# Patient Record
Sex: MALE | Race: WHITE | NOT HISPANIC OR LATINO | Employment: FULL TIME | URBAN - METROPOLITAN AREA
[De-identification: names, ages, dates, MRNs, and addresses within clinical notes are randomized per-mention and may not be internally consistent; named-entity substitution may affect disease eponyms.]

---

## 2021-05-13 ENCOUNTER — HOSPITAL ENCOUNTER (OUTPATIENT)
Facility: MEDICAL CENTER | Age: 66
End: 2021-05-13
Attending: ANESTHESIOLOGY
Payer: COMMERCIAL

## 2021-05-13 LAB
SARS-COV+SARS-COV-2 AG RESP QL IA.RAPID: NOTDETECTED
SPECIMEN SOURCE: NORMAL

## 2021-05-13 PROCEDURE — 87426 SARSCOV CORONAVIRUS AG IA: CPT

## 2023-03-13 PROBLEM — E78.1 HYPERTRIGLYCERIDEMIA: Status: ACTIVE | Noted: 2020-12-03

## 2023-03-13 PROBLEM — R73.01 IMPAIRED FASTING GLUCOSE: Status: ACTIVE | Noted: 2020-12-03

## 2023-03-13 PROBLEM — I10 ESSENTIAL HYPERTENSION: Status: ACTIVE | Noted: 2020-12-03

## 2023-03-13 PROBLEM — F32.A DEPRESSIVE DISORDER: Status: ACTIVE | Noted: 2020-12-03

## 2023-03-13 PROBLEM — R79.83 HOMOCYSTINEMIA: Status: ACTIVE | Noted: 2020-12-03

## 2023-03-13 PROBLEM — I82.409 ACUTE EMBOLISM AND THROMBOSIS OF UNSPECIFIED DEEP VEINS OF UNSPECIFIED LOWER EXTREMITY (HCC): Status: ACTIVE | Noted: 2020-12-03

## 2023-03-13 PROBLEM — M10.9 GOUT: Status: ACTIVE | Noted: 2020-12-03

## 2023-10-17 PROBLEM — K86.89 PANCREATIC MASS: Status: ACTIVE | Noted: 2023-10-17

## 2023-10-17 PROBLEM — C78.02 MALIGNANT NEOPLASM METASTATIC TO BOTH LUNGS (HCC): Status: ACTIVE | Noted: 2023-10-17

## 2023-10-17 PROBLEM — C78.01 MALIGNANT NEOPLASM METASTATIC TO BOTH LUNGS (HCC): Status: ACTIVE | Noted: 2023-10-17

## 2023-10-17 PROBLEM — R97.8 ELEVATED CA 19-9 LEVEL: Status: ACTIVE | Noted: 2023-10-17

## 2023-11-11 ENCOUNTER — HOSPITAL ENCOUNTER (INPATIENT)
Facility: MEDICAL CENTER | Age: 68
LOS: 10 days | DRG: 173 | End: 2023-11-21
Attending: EMERGENCY MEDICINE | Admitting: HOSPITALIST
Payer: COMMERCIAL

## 2023-11-11 ENCOUNTER — APPOINTMENT (OUTPATIENT)
Dept: RADIOLOGY | Facility: MEDICAL CENTER | Age: 68
DRG: 173 | End: 2023-11-11
Attending: HOSPITALIST
Payer: COMMERCIAL

## 2023-11-11 ENCOUNTER — APPOINTMENT (OUTPATIENT)
Dept: RADIOLOGY | Facility: MEDICAL CENTER | Age: 68
DRG: 173 | End: 2023-11-11
Attending: EMERGENCY MEDICINE
Payer: COMMERCIAL

## 2023-11-11 DIAGNOSIS — F41.9 ANXIETY: ICD-10-CM

## 2023-11-11 DIAGNOSIS — C25.9 ADENOCARCINOMA OF PANCREAS, STAGE 4 (HCC): ICD-10-CM

## 2023-11-11 DIAGNOSIS — I82.401 ACUTE DEEP VEIN THROMBOSIS (DVT) OF RIGHT LOWER EXTREMITY, UNSPECIFIED VEIN (HCC): ICD-10-CM

## 2023-11-11 DIAGNOSIS — K83.1 BILIARY OBSTRUCTION: ICD-10-CM

## 2023-11-11 DIAGNOSIS — K83.1 OBSTRUCTIVE JAUNDICE: ICD-10-CM

## 2023-11-11 PROBLEM — R74.8 ELEVATED LIVER ENZYMES: Status: ACTIVE | Noted: 2023-11-11

## 2023-11-11 PROBLEM — N17.9 ACUTE RENAL FAILURE (HCC): Status: ACTIVE | Noted: 2023-11-11

## 2023-11-11 PROBLEM — Z71.89 GOALS OF CARE, COUNSELING/DISCUSSION: Status: ACTIVE | Noted: 2023-11-11

## 2023-11-11 PROBLEM — R57.9 SHOCK (HCC): Status: ACTIVE | Noted: 2023-11-11

## 2023-11-11 LAB
ALBUMIN SERPL BCP-MCNC: 2.8 G/DL (ref 3.2–4.9)
ALBUMIN SERPL BCP-MCNC: 2.9 G/DL (ref 3.2–4.9)
ALBUMIN/GLOB SERPL: 0.8 G/DL
ALBUMIN/GLOB SERPL: 0.9 G/DL
ALP SERPL-CCNC: 768 U/L (ref 30–99)
ALP SERPL-CCNC: 788 U/L (ref 30–99)
ALT SERPL-CCNC: 150 U/L (ref 2–50)
ALT SERPL-CCNC: 151 U/L (ref 2–50)
AMMONIA PLAS-SCNC: 23 UMOL/L (ref 11–45)
ANION GAP SERPL CALC-SCNC: 13 MMOL/L (ref 7–16)
ANION GAP SERPL CALC-SCNC: 13 MMOL/L (ref 7–16)
AST SERPL-CCNC: 110 U/L (ref 12–45)
AST SERPL-CCNC: 127 U/L (ref 12–45)
BASOPHILS # BLD AUTO: 0.3 % (ref 0–1.8)
BASOPHILS # BLD AUTO: 0.4 % (ref 0–1.8)
BASOPHILS # BLD: 0.03 K/UL (ref 0–0.12)
BASOPHILS # BLD: 0.04 K/UL (ref 0–0.12)
BILIRUB SERPL-MCNC: 12.1 MG/DL (ref 0.1–1.5)
BILIRUB SERPL-MCNC: 12.2 MG/DL (ref 0.1–1.5)
BUN SERPL-MCNC: 45 MG/DL (ref 8–22)
BUN SERPL-MCNC: 46 MG/DL (ref 8–22)
CALCIUM ALBUM COR SERPL-MCNC: 9.4 MG/DL (ref 8.5–10.5)
CALCIUM ALBUM COR SERPL-MCNC: 9.6 MG/DL (ref 8.5–10.5)
CALCIUM SERPL-MCNC: 8.5 MG/DL (ref 8.5–10.5)
CALCIUM SERPL-MCNC: 8.6 MG/DL (ref 8.5–10.5)
CHLORIDE SERPL-SCNC: 93 MMOL/L (ref 96–112)
CHLORIDE SERPL-SCNC: 94 MMOL/L (ref 96–112)
CO2 SERPL-SCNC: 18 MMOL/L (ref 20–33)
CO2 SERPL-SCNC: 19 MMOL/L (ref 20–33)
CREAT SERPL-MCNC: 1.11 MG/DL (ref 0.5–1.4)
CREAT SERPL-MCNC: 1.63 MG/DL (ref 0.5–1.4)
EKG IMPRESSION: NORMAL
EKG IMPRESSION: NORMAL
EOSINOPHIL # BLD AUTO: 0.06 K/UL (ref 0–0.51)
EOSINOPHIL # BLD AUTO: 0.07 K/UL (ref 0–0.51)
EOSINOPHIL NFR BLD: 0.6 % (ref 0–6.9)
EOSINOPHIL NFR BLD: 0.7 % (ref 0–6.9)
ERYTHROCYTE [DISTWIDTH] IN BLOOD BY AUTOMATED COUNT: 49.5 FL (ref 35.9–50)
ERYTHROCYTE [DISTWIDTH] IN BLOOD BY AUTOMATED COUNT: 51.1 FL (ref 35.9–50)
FLUAV RNA SPEC QL NAA+PROBE: NEGATIVE
FLUBV RNA SPEC QL NAA+PROBE: NEGATIVE
GFR SERPLBLD CREATININE-BSD FMLA CKD-EPI: 46 ML/MIN/1.73 M 2
GFR SERPLBLD CREATININE-BSD FMLA CKD-EPI: 72 ML/MIN/1.73 M 2
GLOBULIN SER CALC-MCNC: 3.3 G/DL (ref 1.9–3.5)
GLOBULIN SER CALC-MCNC: 3.3 G/DL (ref 1.9–3.5)
GLUCOSE SERPL-MCNC: 119 MG/DL (ref 65–99)
GLUCOSE SERPL-MCNC: 122 MG/DL (ref 65–99)
HCT VFR BLD AUTO: 38.4 % (ref 42–52)
HCT VFR BLD AUTO: 40.3 % (ref 42–52)
HGB BLD-MCNC: 13.3 G/DL (ref 14–18)
HGB BLD-MCNC: 13.8 G/DL (ref 14–18)
IMM GRANULOCYTES # BLD AUTO: 0.1 K/UL (ref 0–0.11)
IMM GRANULOCYTES # BLD AUTO: 0.15 K/UL (ref 0–0.11)
IMM GRANULOCYTES NFR BLD AUTO: 1 % (ref 0–0.9)
IMM GRANULOCYTES NFR BLD AUTO: 1.5 % (ref 0–0.9)
LACTATE SERPL-SCNC: 2.1 MMOL/L (ref 0.5–2)
LIPASE SERPL-CCNC: 24 U/L (ref 11–82)
LIPASE SERPL-CCNC: 26 U/L (ref 11–82)
LYMPHOCYTES # BLD AUTO: 1.14 K/UL (ref 1–4.8)
LYMPHOCYTES # BLD AUTO: 1.25 K/UL (ref 1–4.8)
LYMPHOCYTES NFR BLD: 11.3 % (ref 22–41)
LYMPHOCYTES NFR BLD: 12.6 % (ref 22–41)
MAGNESIUM SERPL-MCNC: 2.4 MG/DL (ref 1.5–2.5)
MCH RBC QN AUTO: 32.2 PG (ref 27–33)
MCH RBC QN AUTO: 32.4 PG (ref 27–33)
MCHC RBC AUTO-ENTMCNC: 34.2 G/DL (ref 32.3–36.5)
MCHC RBC AUTO-ENTMCNC: 34.6 G/DL (ref 32.3–36.5)
MCV RBC AUTO: 93.4 FL (ref 81.4–97.8)
MCV RBC AUTO: 94.2 FL (ref 81.4–97.8)
MONOCYTES # BLD AUTO: 0.84 K/UL (ref 0–0.85)
MONOCYTES # BLD AUTO: 0.92 K/UL (ref 0–0.85)
MONOCYTES NFR BLD AUTO: 8.5 % (ref 0–13.4)
MONOCYTES NFR BLD AUTO: 9.1 % (ref 0–13.4)
NEUTROPHILS # BLD AUTO: 7.56 K/UL (ref 1.82–7.42)
NEUTROPHILS # BLD AUTO: 7.83 K/UL (ref 1.82–7.42)
NEUTROPHILS NFR BLD: 76.4 % (ref 44–72)
NEUTROPHILS NFR BLD: 77.6 % (ref 44–72)
NRBC # BLD AUTO: 0 K/UL
NRBC # BLD AUTO: 0 K/UL
NRBC BLD-RTO: 0 /100 WBC (ref 0–0.2)
NRBC BLD-RTO: 0 /100 WBC (ref 0–0.2)
NT-PROBNP SERPL IA-MCNC: 1358 PG/ML (ref 0–125)
PHOSPHATE SERPL-MCNC: 4.2 MG/DL (ref 2.5–4.5)
PLATELET # BLD AUTO: 126 K/UL (ref 164–446)
PLATELET # BLD AUTO: 129 K/UL (ref 164–446)
PMV BLD AUTO: 9.6 FL (ref 9–12.9)
PMV BLD AUTO: 9.7 FL (ref 9–12.9)
POTASSIUM SERPL-SCNC: 4.9 MMOL/L (ref 3.6–5.5)
POTASSIUM SERPL-SCNC: 5.4 MMOL/L (ref 3.6–5.5)
PROT SERPL-MCNC: 6.1 G/DL (ref 6–8.2)
PROT SERPL-MCNC: 6.2 G/DL (ref 6–8.2)
RBC # BLD AUTO: 4.11 M/UL (ref 4.7–6.1)
RBC # BLD AUTO: 4.28 M/UL (ref 4.7–6.1)
RSV RNA SPEC QL NAA+PROBE: NEGATIVE
SARS-COV-2 RNA RESP QL NAA+PROBE: NOTDETECTED
SODIUM SERPL-SCNC: 124 MMOL/L (ref 135–145)
SODIUM SERPL-SCNC: 126 MMOL/L (ref 135–145)
TROPONIN T SERPL-MCNC: 87 NG/L (ref 6–19)
WBC # BLD AUTO: 10.1 K/UL (ref 4.8–10.8)
WBC # BLD AUTO: 9.9 K/UL (ref 4.8–10.8)

## 2023-11-11 PROCEDURE — 83735 ASSAY OF MAGNESIUM: CPT

## 2023-11-11 PROCEDURE — 82140 ASSAY OF AMMONIA: CPT

## 2023-11-11 PROCEDURE — 83605 ASSAY OF LACTIC ACID: CPT

## 2023-11-11 PROCEDURE — 99223 1ST HOSP IP/OBS HIGH 75: CPT | Mod: AI | Performed by: HOSPITALIST

## 2023-11-11 PROCEDURE — 770022 HCHG ROOM/CARE - ICU (200)

## 2023-11-11 PROCEDURE — 99285 EMERGENCY DEPT VISIT HI MDM: CPT

## 2023-11-11 PROCEDURE — 99291 CRITICAL CARE FIRST HOUR: CPT | Performed by: INTERNAL MEDICINE

## 2023-11-11 PROCEDURE — 85025 COMPLETE CBC W/AUTO DIFF WBC: CPT

## 2023-11-11 PROCEDURE — 0241U HCHG SARS-COV-2 COVID-19 NFCT DS RESP RNA 4 TRGT ED POC: CPT

## 2023-11-11 PROCEDURE — 83880 ASSAY OF NATRIURETIC PEPTIDE: CPT

## 2023-11-11 PROCEDURE — 80053 COMPREHEN METABOLIC PANEL: CPT

## 2023-11-11 PROCEDURE — 93005 ELECTROCARDIOGRAM TRACING: CPT | Performed by: EMERGENCY MEDICINE

## 2023-11-11 PROCEDURE — 700105 HCHG RX REV CODE 258: Performed by: INTERNAL MEDICINE

## 2023-11-11 PROCEDURE — 84484 ASSAY OF TROPONIN QUANT: CPT

## 2023-11-11 PROCEDURE — 700102 HCHG RX REV CODE 250 W/ 637 OVERRIDE(OP): Performed by: HOSPITALIST

## 2023-11-11 PROCEDURE — 83690 ASSAY OF LIPASE: CPT

## 2023-11-11 PROCEDURE — 71045 X-RAY EXAM CHEST 1 VIEW: CPT

## 2023-11-11 PROCEDURE — 84100 ASSAY OF PHOSPHORUS: CPT

## 2023-11-11 PROCEDURE — 700105 HCHG RX REV CODE 258: Performed by: HOSPITALIST

## 2023-11-11 PROCEDURE — 700111 HCHG RX REV CODE 636 W/ 250 OVERRIDE (IP): Mod: JZ | Performed by: INTERNAL MEDICINE

## 2023-11-11 PROCEDURE — 700102 HCHG RX REV CODE 250 W/ 637 OVERRIDE(OP): Performed by: EMERGENCY MEDICINE

## 2023-11-11 PROCEDURE — 700101 HCHG RX REV CODE 250: Performed by: INTERNAL MEDICINE

## 2023-11-11 PROCEDURE — A9270 NON-COVERED ITEM OR SERVICE: HCPCS | Performed by: HOSPITALIST

## 2023-11-11 PROCEDURE — 93005 ELECTROCARDIOGRAM TRACING: CPT | Performed by: HOSPITALIST

## 2023-11-11 PROCEDURE — 700111 HCHG RX REV CODE 636 W/ 250 OVERRIDE (IP): Mod: JZ | Performed by: HOSPITALIST

## 2023-11-11 PROCEDURE — C9803 HOPD COVID-19 SPEC COLLECT: HCPCS

## 2023-11-11 PROCEDURE — A9270 NON-COVERED ITEM OR SERVICE: HCPCS | Performed by: EMERGENCY MEDICINE

## 2023-11-11 PROCEDURE — 36415 COLL VENOUS BLD VENIPUNCTURE: CPT

## 2023-11-11 RX ORDER — ACETAMINOPHEN 325 MG/1
650 TABLET ORAL EVERY 6 HOURS PRN
Status: DISCONTINUED | OUTPATIENT
Start: 2023-11-11 | End: 2023-11-11

## 2023-11-11 RX ORDER — SODIUM CHLORIDE 9 MG/ML
INJECTION, SOLUTION INTRAVENOUS CONTINUOUS
Status: DISCONTINUED | OUTPATIENT
Start: 2023-11-11 | End: 2023-11-11

## 2023-11-11 RX ORDER — SODIUM CHLORIDE, SODIUM LACTATE, POTASSIUM CHLORIDE, AND CALCIUM CHLORIDE .6; .31; .03; .02 G/100ML; G/100ML; G/100ML; G/100ML
1000 INJECTION, SOLUTION INTRAVENOUS ONCE
Status: COMPLETED | OUTPATIENT
Start: 2023-11-12 | End: 2023-11-12

## 2023-11-11 RX ORDER — NOREPINEPHRINE BITARTRATE 0.03 MG/ML
0-1 INJECTION, SOLUTION INTRAVENOUS CONTINUOUS
Status: DISCONTINUED | OUTPATIENT
Start: 2023-11-11 | End: 2023-11-16

## 2023-11-11 RX ORDER — OXYCODONE HYDROCHLORIDE 5 MG/1
5 TABLET ORAL
Status: DISCONTINUED | OUTPATIENT
Start: 2023-11-11 | End: 2023-11-21 | Stop reason: HOSPADM

## 2023-11-11 RX ORDER — BUPROPION HYDROCHLORIDE 150 MG/1
150 TABLET, EXTENDED RELEASE ORAL EVERY MORNING
Status: DISCONTINUED | OUTPATIENT
Start: 2023-11-12 | End: 2023-11-21 | Stop reason: HOSPADM

## 2023-11-11 RX ORDER — SODIUM CHLORIDE, SODIUM LACTATE, POTASSIUM CHLORIDE, AND CALCIUM CHLORIDE .6; .31; .03; .02 G/100ML; G/100ML; G/100ML; G/100ML
30 INJECTION, SOLUTION INTRAVENOUS ONCE
Status: COMPLETED | OUTPATIENT
Start: 2023-11-11 | End: 2023-11-11

## 2023-11-11 RX ORDER — ONDANSETRON 4 MG/1
4 TABLET, ORALLY DISINTEGRATING ORAL EVERY 4 HOURS PRN
Status: DISCONTINUED | OUTPATIENT
Start: 2023-11-11 | End: 2023-11-11

## 2023-11-11 RX ORDER — OXYCODONE HYDROCHLORIDE 10 MG/1
10 TABLET ORAL
Status: DISCONTINUED | OUTPATIENT
Start: 2023-11-11 | End: 2023-11-21 | Stop reason: HOSPADM

## 2023-11-11 RX ORDER — OXYCODONE HCL 10 MG/1
10 TABLET, FILM COATED, EXTENDED RELEASE ORAL EVERY 12 HOURS
Status: ON HOLD | COMMUNITY
End: 2023-11-20

## 2023-11-11 RX ORDER — LABETALOL HYDROCHLORIDE 5 MG/ML
10 INJECTION, SOLUTION INTRAVENOUS EVERY 4 HOURS PRN
Status: DISCONTINUED | OUTPATIENT
Start: 2023-11-11 | End: 2023-11-21 | Stop reason: HOSPADM

## 2023-11-11 RX ORDER — LISINOPRIL 20 MG/1
20 TABLET ORAL DAILY
Status: DISCONTINUED | OUTPATIENT
Start: 2023-11-12 | End: 2023-11-11

## 2023-11-11 RX ORDER — OMEPRAZOLE 20 MG/1
20 CAPSULE, DELAYED RELEASE ORAL DAILY
Status: DISCONTINUED | OUTPATIENT
Start: 2023-11-12 | End: 2023-11-21 | Stop reason: HOSPADM

## 2023-11-11 RX ORDER — FLUOXETINE 10 MG/1
10 CAPSULE ORAL DAILY
Status: DISCONTINUED | OUTPATIENT
Start: 2023-11-12 | End: 2023-11-11

## 2023-11-11 RX ORDER — ONDANSETRON 4 MG/1
4 TABLET, ORALLY DISINTEGRATING ORAL EVERY 4 HOURS PRN
Status: DISCONTINUED | OUTPATIENT
Start: 2023-11-11 | End: 2023-11-21 | Stop reason: HOSPADM

## 2023-11-11 RX ORDER — HYDROXYZINE HYDROCHLORIDE 25 MG/1
25 TABLET, FILM COATED ORAL 3 TIMES DAILY PRN
Status: DISCONTINUED | OUTPATIENT
Start: 2023-11-11 | End: 2023-11-20

## 2023-11-11 RX ORDER — MORPHINE SULFATE 4 MG/ML
4 INJECTION INTRAVENOUS
Status: DISCONTINUED | OUTPATIENT
Start: 2023-11-11 | End: 2023-11-11

## 2023-11-11 RX ORDER — OXYCODONE HYDROCHLORIDE 10 MG/1
10 TABLET ORAL ONCE
Status: COMPLETED | OUTPATIENT
Start: 2023-11-11 | End: 2023-11-11

## 2023-11-11 RX ORDER — ONDANSETRON 2 MG/ML
4 INJECTION INTRAMUSCULAR; INTRAVENOUS EVERY 4 HOURS PRN
Status: DISCONTINUED | OUTPATIENT
Start: 2023-11-11 | End: 2023-11-21 | Stop reason: HOSPADM

## 2023-11-11 RX ORDER — HYDROXYZINE HYDROCHLORIDE 25 MG/1
25 TABLET, FILM COATED ORAL ONCE
Status: DISPENSED | OUTPATIENT
Start: 2023-11-11 | End: 2023-11-12

## 2023-11-11 RX ORDER — SODIUM CHLORIDE, SODIUM LACTATE, POTASSIUM CHLORIDE, CALCIUM CHLORIDE 600; 310; 30; 20 MG/100ML; MG/100ML; MG/100ML; MG/100ML
INJECTION, SOLUTION INTRAVENOUS CONTINUOUS
Status: DISCONTINUED | OUTPATIENT
Start: 2023-11-11 | End: 2023-11-13

## 2023-11-11 RX ADMIN — SODIUM CHLORIDE, POTASSIUM CHLORIDE, SODIUM LACTATE AND CALCIUM CHLORIDE 2967 ML: 600; 310; 30; 20 INJECTION, SOLUTION INTRAVENOUS at 20:45

## 2023-11-11 RX ADMIN — OXYCODONE HYDROCHLORIDE 10 MG: 10 TABLET ORAL at 17:39

## 2023-11-11 RX ADMIN — SODIUM CHLORIDE, POTASSIUM CHLORIDE, SODIUM LACTATE AND CALCIUM CHLORIDE: 600; 310; 30; 20 INJECTION, SOLUTION INTRAVENOUS at 20:10

## 2023-11-11 RX ADMIN — HYDROXYZINE HYDROCHLORIDE 25 MG: 25 TABLET, FILM COATED ORAL at 20:26

## 2023-11-11 RX ADMIN — OXYCODONE HYDROCHLORIDE 10 MG: 10 TABLET ORAL at 21:37

## 2023-11-11 RX ADMIN — SODIUM CHLORIDE, POTASSIUM CHLORIDE, SODIUM LACTATE AND CALCIUM CHLORIDE 1000 ML: 600; 310; 30; 20 INJECTION, SOLUTION INTRAVENOUS at 23:59

## 2023-11-11 RX ADMIN — PIPERACILLIN AND TAZOBACTAM 4.5 G: 4; .5 INJECTION, POWDER, FOR SOLUTION INTRAVENOUS at 21:41

## 2023-11-11 RX ADMIN — NOREPINEPHRINE BITARTRATE 0.1 MCG/KG/MIN: 1 INJECTION, SOLUTION, CONCENTRATE INTRAVENOUS at 23:30

## 2023-11-11 RX ADMIN — FENTANYL CITRATE 50 MCG: 50 INJECTION, SOLUTION INTRAMUSCULAR; INTRAVENOUS at 23:21

## 2023-11-11 ASSESSMENT — ENCOUNTER SYMPTOMS
DIAPHORESIS: 0
FLANK PAIN: 0
PHOTOPHOBIA: 0
FEVER: 0
CHILLS: 0
SINUS PAIN: 0
HEMOPTYSIS: 0
BLOOD IN STOOL: 0
SENSORY CHANGE: 0
VOMITING: 0
TINGLING: 0
NAUSEA: 0
DIZZINESS: 0
MYALGIAS: 0
COUGH: 0
CLAUDICATION: 0
DEPRESSION: 0
FALLS: 0
HEARTBURN: 0
SHORTNESS OF BREATH: 1
BACK PAIN: 0
PND: 0
HEADACHES: 0
SORE THROAT: 0
SPUTUM PRODUCTION: 0
NAUSEA: 1
NECK PAIN: 0
DOUBLE VISION: 0
CONSTIPATION: 0
WHEEZING: 0
WEAKNESS: 1
ORTHOPNEA: 0
BRUISES/BLEEDS EASILY: 0
DIZZINESS: 1
PALPITATIONS: 0
BLURRED VISION: 0
EYE PAIN: 0
DIARRHEA: 0
HALLUCINATIONS: 0
FOCAL WEAKNESS: 0
POLYDIPSIA: 0
STRIDOR: 0
ABDOMINAL PAIN: 1
TREMORS: 0

## 2023-11-11 ASSESSMENT — FIBROSIS 4 INDEX
FIB4 SCORE: 4.73
FIB4 SCORE: 2.5

## 2023-11-11 ASSESSMENT — LIFESTYLE VARIABLES: SUBSTANCE_ABUSE: 0

## 2023-11-11 ASSESSMENT — PAIN DESCRIPTION - PAIN TYPE
TYPE: ACUTE PAIN

## 2023-11-11 NOTE — ED NOTES
Patient to the restroom with a steady gait, oxygen saturation >93% on RA. Patient reported increased weakness and was wheeled back to red 11. ERP notified.

## 2023-11-11 NOTE — ED TRIAGE NOTES
Chief Complaint   Patient presents with    Weakness     BIB EMS from home for generalized weakness x3 months, increasing in severity today. States that ADLs like brushing his teeth make him weak. Pt reports recent diagnosis of Stage 4 pancreatic cancer.     Shortness of Breath     SOB x4 days. Pt denies home O2 use, 94% on RA.       Pt also reports decreased appetite since August. On EMS arrival patient was hypotensive at 88/45, administered 1L NS.

## 2023-11-11 NOTE — ED PROVIDER NOTES
ED Provider Note    CHIEF COMPLAINT  Chief Complaint   Patient presents with    Weakness     BIB EMS from home for generalized weakness x3 months, increasing in severity today. States that ADLs like brushing his teeth make him weak. Pt reports recent diagnosis of Stage 4 pancreatic cancer.     Shortness of Breath     SOB x4 days. Pt denies home O2 use, 94% on RA.        EXTERNAL RECORDS REVIEWED  External records show that the patient is a recent diagnosis of stage IV pancreatic cancer, and had malignant ascites recently documented, consistent with lung metastases.  Patient was in communication with oncology, Dr. Hatch, yesterday.      HPI/ROS  LIMITATION TO HISTORY       OUTSIDE HISTORIAN(S):  Family at bedside contributes to history.    Adam Yan is a 68 y.o. male who presents to the emergency department due to progressive generalized weakness.  He has been deteriorating for about 3 months, per the patient and family, but reportedly has gotten to the point where he needs physical assistance to do any ADLs.  Tasks as simple as brushing his teeth make him feel weak and debilitated.  As of the last few days, he has a new diagnosis of stage IV pancreatic cancer, as above.  He has no history of home oxygen use.  His wife says that at night especially, he has had apparent shortness of breath, with coughing, and she thinks he might need oxygen.  He has 94% oxygen saturation at the bedside.  He is obviously jaundiced.  He is in touch with oncology and with GI, and there is a consideration for biliary stenting within the next couple of weeks, though no stent was placed after a recent ERCP.  He has not started any chemotherapy or radiation.  He does not have a clear prognosis, but family clearly understands that his prognosis is poor based on recent test results.  Patient and family indicate that their preference is to be at home, and they seem very open to discussions about palliative care/hospice, but some  "laboratory testing and other evaluation here may help with decision-making, for the patient and family, and for his GI and oncology.    PAST MEDICAL HISTORY   has a past medical history of Hypertension.    SURGICAL HISTORY   has a past surgical history that includes knee arthroscopy (Right).    FAMILY HISTORY  History reviewed. No pertinent family history.    SOCIAL HISTORY  Social History     Tobacco Use    Smoking status: Never    Smokeless tobacco: Never   Vaping Use    Vaping Use: Never used   Substance and Sexual Activity    Alcohol use: Never    Drug use: No    Sexual activity: Not on file       CURRENT MEDICATIONS  Home Medications       Reviewed by Braulio Diaz (Pharmacy Tech) on 11/11/23 at 1914  Med List Status: Complete     Medication Last Dose Status   buPROPion (WELLBUTRIN XL) 150 MG XL tablet 11/11/2023 Active   hydrOXYzine HCl (ATARAX) 25 MG Tab 11/9/2023 Active   lisinopril (PRINIVIL) 20 MG Tab 11/11/2023 Active   omeprazole (PRILOSEC) 20 MG delayed-release capsule 11/11/2023 Active   ondansetron (ZOFRAN ODT) 4 MG TABLET DISPERSIBLE 11/7/2023 Active   oxyCODONE CR (OXYCONTIN) 10 MG Tablet Extended Release 12 hour Abuse-Deterrent 11/11/2023 Active   oxyCODONE immediate-release (ROXICODONE) 5 MG Tab 11/8/2023 Active                    ALLERGIES  No Known Allergies      PHYSICAL EXAM  VITAL SIGNS: BP 99/64   Pulse 80   Temp 36.7 °C (98.1 °F) (Temporal)   Resp (!) 29   Ht 1.854 m (6' 1\")   Wt 95.7 kg (210 lb 15.7 oz)   SpO2 94%   BMI 27.84 kg/m²   Pulse ox interpretation: I interpret this pulse ox as normal.  Constitutional: Alert in no apparent distress.  HENT: No signs of trauma, Bilateral external ears normal, Nose normal.   Eyes: Conjunctiva icteric.   Neck: Normal range of motion, Supple, No stridor.   Lymphatic: No lymphadenopathy noted.   Cardiovascular: Regular rate and rhythm, no murmurs.   Thorax & Lungs: Normal breath sounds, No respiratory distress, No wheezing  Abdomen: Bowel " sounds normal, Soft, No tenderness, No masses, No pulsatile masses. No peritoneal signs.  Skin: Jaundice extends from head and neck down to lower abdomen.  Back: No midline bony tenderness.   Extremities: Intact distal pulses, No edema, No cyanosis.  Musculoskeletal: Good range of motion in all major joints. No or major deformities noted.   Neurologic: Alert , Normal motor function, Normal sensory function, No focal deficits noted.   Psychiatric: Affect normal, Judgment normal, Mood normal.         DIAGNOSTIC STUDIES / PROCEDURES  EKG  I have independently interpreted this EKG  Results for orders placed or performed during the hospital encounter of 23   EKG   Result Value Ref Range    Report       [facility]    Test Date:  2023  Pt Name:    LAUREN LUGO               Department: ER  MRN:        [id number]                      Room:       R314  Gender:     Male                         Technician: [id number]  :        1955                   Requested By:ER TRIAGE PROTOCOL  Order #:    [id number]                    Reading MD:    Measurements  Intervals                                Axis  Rate:       92                           P:          23  ME:         179                          QRS:        -31  QRSD:       119                          T:          -18  QT:         354  QTc:        438    Interpretive Statements  Sinus rhythm  Incomplete right bundle branch block  Low voltage, precordial leads  Compared to ECG 2023 14:08:27  Incomplete right bundle-branch block now present  ST (T wave) deviation no longer present     EKG   Result Value Ref Range    Report       [facility] Emergency Dept.    Test Date:  2023  Pt Name:    LAUREN LUGO               Department: ER  MRN:        [id number]                      Room:       R314  Gender:     Male                         Technician: [id number]  :        1955                   Requested By:ALFONSO SOOD  Order #:     [id number]                    Reading MD:    Measurements  Intervals                                Axis  Rate:       78                           P:          41  IN:         170                          QRS:        33  QRSD:       113                          T:          -11  QT:         416  QTc:        474    Interpretive Statements  Sinus rhythm  Borderline intraventricular conduction delay  Low voltage, extremity and precordial leads  Minimal ST depression, lateral leads  No previous ECG available for comparison     EKG   Result Value Ref Range    Report       [facility]    Test Date:  2023  Pt Name:    LAUREN LUGO               Department: ER  MRN:        [id number]                      Room:       T621  Gender:     Male                         Technician: [id number]  :        1955                   Requested By:ALMITA FARIAS  Order #:    [id number]                    Reading MD: Tray Chapman MD    Measurements  Intervals                                Axis  Rate:       92                           P:          23  IN:         179                          QRS:        -31  QRSD:       119                          T:          -18  QT:         354  QTc:        438    Interpretive Statements  Sinus rhythm  Incomplete right bundle branch block  Low voltage, precordial leads  Compared to ECG 2023 14:08:27  NO SIGNIFICANT CHANGES  Electronically Signed On 2023 02:46:03 PST by Tray Chapman MD           LABS  Labs Reviewed   CBC WITH DIFFERENTIAL - Abnormal; Notable for the following components:       Result Value    RBC 4.11 (*)     Hemoglobin 13.3 (*)     Hematocrit 38.4 (*)     Platelet Count 126 (*)     Neutrophils-Polys 77.60 (*)     Lymphocytes 11.30 (*)     Immature Granulocytes 1.00 (*)     Neutrophils (Absolute) 7.83 (*)     Monos (Absolute) 0.92 (*)     All other components within normal limits   COMP METABOLIC PANEL - Abnormal; Notable for the following components:     Sodium 126 (*)     Chloride 94 (*)     Co2 19 (*)     Glucose 119 (*)     Bun 45 (*)     Creatinine 1.63 (*)     AST(SGOT) 110 (*)     ALT(SGPT) 150 (*)     Alkaline Phosphatase 788 (*)     Total Bilirubin 12.2 (*)     Albumin 2.8 (*)     All other components within normal limits   COMP METABOLIC PANEL - Abnormal; Notable for the following components:    Sodium 124 (*)     Chloride 93 (*)     Co2 18 (*)     Glucose 122 (*)     Bun 46 (*)     AST(SGOT) 127 (*)     ALT(SGPT) 151 (*)     Alkaline Phosphatase 768 (*)     Total Bilirubin 12.1 (*)     Albumin 2.9 (*)     All other components within normal limits   ESTIMATED GFR - Abnormal; Notable for the following components:    GFR (CKD-EPI) 46 (*)     All other components within normal limits   PROBRAIN NATRIURETIC PEPTIDE, NT - Abnormal; Notable for the following components:    NT-proBNP 1358 (*)     All other components within normal limits   URINALYSIS - Abnormal; Notable for the following components:    Character Cloudy (*)     Bilirubin Large (*)     Nitrite Positive (*)     Leukocyte Esterase Small (*)     All other components within normal limits    Narrative:     Collected By: [id number] MAX STEIN  If not done within the last 24 hours  Release to patient->Immediate   LACTIC ACID - Abnormal; Notable for the following components:    Lactic Acid 2.1 (*)     All other components within normal limits   TROPONIN - Abnormal; Notable for the following components:    Troponin T 87 (*)     All other components within normal limits   CBC WITH DIFFERENTIAL - Abnormal; Notable for the following components:    RBC 4.28 (*)     Hemoglobin 13.8 (*)     Hematocrit 40.3 (*)     RDW 51.1 (*)     Platelet Count 129 (*)     Neutrophils-Polys 76.40 (*)     Lymphocytes 12.60 (*)     Immature Granulocytes 1.50 (*)     Neutrophils (Absolute) 7.56 (*)     Immature Granulocytes (abs) 0.15 (*)     All other components within normal limits   LACTIC ACID - Abnormal; Notable for the  "following components:    Lactic Acid 2.1 (*)     All other components within normal limits   CBC WITH DIFFERENTIAL - Abnormal; Notable for the following components:    RBC 4.03 (*)     Hemoglobin 12.9 (*)     Hematocrit 37.9 (*)     RDW 50.8 (*)     Platelet Count 130 (*)     Neutrophils-Polys 75.30 (*)     Lymphocytes 12.40 (*)     Immature Granulocytes 1.50 (*)     Neutrophils (Absolute) 7.98 (*)     Monos (Absolute) 1.00 (*)     Immature Granulocytes (abs) 0.16 (*)     All other components within normal limits   COMP METABOLIC PANEL - Abnormal; Notable for the following components:    Sodium 127 (*)     Co2 17 (*)     Glucose 144 (*)     Bun 47 (*)     Creatinine 1.49 (*)     Calcium 7.9 (*)     AST(SGOT) 90 (*)     ALT(SGPT) 130 (*)     Alkaline Phosphatase 709 (*)     Total Bilirubin 10.8 (*)     Albumin 2.7 (*)     Total Protein 5.2 (*)     All other components within normal limits   URINE MICROSCOPIC (W/UA) - Abnormal; Notable for the following components:    WBC 10-20 (*)     RBC 10-20 (*)     Hyaline Cast 3-5 (*)     All other components within normal limits    Narrative:     Collected By: [id number] MAX STEIN  If not done within the last 24 hours  Release to patient->Immediate   ESTIMATED GFR - Abnormal; Notable for the following components:    GFR (CKD-EPI) 51 (*)     All other components within normal limits   AMMONIA   LIPASE   LIPASE   ESTIMATED GFR   MAGNESIUM   PHOSPHORUS   LACTIC ACID   BLOOD CULTURE   BLOOD CULTURE   RETICULOCYTES COUNT   LIPID PROFILE   CORTISOL   TSH WITH REFLEX TO FT4   URINALYSIS   BLOOD CULTURE    Narrative:     Per Hospital Policy: Only change Specimen Src: to \"Line\" if  specified by physician order.  Release to patient->Immediate   BLOOD CULTURE    Narrative:     Per Hospital Policy: Only change Specimen Src: to \"Line\" if  specified by physician order.  Release to patient->Immediate   LACTIC ACID   POC COV-2, FLU A/B, RSV BY PCR         RADIOLOGY  I have independently " interpreted the diagnostic imaging associated with this visit and am waiting the final reading from the radiologist.   My preliminary interpretation is as follows: many nodules.    Radiologist interpretation:   CT-ABDOMEN-PELVIS WITH   Final Result         1. 3 cm ill-defined pancreatic mass, which could represent pancreatic adenocarcinoma or cholangiocarcinoma. There is encasement of the adjacent portal, splenic, and mesenteric veins. There is common bile duct dilation above the level of the lesion.   2. There are several peritoneal nodules, largest measuring 4.1 cm. There is also ascites. Therefore, consistent with peritoneal carcinomatosis.   3. Innumerable lower lung zone nodules, which could represent metastatic disease or a benign process.   4. Liver appears cirrhotic.   5. Density within the gallbladder, as above.   6. Simple small right renal cyst, which does not require follow-up.      DX-CHEST-PORTABLE (1 VIEW)   Final Result      Hypoinflation with prominent interstitial and numerous small ill-defined pulmonary nodules bilaterally may indicate granulomatous disease, pneumonitis or neoplastic process.      EC-ECHOCARDIOGRAM COMPLETE W/O CONT    (Results Pending)         COURSE & MEDICAL DECISION MAKING    ED Observation Status? Yes; I am placing the patient in to an observation status due to a diagnostic uncertainty as well as therapeutic intensity. Patient placed in observation status at 2:20 PM, 11/11/2023.     Observation plan is as follows: Labs, chest x-ray, ambulatory pulse ox, consultation with social work and possibly palliative care, reevaluation.    Upon Reevaluation, the patient's condition has: not improved; and will be escalated to hospitalization.    Patient discharged from ED Observation status at 6:55 PM (Time) 11/11/2023 (Date).     INITIAL ASSESSMENT, COURSE AND PLAN  Care Narrative:     2:20 PM patient evaluated at bedside.  He is severely debilitated, with a new diagnosis of stage IV  pancreatic cancer, with an obviously poor prognosis, having significant difficulty with even simple activities of daily living at this point.  Family is open to hospice and palliative care discussions, but have not yet had through opportunity to discuss treatment options with oncology.  Have ordered screening laboratory tests.    2:38 PM I discussed this patient with Alexia Almanza, , to explain to the patient and family are open to hospice and palliative care discussions and that some lab results are pending.  She recommended that I reach out to palliative care team, who may be able to discuss options with the patient at the bedside.  I have sent a Voalte message to to members of the Perative care team who appear to be available on Voalte.    2:40 PM this patient was able to walk with minimal support to the bathroom, about 25 feet from his room, and although his oxygen did not drop, he was too weak to walk back, and required a wheelchair.    5:36 PM Dr. Cabral, gastroenterology, returned my page regarding the patient's jaundice, severe weakness, and 12 fold increase in his bilirubin in the last couple of weeks.  He recommends admission for strong consideration of biliary stenting and his team will consult.    5:36 PM Oxy 10 ordered for gradual onset pain.     5:56 PM Patient agrees to admit for care coordination and probable stent with GI. Bed request submitted.    6:55 PM Dr. MALOU Matias hospitalist, returned my page to discuss the patient, his recent diagnosis, his treatment so far in the emergency department, his ambulatory failure, and my conversation with gastroenterology.  Dr. Matias agrees to admit.    HYDRATION: Based on the patient's presentation of Other generalized weakness and poor p.o. intake the patient was given IV fluids. IV Hydration was used because oral hydration failed due to inadequate appetite. Upon recheck following hydration, the patient was somewhat improved, with subjective  increase in energy.      ADDITIONAL PROBLEM LIST  New diagnosis of stage IV pancreatic cancer, in addition to the patient's chief complaint of shortness of breath and generalized weakness.    DISPOSITION AND DISCUSSIONS  I have discussed management of the patient with the following physicians and ALISE's: Gastroenterology, and the admitting hospitalist, as above.    FINAL DIAGNOSIS  1. Obstructive jaundice    2. Adenocarcinoma of pancreas, stage 4 (HCC)           Electronically signed by: Rudolph Brown M.D., 11/11/2023 2:10 PM

## 2023-11-12 ENCOUNTER — APPOINTMENT (OUTPATIENT)
Dept: RADIOLOGY | Facility: MEDICAL CENTER | Age: 68
DRG: 173 | End: 2023-11-12
Attending: HOSPITALIST
Payer: COMMERCIAL

## 2023-11-12 ENCOUNTER — APPOINTMENT (OUTPATIENT)
Dept: CARDIOLOGY | Facility: MEDICAL CENTER | Age: 68
DRG: 173 | End: 2023-11-12
Attending: HOSPITALIST
Payer: COMMERCIAL

## 2023-11-12 LAB
ALBUMIN SERPL BCP-MCNC: 2.7 G/DL (ref 3.2–4.9)
ALBUMIN/GLOB SERPL: 1.1 G/DL
ALP SERPL-CCNC: 709 U/L (ref 30–99)
ALT SERPL-CCNC: 130 U/L (ref 2–50)
ANION GAP SERPL CALC-SCNC: 14 MMOL/L (ref 7–16)
APPEARANCE UR: ABNORMAL
AST SERPL-CCNC: 90 U/L (ref 12–45)
BACTERIA #/AREA URNS HPF: NEGATIVE /HPF
BASOPHILS # BLD AUTO: 0.7 % (ref 0–1.8)
BASOPHILS # BLD: 0.07 K/UL (ref 0–0.12)
BILIRUB SERPL-MCNC: 10.8 MG/DL (ref 0.1–1.5)
BILIRUB UR QL STRIP.AUTO: ABNORMAL
BUN SERPL-MCNC: 47 MG/DL (ref 8–22)
CALCIUM ALBUM COR SERPL-MCNC: 8.9 MG/DL (ref 8.5–10.5)
CALCIUM SERPL-MCNC: 7.9 MG/DL (ref 8.5–10.5)
CHLORIDE SERPL-SCNC: 96 MMOL/L (ref 96–112)
CO2 SERPL-SCNC: 17 MMOL/L (ref 20–33)
COLOR UR: ABNORMAL
CREAT SERPL-MCNC: 1.49 MG/DL (ref 0.5–1.4)
EKG IMPRESSION: NORMAL
EOSINOPHIL # BLD AUTO: 0.06 K/UL (ref 0–0.51)
EOSINOPHIL NFR BLD: 0.6 % (ref 0–6.9)
EPI CELLS #/AREA URNS HPF: NEGATIVE /HPF
ERYTHROCYTE [DISTWIDTH] IN BLOOD BY AUTOMATED COUNT: 50.8 FL (ref 35.9–50)
GFR SERPLBLD CREATININE-BSD FMLA CKD-EPI: 51 ML/MIN/1.73 M 2
GLOBULIN SER CALC-MCNC: 2.5 G/DL (ref 1.9–3.5)
GLUCOSE SERPL-MCNC: 144 MG/DL (ref 65–99)
GLUCOSE UR STRIP.AUTO-MCNC: NEGATIVE MG/DL
HCT VFR BLD AUTO: 37.9 % (ref 42–52)
HGB BLD-MCNC: 12.9 G/DL (ref 14–18)
HYALINE CASTS #/AREA URNS LPF: ABNORMAL /LPF
IMM GRANULOCYTES # BLD AUTO: 0.16 K/UL (ref 0–0.11)
IMM GRANULOCYTES NFR BLD AUTO: 1.5 % (ref 0–0.9)
KETONES UR STRIP.AUTO-MCNC: NEGATIVE MG/DL
LACTATE SERPL-SCNC: 2.1 MMOL/L (ref 0.5–2)
LEUKOCYTE ESTERASE UR QL STRIP.AUTO: ABNORMAL
LV EJECT FRACT  99904: 70
LV EJECT FRACT MOD 2C 99903: 66.21
LV EJECT FRACT MOD 4C 99902: 64.54
LYMPHOCYTES # BLD AUTO: 1.31 K/UL (ref 1–4.8)
LYMPHOCYTES NFR BLD: 12.4 % (ref 22–41)
MCH RBC QN AUTO: 32 PG (ref 27–33)
MCHC RBC AUTO-ENTMCNC: 34 G/DL (ref 32.3–36.5)
MCV RBC AUTO: 94 FL (ref 81.4–97.8)
MICRO URNS: ABNORMAL
MONOCYTES # BLD AUTO: 1 K/UL (ref 0–0.85)
MONOCYTES NFR BLD AUTO: 9.5 % (ref 0–13.4)
NEUTROPHILS # BLD AUTO: 7.98 K/UL (ref 1.82–7.42)
NEUTROPHILS NFR BLD: 75.3 % (ref 44–72)
NITRITE UR QL STRIP.AUTO: POSITIVE
NRBC # BLD AUTO: 0 K/UL
NRBC BLD-RTO: 0 /100 WBC (ref 0–0.2)
PH UR STRIP.AUTO: 5 [PH] (ref 5–8)
PLATELET # BLD AUTO: 130 K/UL (ref 164–446)
PMV BLD AUTO: 9.7 FL (ref 9–12.9)
POTASSIUM SERPL-SCNC: 4.8 MMOL/L (ref 3.6–5.5)
PROT SERPL-MCNC: 5.2 G/DL (ref 6–8.2)
PROT UR QL STRIP: NEGATIVE MG/DL
RBC # BLD AUTO: 4.03 M/UL (ref 4.7–6.1)
RBC # URNS HPF: ABNORMAL /HPF
RBC UR QL AUTO: NEGATIVE
SODIUM SERPL-SCNC: 127 MMOL/L (ref 135–145)
SP GR UR STRIP.AUTO: 1.02
UROBILINOGEN UR STRIP.AUTO-MCNC: 2 MG/DL
WBC # BLD AUTO: 10.6 K/UL (ref 4.8–10.8)
WBC #/AREA URNS HPF: ABNORMAL /HPF

## 2023-11-12 PROCEDURE — 700111 HCHG RX REV CODE 636 W/ 250 OVERRIDE (IP): Mod: JZ | Performed by: HOSPITALIST

## 2023-11-12 PROCEDURE — 85025 COMPLETE CBC W/AUTO DIFF WBC: CPT

## 2023-11-12 PROCEDURE — 700117 HCHG RX CONTRAST REV CODE 255: Performed by: HOSPITALIST

## 2023-11-12 PROCEDURE — 81001 URINALYSIS AUTO W/SCOPE: CPT

## 2023-11-12 PROCEDURE — 93306 TTE W/DOPPLER COMPLETE: CPT | Mod: 26 | Performed by: INTERNAL MEDICINE

## 2023-11-12 PROCEDURE — 700111 HCHG RX REV CODE 636 W/ 250 OVERRIDE (IP): Mod: JZ | Performed by: INTERNAL MEDICINE

## 2023-11-12 PROCEDURE — 700101 HCHG RX REV CODE 250: Performed by: INTERNAL MEDICINE

## 2023-11-12 PROCEDURE — 87040 BLOOD CULTURE FOR BACTERIA: CPT

## 2023-11-12 PROCEDURE — 770022 HCHG ROOM/CARE - ICU (200)

## 2023-11-12 PROCEDURE — A9270 NON-COVERED ITEM OR SERVICE: HCPCS | Performed by: HOSPITALIST

## 2023-11-12 PROCEDURE — 700111 HCHG RX REV CODE 636 W/ 250 OVERRIDE (IP): Performed by: EMERGENCY MEDICINE

## 2023-11-12 PROCEDURE — 93306 TTE W/DOPPLER COMPLETE: CPT

## 2023-11-12 PROCEDURE — 80053 COMPREHEN METABOLIC PANEL: CPT

## 2023-11-12 PROCEDURE — 700105 HCHG RX REV CODE 258: Performed by: INTERNAL MEDICINE

## 2023-11-12 PROCEDURE — 74177 CT ABD & PELVIS W/CONTRAST: CPT

## 2023-11-12 PROCEDURE — 83605 ASSAY OF LACTIC ACID: CPT

## 2023-11-12 PROCEDURE — 700102 HCHG RX REV CODE 250 W/ 637 OVERRIDE(OP): Performed by: EMERGENCY MEDICINE

## 2023-11-12 PROCEDURE — 93010 ELECTROCARDIOGRAM REPORT: CPT | Performed by: INTERNAL MEDICINE

## 2023-11-12 PROCEDURE — 700105 HCHG RX REV CODE 258: Performed by: HOSPITALIST

## 2023-11-12 PROCEDURE — 51798 US URINE CAPACITY MEASURE: CPT

## 2023-11-12 PROCEDURE — 99232 SBSQ HOSP IP/OBS MODERATE 35: CPT | Performed by: SPECIALIST

## 2023-11-12 PROCEDURE — 700102 HCHG RX REV CODE 250 W/ 637 OVERRIDE(OP): Performed by: HOSPITALIST

## 2023-11-12 PROCEDURE — A9270 NON-COVERED ITEM OR SERVICE: HCPCS | Performed by: EMERGENCY MEDICINE

## 2023-11-12 PROCEDURE — 99291 CRITICAL CARE FIRST HOUR: CPT | Performed by: EMERGENCY MEDICINE

## 2023-11-12 RX ORDER — SODIUM CHLORIDE, SODIUM LACTATE, POTASSIUM CHLORIDE, AND CALCIUM CHLORIDE .6; .31; .03; .02 G/100ML; G/100ML; G/100ML; G/100ML
1000 INJECTION, SOLUTION INTRAVENOUS ONCE
Status: COMPLETED | OUTPATIENT
Start: 2023-11-12 | End: 2023-11-12

## 2023-11-12 RX ORDER — OXYCODONE HCL 10 MG/1
10 TABLET, FILM COATED, EXTENDED RELEASE ORAL EVERY 12 HOURS
Status: DISCONTINUED | OUTPATIENT
Start: 2023-11-12 | End: 2023-11-20

## 2023-11-12 RX ORDER — HYDROMORPHONE HYDROCHLORIDE 1 MG/ML
0.25 INJECTION, SOLUTION INTRAMUSCULAR; INTRAVENOUS; SUBCUTANEOUS
Status: DISCONTINUED | OUTPATIENT
Start: 2023-11-12 | End: 2023-11-21 | Stop reason: HOSPADM

## 2023-11-12 RX ADMIN — BUPROPION HYDROCHLORIDE 150 MG: 150 TABLET, EXTENDED RELEASE ORAL at 10:07

## 2023-11-12 RX ADMIN — FENTANYL CITRATE 50 MCG: 50 INJECTION, SOLUTION INTRAMUSCULAR; INTRAVENOUS at 05:44

## 2023-11-12 RX ADMIN — OXYCODONE HYDROCHLORIDE 10 MG: 10 TABLET, FILM COATED, EXTENDED RELEASE ORAL at 17:52

## 2023-11-12 RX ADMIN — PIPERACILLIN AND TAZOBACTAM 4.5 G: 4; .5 INJECTION, POWDER, FOR SOLUTION INTRAVENOUS at 12:57

## 2023-11-12 RX ADMIN — PIPERACILLIN AND TAZOBACTAM 4.5 G: 4; .5 INJECTION, POWDER, FOR SOLUTION INTRAVENOUS at 20:52

## 2023-11-12 RX ADMIN — SODIUM CHLORIDE, POTASSIUM CHLORIDE, SODIUM LACTATE AND CALCIUM CHLORIDE: 600; 310; 30; 20 INJECTION, SOLUTION INTRAVENOUS at 10:01

## 2023-11-12 RX ADMIN — HYDROMORPHONE HYDROCHLORIDE 0.25 MG: 1 INJECTION, SOLUTION INTRAMUSCULAR; INTRAVENOUS; SUBCUTANEOUS at 13:03

## 2023-11-12 RX ADMIN — OMEPRAZOLE 20 MG: 20 CAPSULE, DELAYED RELEASE ORAL at 05:45

## 2023-11-12 RX ADMIN — PIPERACILLIN AND TAZOBACTAM 4.5 G: 4; .5 INJECTION, POWDER, FOR SOLUTION INTRAVENOUS at 06:10

## 2023-11-12 RX ADMIN — OXYCODONE 5 MG: 5 TABLET ORAL at 14:32

## 2023-11-12 RX ADMIN — OXYCODONE HYDROCHLORIDE 10 MG: 10 TABLET, FILM COATED, EXTENDED RELEASE ORAL at 10:07

## 2023-11-12 RX ADMIN — PIPERACILLIN AND TAZOBACTAM 4.5 G: 4; .5 INJECTION, POWDER, FOR SOLUTION INTRAVENOUS at 00:21

## 2023-11-12 RX ADMIN — NOREPINEPHRINE BITARTRATE 0.07 MCG/KG/MIN: 1 INJECTION, SOLUTION, CONCENTRATE INTRAVENOUS at 14:46

## 2023-11-12 RX ADMIN — SODIUM CHLORIDE, POTASSIUM CHLORIDE, SODIUM LACTATE AND CALCIUM CHLORIDE 1000 ML: 600; 310; 30; 20 INJECTION, SOLUTION INTRAVENOUS at 01:05

## 2023-11-12 RX ADMIN — OXYCODONE 5 MG: 5 TABLET ORAL at 20:50

## 2023-11-12 RX ADMIN — HUMAN ALBUMIN MICROSPHERES AND PERFLUTREN 3 ML: 10; .22 INJECTION, SOLUTION INTRAVENOUS at 14:45

## 2023-11-12 RX ADMIN — IOHEXOL 95 ML: 350 INJECTION, SOLUTION INTRAVENOUS at 03:45

## 2023-11-12 ASSESSMENT — PAIN DESCRIPTION - PAIN TYPE
TYPE: ACUTE PAIN

## 2023-11-12 ASSESSMENT — ENCOUNTER SYMPTOMS: ABDOMINAL PAIN: 1

## 2023-11-12 NOTE — ED NOTES
Pt being taken upstairs at this time by transport via gurney. Pt is awake and alert, talking to staff, on oxygen and breathing with even chest rise and fall. Pt's paperwork and belongings sent upstairs with pt and transport.

## 2023-11-12 NOTE — PROGRESS NOTES
Pt scheduled for CT at 0300. Asked MD Sibley if IV antibiotic can be paused during CT trip. Okay to pause antibiotic while pt is in CT per MD Sibley.

## 2023-11-12 NOTE — ASSESSMENT & PLAN NOTE
Stage IV adenocarcinoma of the pancreas with evidence of pulmonary metastases and peritoneal carcinomatosis  Appreciate Dr. Frey's consult

## 2023-11-12 NOTE — ASSESSMENT & PLAN NOTE
Obstructive shock with right ventricular failure due to acute pulmonary embolism  Shock has resolved  He is off vasopressor support  Continue midodrine, 5 mg every 8 hours

## 2023-11-12 NOTE — PROGRESS NOTES
Critical Care Progress Note    Date of admission  11/11/2023    Chief Complaint  68 y.o. male with a pmhx of HTN, GERD, gout who presented 11/11/2023 with jaundice, poor oral intake and malaise.  The patient and his wife report approximately 1 month diagnosis of a pancreatic mass and lung metastasis.  He underwent a EGD on 10/31/2023 by Dr. Heredia at [place] for biopsy of this mass and unfortunately the pathology has returned 3 days ago with adenocarcinoma.  Given the pulmonary metastasis this would stage him to stage IV pancreatic adenocarcinoma, they have a follow-up appointment with his oncologist Dr. Hatch at [facility] next week.  Over the past week the patient's wife states he has been progressively weaker mostly staying in bed with the exception of going to the bathroom.  He has had incredibly poor oral intake and they have been trying bone broths and boost to increase his caloric intake.  He presented to the ER where he was found to have a fairly unremarkable CBC, hyponatremia at 124, ADAM with a creatinine of 1.63, BUN 45, AST and ALT elevations at 110 and 150 respectively and an alkaline phosphatase of 788.  In addition to this his total bilirubin was noted at 12.2.  Other than his weakness, malaise and inability to eat he has noted some acid epigastric pain which does radiate to his back.  He denies any bowel or bladder changes, he has had some increasing dyspnea however no chest pain.  Given the abruptness of this symptoms onset and the acuity of his diagnosis he has not had the opportunity to discuss treatment plans or goals of care with his oncology team.  H+P    Hospital Course  11/12-Admit ICU shock (hypovelemic vs septic/distributive), hyperbili from mechanical obstruction d/t panc mass.  Titrating NE, Zosyn, GI to stent tomorrow AM, DNR/DNI    Interval Problem Update  Reviewed last 24 hour events:  Admitted ICU overnight from floor for hypotension and shock  5L IVFs total, NE intiated      CV:  HR  70s-80s  -130s  NE @ 0.09    Resp:   2L SP02 96%  CXR multiple nodular densities throughout B lungs, chronic    GI: last BM PTA    I/O: +4L  UOP: 250    Tmax: Hypothermic, 35-36    Heme: WBC 9.9  Abx:   (11/11- )  P/Tazo    Micro:   NGTD    Endo: BG WTR    LDA: PIVs  SUP: PPI (home)  VTE: held pending potential intervention today  Diet: NPO for the time being       Review of Systems  Review of Systems   Gastrointestinal:  Positive for abdominal pain.   All other systems reviewed and are negative.       Vital Signs for last 24 hours   Temp:  [35.7 °C (96.2 °F)-37 °C (98.6 °F)] 36.7 °C (98.1 °F)  Pulse:  [70-96] 78  Resp:  [17-39] 25  BP: ()/(48-73) 107/59  SpO2:  [91 %-100 %] 94 %    Hemodynamic parameters for last 24 hours       Respiratory Information for the last 24 hours       Physical Exam   Physical Exam  Constitutional:       General: He is not in acute distress.     Appearance: He is ill-appearing.   HENT:      Head: Normocephalic.      Nose: No congestion.      Mouth/Throat:      Mouth: Mucous membranes are dry.   Eyes:      General: Scleral icterus present.      Extraocular Movements: Extraocular movements intact.      Pupils: Pupils are equal, round, and reactive to light.   Cardiovascular:      Rate and Rhythm: Regular rhythm. Tachycardia present.      Heart sounds: No murmur heard.  Pulmonary:      Effort: Pulmonary effort is normal.      Breath sounds: No rales.   Abdominal:      Comments: Epig ttp, non rigid   Musculoskeletal:      Right lower leg: No edema.      Left lower leg: No edema.   Skin:     General: Skin is warm.      Capillary Refill: Capillary refill takes less than 2 seconds.      Comments: Jaundiced   Neurological:      General: No focal deficit present.      Mental Status: He is oriented to person, place, and time. Mental status is at baseline.         Medications  Current Facility-Administered Medications   Medication Dose Route Frequency Provider Last Rate Last Admin     oxyCODONE CR (OxyCONTIN) tablet 10 mg  10 mg Oral Q12HRS Real Green M.D.   10 mg at 11/12/23 1007    HYDROmorphone (Dilaudid) injection 0.25 mg  0.25 mg Intravenous Q HOUR PRN Real Green M.D.        hydrOXYzine HCl (Atarax) tablet 25 mg  25 mg Oral Once Rudolph Brown M.D.        omeprazole (PriLOSEC) capsule 20 mg  20 mg Oral DAILY Vinnie Matias M.D.   20 mg at 11/12/23 0545    buPROPion SR (Wellbutrin-SR) tablet 150 mg  150 mg Oral QAM Vinnie Matias M.D.   150 mg at 11/12/23 1007    oxyCODONE immediate-release (Roxicodone) tablet 5 mg  5 mg Oral Q3HRS PRN Vinnie Matias M.D.        Or    oxyCODONE immediate release (Roxicodone) tablet 10 mg  10 mg Oral Q3HRS PRN Vinnie Matias M.D.   10 mg at 11/11/23 2137    labetalol (Normodyne/Trandate) injection 10 mg  10 mg Intravenous Q4HRS PRN Vinnie Matias M.D.        ondansetron (Zofran) syringe/vial injection 4 mg  4 mg Intravenous Q4HRS PRN Vinnie Matias M.D.        ondansetron (Zofran ODT) dispertab 4 mg  4 mg Oral Q4HRS PRN Vinnie Matias M.D.        lactated ringers infusion   Intravenous Continuous Vinnie Matias M.D. 100 mL/hr at 11/12/23 1001 New Bag at 11/12/23 1001    hydrOXYzine HCl (Atarax) tablet 25 mg  25 mg Oral TID PRN Vinnie Matias M.D.   25 mg at 11/11/23 2026    piperacillin-tazobactam (Zosyn) 4.5 g in  mL IVPB  4.5 g Intravenous Q8HRS Vinnie Matias M.D.   Stopped at 11/12/23 1010    norepinephrine (Levophed) 8 mg in 250 mL NS infusion (premix)  0-1 mcg/kg/min (Ideal) Intravenous Continuous Cristino Sosa Jr., D.O. 13.5 mL/hr at 11/12/23 0843 0.09 mcg/kg/min at 11/12/23 0843       Fluids    Intake/Output Summary (Last 24 hours) at 11/12/2023 1038  Last data filed at 11/12/2023 0843  Gross per 24 hour   Intake 4833.76 ml   Output 800 ml   Net 4033.76 ml       Laboratory          Recent Labs     11/11/23  1409 11/11/23  1606 11/12/23  0600   SODIUM 124* 126* 127*   POTASSIUM 5.4 4.9 4.8   CHLORIDE 93* 94* 96   CO2 18* 19* 17*   BUN  46* 45* 47*   CREATININE 1.11 1.63* 1.49*   MAGNESIUM  --  2.4  --    PHOSPHORUS  --  4.2  --    CALCIUM 8.5 8.6 7.9*     Recent Labs     11/11/23  1409 11/11/23  1606 11/12/23  0600   ALTSGPT 151* 150* 130*   ASTSGOT 127* 110* 90*   ALKPHOSPHAT 768* 788* 709*   TBILIRUBIN 12.1* 12.2* 10.8*   LIPASE 26 24  --    GLUCOSE 122* 119* 144*     Recent Labs     11/11/23  1409 11/11/23  1606 11/11/23 2038 11/12/23  0600   WBC 10.1  --  9.9 10.6   NEUTSPOLYS 77.60*  --  76.40* 75.30*   LYMPHOCYTES 11.30*  --  12.60* 12.40*   MONOCYTES 9.10  --  8.50 9.50   EOSINOPHILS 0.70  --  0.60 0.60   BASOPHILS 0.30  --  0.40 0.70   ASTSGOT 127* 110*  --  90*   ALTSGPT 151* 150*  --  130*   ALKPHOSPHAT 768* 788*  --  709*   TBILIRUBIN 12.1* 12.2*  --  10.8*     Recent Labs     11/11/23  1409 11/11/23 2038 11/12/23  0600   RBC 4.11* 4.28* 4.03*   HEMOGLOBIN 13.3* 13.8* 12.9*   HEMATOCRIT 38.4* 40.3* 37.9*   PLATELETCT 126* 129* 130*       Imaging  CT:    Reviewed    Assessment/Plan  * Biliary obstruction- (present on admission)  Assessment & Plan  Elevated total bilirubin in the setting of a gentleman with pancreatic adenocarcinoma with a mass of the pancreatic head likely representing biliary obstruction due to mass effect from his cancer.    11/12: CT A/P with 3cm pancreatic mass encasing portal, splenic and mesenteric veins with CBD dilation above this lesion indicative of mechanical compression and obstruction  Gi consult: Sonia, plan for biliary stent tomorrow AM, NPO at MN    Elevated transaminases and Tbili c/w hepatocellular injury, obstructive pattern with markedly elvated AP and possible cholangitis    Empiric antibiotics in the meantime   GI to see this AM for recs regarding decompression    Goals of care, counseling/discussion  Assessment & Plan  DNR/DNI, ok with supportive care and general anesthesia if needed, see ACP note 11/12    Shock (HCC)- (present on admission)  Assessment & Plan  Most likely etiology is hypovolemia  given clinical scenario, additional consideration to cholangitis iso of hyperbili    TTE pending  Titrating vasopressors to achieve MAP targets >65  TSH and cortisol pending  Ongoing volume resuscitation      Elevated liver enzymes- (present on admission)  Assessment & Plan  Likely due to pancreatic adenocarcinoma and biliary obstruction  Trend AST and ALT  Avoid hepatotoxins  Monitor synthetic function    Acute renal failure (HCC)- (present on admission)  Assessment & Plan  Labs consistent with prerenal etiology however hypotension may be contributing to a component of ATN    IVF  Renal dose meds, avoid nephrotoxins  Strict I/Os  Follow renal function    Pancreatic adenocarcinoma (HCC)- (present on admission)  Assessment & Plan  Likely stage IV pancreatic adenocarcinoma with metastasis to the lungs and spine    Lytic lesions noted in spine and multiple nodular pulmonary infiltrates likely associated with diffuse widely metastatic disease    5-year prognosis discussed with the patient and his wife  Has not been scheduled for neoadjuvant chemotherapy or surgery yet, scheduled to see outpatient oncologist this week  Now with biliary obstruction and progressive worsening of his functional status  Will obtain oncology input once patient more clinically stable            Acute embolism and thrombosis of unspecified deep veins of unspecified lower extremity (HCC)- (present on admission)  Assessment & Plan  History of DVT in 2014 to RLE, used to drive a lot but otherwise unprovoked  Also had reported history of a PE  Was on rivaroxaban x 6 months    No recurrence    No CP or leg swelling  Holding VTE ppx for now but should start as soon as feasible depending on timing of intervention and GI preferences         VTE:  Contraindicated  Ulcer: PPI  Lines: None    I have performed a physical exam and reviewed and updated ROS and Plan today (11/12/2023). In review of yesterday's note (11/11/2023), there are no changes except as  documented above.     Discussed patient condition and risk of morbidity and/or mortality with Family, RN, RT, Therapies, Pharmacy, Patient, and GI  The patient remains critically ill.  Critical care time = 60 minutes in directly providing and coordinating critical care and extensive data review.  No time overlap and excludes procedures.

## 2023-11-12 NOTE — ASSESSMENT & PLAN NOTE
Likely due to pancreatic adenocarcinoma and biliary obstruction  Trend AST and ALT  Avoid hepatotoxins  Monitor synthetic function

## 2023-11-12 NOTE — PROGRESS NOTES
4 Eyes Skin Assessment Completed by Trina, RN and Maxwell RN.    Head WDL  Ears WDL  Nose WDL  Mouth WDL  Neck WDL  Breast/Chest WDL  Shoulder Blades WDL  Spine WDL  (R) Arm/Elbow/Hand Bruising  (L) Arm/Elbow/Hand Bruising  Abdomen WDL  Groin WDL  Scrotum/Coccyx/Buttocks WDL  (R) Leg WDL  (L) Leg WDL  (R) Heel/Foot/Toe WDL  (L) Heel/Foot/Toe WDL          Devices In Places ECG, Blood Pressure Cuff, Pulse Ox, SCD's, and Nasal Cannula      Interventions In Place NC W/Ear Foams, Pillows, and Low Air Loss Mattress    Possible Skin Injury No    Pictures Uploaded Into Epic N/A  Wound Consult Placed N/A  RN Wound Prevention Protocol Ordered No

## 2023-11-12 NOTE — H&P
Hospital Medicine History & Physical Note    Date of Service  11/16/2023    Primary Care Physician  Fuentes Curran M.D.    Consultants  Consult GI in a.m.    Specialist Names:     Code Status  DNAR/DNI    Chief Complaint  Chief Complaint   Patient presents with    Weakness     BIB EMS from home for generalized weakness x3 months, increasing in severity today. States that ADLs like brushing his teeth make him weak. Pt reports recent diagnosis of Stage 4 pancreatic cancer.     Shortness of Breath     SOB x4 days. Pt denies home O2 use, 94% on RA.        History of Presenting Illness  Adam Yan is a 68 y.o. male who presented 11/11/2023 with past medical history of pancreatic adenocarcinoma stage IV, hypertension who presents to the hospital for generalized weakness, fatigue and jaundice for 1 week prior to admit.  He also complains of shortness of breath on exertion.  Patient had was diagnosed with pancreatic carcinoma on 10/31 after an ERCP.  During admit he a CTA chest showing bilateral pulmonary emboli, CBD dilation, pancreatic head mass, and a doppler lower extremity with DVT.  An Echo showed acute right heart strain. He required admit to the ICU for for shock (hypovolemic and septic/distributive) and was on pressors.  He was jaundiced and labs suggest mechanical obstruction. On 11/13 he had EKOS with improvement.  GI was consulted for consideration of biliary stent.  Oncology consulted and due to hyperbilirubinemia he is not a current chemo candidate.      11/16:  He is off norepinephrine.  Transferred to oncology.  Arcenio reviewed his notes.  I reviewed imaging with he and his wife showing them his lungs with miliary tumors, and pancreatic head mass.  Discussion over ERCP with pros/cons discussed.  He and his wife were made aware that whatever decision is made that he should have hospice arranged.  He has been too weak to walk and may need SNF in [place] prior to return home.     Time spend at bedside  with patient and his wife 45 minutes face to face in discussion of care and options towards the end of his life.    I discussed the plan of care with patient.    Review of Systems  Review of Systems   Constitutional:  Negative for chills, diaphoresis, fever and malaise/fatigue.   HENT:  Negative for congestion, ear discharge, ear pain, hearing loss, nosebleeds, sinus pain, sore throat and tinnitus.    Eyes:  Negative for blurred vision, double vision, photophobia and pain.   Respiratory:  Positive for shortness of breath. Negative for cough, hemoptysis, sputum production, wheezing and stridor.    Cardiovascular:  Negative for chest pain, palpitations, orthopnea, claudication, leg swelling and PND.   Gastrointestinal:  Positive for abdominal pain and nausea. Negative for blood in stool, constipation, diarrhea, heartburn, melena and vomiting.   Genitourinary:  Negative for dysuria, flank pain, frequency, hematuria and urgency.   Musculoskeletal:  Negative for back pain, falls, joint pain, myalgias and neck pain.   Skin:  Negative for itching and rash.   Neurological:  Positive for dizziness and weakness. Negative for tingling, tremors and headaches.   Endo/Heme/Allergies:  Negative for environmental allergies and polydipsia. Does not bruise/bleed easily.   Psychiatric/Behavioral:  Negative for depression, hallucinations, substance abuse and suicidal ideas.        Past Medical History   has a past medical history of Hypertension.    Surgical History   has a past surgical history that includes knee arthroscopy (Right).     Family History  Family history reviewed with patient. There is no family history that is pertinent to the chief complaint.     Social History   reports that he has never smoked. He has never used smokeless tobacco. He reports that he does not drink alcohol and does not use drugs.    Allergies  No Known Allergies    Medications  Prior to Admission Medications   Prescriptions Last Dose Informant Patient  Reported? Taking?   FLUoxetine (PROZAC) 10 MG Cap   No No   Sig: Take 1 Capsule by mouth every day.   buPROPion (WELLBUTRIN XL) 150 MG XL tablet   No No   Sig: Take 1 Tablet by mouth every morning.   hydrOXYzine HCl (ATARAX) 25 MG Tab   No No   Sig: Take 1 Tablet by mouth 3 times a day as needed for Anxiety for up to 30 days.   lisinopril (PRINIVIL) 20 MG Tab   No No   Sig: take 1 tablet by mouth once daily   omeprazole (PRILOSEC) 20 MG delayed-release capsule   No No   Sig: Take 1 Capsule by mouth every day.   ondansetron (ZOFRAN ODT) 4 MG TABLET DISPERSIBLE   No No   Sig: Take 1 Tablet by mouth every four hours as needed for Nausea/Vomiting.   oxyCODONE immediate-release (ROXICODONE) 5 MG Tab   No No   Sig: Take 1 Tablet by mouth every 6 hours as needed for Severe Pain for up to 30 days.      Facility-Administered Medications: None       Physical Exam  Temp:  [35.9 °C (96.6 °F)-36.5 °C (97.7 °F)] 36.5 °C (97.7 °F)  Pulse:  [69-81] 75  Resp:  [18-22] 18  BP: ()/(56-74) 103/65  SpO2:  [89 %-95 %] 91 %  Blood Pressure : 95/50   Temperature: 37 °C (98.6 °F)   Pulse: 83   Respiration: 18   Pulse Oximetry: 93 %       Physical Exam  Vitals and nursing note reviewed.   Constitutional:       General: He is not in acute distress.     Appearance: Normal appearance. He is not ill-appearing, toxic-appearing or diaphoretic.   HENT:      Head: Normocephalic and atraumatic.      Nose: No congestion or rhinorrhea.      Mouth/Throat:      Pharynx: No posterior oropharyngeal erythema.   Eyes:      General: No scleral icterus.        Right eye: No discharge.   Cardiovascular:      Rate and Rhythm: Normal rate and regular rhythm.      Pulses: Normal pulses.      Heart sounds: Normal heart sounds. No murmur heard.     No friction rub. No gallop.   Pulmonary:      Effort: Pulmonary effort is normal. No respiratory distress.      Breath sounds: No stridor. Rales present. No wheezing or rhonchi.   Abdominal:      General: There is  "no distension.      Tenderness: There is no abdominal tenderness.   Musculoskeletal:         General: No swelling, tenderness, deformity or signs of injury.      Cervical back: Normal range of motion.      Right lower leg: No edema.      Left lower leg: No edema.   Skin:     Capillary Refill: Capillary refill takes more than 3 seconds.      Coloration: Skin is not jaundiced or pale.      Findings: No bruising, erythema, lesion or rash.   Neurological:      General: No focal deficit present.      Mental Status: He is alert and oriented to person, place, and time.         Laboratory:  Recent Labs     11/14/23  0330 11/15/23  0345 11/16/23  0447   WBC 11.9* 9.1 8.0   RBC 3.90* 3.56* 3.55*   HEMOGLOBIN 12.7* 11.4* 11.3*   HEMATOCRIT 36.7* 33.2* 34.0*   MCV 94.1 93.3 95.8   MCH 32.6 32.0 31.8   MCHC 34.6 34.3 33.2   RDW 53.8* 53.0* 56.7*   PLATELETCT 74* 134* 130*   MPV 11.3 9.8 10.1     Recent Labs     11/14/23  0610 11/15/23  0345 11/16/23  0447   SODIUM 130* 130* 131*   POTASSIUM 4.7 4.3 4.1   CHLORIDE 100 101 102   CO2 16* 19* 19*   GLUCOSE 135* 119* 102*   BUN 43* 38* 35*   CREATININE 1.30 1.16 1.01   CALCIUM 7.8* 7.8* 7.7*     Recent Labs     11/14/23  0610 11/15/23  0345 11/16/23  0447   ALTSGPT 131* 152* 183*   ASTSGOT 108* 148* 183*   ALKPHOSPHAT 668* 664* 710*   TBILIRUBIN 7.8* 7.6* 7.7*   GLUCOSE 135* 119* 102*         No results for input(s): \"NTPROBNP\" in the last 72 hours.      No results for input(s): \"TROPONINT\" in the last 72 hours.    Imaging:  IR-PULMONARY ARTERY-R-L SELECTIVE RIGHT   Final Result      1.  Pulmonary hypertension as recorded above.   2.  BILATERAL selective pulmonary arteriograms most notable for large distal right main pulmonary artery clot filling defect concordant with CTA findings.   3.  Placement of BILATERAL EKOS catheters   4.  Initiation of bilateral thrombolytic infusion with EKOS acoustic pulse thrombolysis therapy (US-assisted thrombolysis) for duration 4 hours    "   US-EXTREMITY VENOUS LOWER BILAT   Final Result      CT-CTA CHEST PULMONARY ARTERY W/ RECONS   Final Result      1.  Extensive bilateral pulmonary emboli involving the main pulmonary arteries, segmental, and subsegmental branches. RV strain noted.      2.  Diffuse metastatic disease throughout the lung fields.      3.  Confluent masslike infiltrates in the periphery of the lung fields bilaterally consistent with metastatic disease and/or pneumonitis.      4.  Mild to moderate bilateral pleural effusions.            EC-ECHOCARDIOGRAM COMPLETE W/ CONT   Final Result      CT-ABDOMEN-PELVIS WITH   Final Result         1. 3 cm ill-defined pancreatic mass, which could represent pancreatic adenocarcinoma or cholangiocarcinoma. There is encasement of the adjacent portal, splenic, and mesenteric veins. There is common bile duct dilation above the level of the lesion.   2. There are several peritoneal nodules, largest measuring 4.1 cm. There is also ascites. Therefore, consistent with peritoneal carcinomatosis.   3. Innumerable lower lung zone nodules, which could represent metastatic disease or a benign process.   4. Liver appears cirrhotic.   5. Density within the gallbladder, as above.   6. Simple small right renal cyst, which does not require follow-up.      DX-CHEST-PORTABLE (1 VIEW)   Final Result      Hypoinflation with prominent interstitial and numerous small ill-defined pulmonary nodules bilaterally may indicate granulomatous disease, pneumonitis or neoplastic process.          X-Ray:  I have personally reviewed the images and compared with prior images.  EKG:  I have personally reviewed the images and compared with prior images.    Assessment/Plan:  Justification for Admission Status  I anticipate this patient will require at least two midnights for appropriate medical management, necessitating inpatient admission because shortness of breath    Patient will need a Med/Surg bed on ONCOLOGY service .  The need is  secondary to shortness of breath.    * Shock (HCC)- (present on admission)  Assessment & Plan  Norepinephrine stopped and off 11/16.  Monitor vitals.  Oral intake as tolerates    Thrombocytopenia (HCC)  Assessment & Plan  Due to metastatic adenocarcinoma  11/11 Plt:130 <134<74 <126    Hyponatremia- (present on admission)  Assessment & Plan  Hypovolemia hyponatremia  IV fluids  Monitor labs as needed  11/16 Na:131    Acute pulmonary embolism with acute cor pulmonale (HCC)  Assessment & Plan  S/P EKOS 11/13  Heparin drip until finalized plan for ERCP or not.  Then possible DOAC or lovenox.    Right ventricular failure (HCC)  Assessment & Plan  Due to pulmonary embolism.  Echo reviewed.  S/P Ekos 11/13    Goals of care, counseling/discussion  Assessment & Plan  DNR/DNI  Hospice encouraged upon discharge    Acute kidney injury (HCC)- (present on admission)  Assessment & Plan  Likely prerenal  IV fluid hydration   Monitor BMP and assess response  Avoid IV contrast/nephrotoxins/NSAIDs  Dose adjust meds for decreased GFR  Last labs from 11/11 Cr:1.63      Pancreatic adenocarcinoma (HCC)- (present on admission)  Assessment & Plan  Diagnosed on 10/31  Metastatic to the lungs  Pain control  The patient has not started chemotherapy  Palliative consult  Follows Dr. Hatch as oncology in [place]  Dr Frey's note reviewed and no current options given his hyperbilirubinemia    Biliary obstruction- (present on admission)  Assessment & Plan  Secondary to an pancreatic mass  GI consulting for possible ERCP for stent (heparin will need to be held 4 hours prior to procedure)  Pain control  No evidence of fevers or signs of cholangitis    Primary hypertension- (present on admission)  Assessment & Plan  Monitor vitals.  Was on Levophed up until 11/16am.  Restart oral BP meds as vitals require.    DVT (deep venous thrombosis) (HCC)- (present on admission)  Assessment & Plan  Heparin drip        VTE prophylaxis: SCDs/TEDs

## 2023-11-12 NOTE — ED NOTES
Med rec updated and complete. Allergies reviewed.  Interviewed family ( wife ) at bedside.  No antibiotic use in last 30 days.  No anticoagulant or antiplatelet medication.      Home pharmacy  [facility] = [phone]

## 2023-11-12 NOTE — CONSULTS
GASTROENTEROLOGY CONSULTATION    PATIENT NAME: Adam Yan  : 1955  CSN: [id number]  MRN:  [id number]     CONSULTATION DATE:  2023    PRIMARY CARE PROVIDER:  Fuentes Curran M.D.      REASON FOR CONSULT:  Obstructive jaundice from pancreatic cancer    HISTORY OF PRESENT ILLNESS:  Adam Yan is a 68 y.o. male with a pmhx of HTN, GERD, gout who presented 2023 with jaundice, poor oral intake and malaise.  The patient and his wife report approximately 1 month diagnosis of a pancreatic mass and lung metastasis.  He underwent a EGD/EUS on 10/31/2023 by Dr. Heredia at [place] for FNA of this mass and unfortunately the pathology has returned 3 days ago with adenocarcinoma.                                                                                Impression:            - Normal esophagus.                        - Normal stomach.                        - Erythematous duodenopathy.                        - There was diffuse abnormal echotexture in the visualized portion of the liver. This was characterized by a heterogenous appearance and a lobulated appearance.    Ascites was found on endosonographic examination of the peritoneal cavity.                        - A mass was identified in the uncinate process of the pancreas. However, the endosonographic appearance is suggestive of benign inflammatory changes. Fine needle biopsy performed.   There was dilation in the common bile duct which measured up to 10 mm.      Given the pulmonary metastasis this would stage him to stage IV pancreatic adenocarcinoma, they have a follow-up [place] [facility] [place]  Over the past week the patient's wife states he has been progressively weaker mostly staying in bed with the exception of going to the bathroom.  He has had incredibly poor oral intake and they have been trying bone broths and boost to increase his caloric intake.  He presented to the ER where he was found to have  a fairly unremarkable CBC, hyponatremia at 124, ADAM with a creatinine of 1.63, BUN 45, AST and ALT elevations at 110 and 150 respectively and an alkaline phosphatase of 788.  In addition to this his total bilirubin was noted at 12.2.  Other than his weakness, malaise and inability to eat he has noted some acid epigastric pain which does radiate to his back.  He denies any bowel or bladder changes, he has had some increasing dyspnea however no chest pain.  Given the abruptness of this symptoms onset and the acuity of his diagnosis he has not had the opportunity to discuss treatment plans or goals of care with his oncology team.     PAST MEDICAL HISTORY:  Past Medical History:   Diagnosis Date    Hypertension        PAST SURGICAL HISTORY:  Past Surgical History:   Procedure Laterality Date    KNEE ARTHROSCOPY Right         CURRENT MEDS:  Current Facility-Administered Medications   Medication Dose Route Frequency Provider Last Rate Last Admin    oxyCODONE CR (OxyCONTIN) tablet 10 mg  10 mg Oral Q12HRS Real Green M.D.   10 mg at 11/12/23 1007    HYDROmorphone (Dilaudid) injection 0.25 mg  0.25 mg Intravenous Q HOUR PRN Real Green M.D.   0.25 mg at 11/12/23 1303    hydrOXYzine HCl (Atarax) tablet 25 mg  25 mg Oral Once Rudolph Brown M.D.        omeprazole (PriLOSEC) capsule 20 mg  20 mg Oral DAILY Vinnie Matias M.D.   20 mg at 11/12/23 0545    buPROPion SR (Wellbutrin-SR) tablet 150 mg  150 mg Oral QAM Vinnie Matias M.D.   150 mg at 11/12/23 1007    oxyCODONE immediate-release (Roxicodone) tablet 5 mg  5 mg Oral Q3HRS PRN Vinnie Matias M.D.   5 mg at 11/12/23 1432    Or    oxyCODONE immediate release (Roxicodone) tablet 10 mg  10 mg Oral Q3HRS PRN Vinnie Matias M.D.   10 mg at 11/11/23 2137    labetalol (Normodyne/Trandate) injection 10 mg  10 mg Intravenous Q4HRS PRN Vinnie Matias M.D.        ondansetron (Zofran) syringe/vial injection 4 mg  4 mg Intravenous Q4HRS PRN Vinnie Matias M.D.         ondansetron (Zofran ODT) dispertab 4 mg  4 mg Oral Q4HRS PRN Vinnie Matias M.D.        lactated ringers infusion   Intravenous Continuous Vinnie Matias M.D. 100 mL/hr at 11/12/23 1001 New Bag at 11/12/23 1001    hydrOXYzine HCl (Atarax) tablet 25 mg  25 mg Oral TID PRN Vinnie Matias M.D.   25 mg at 11/11/23 2026    piperacillin-tazobactam (Zosyn) 4.5 g in  mL IVPB  4.5 g Intravenous Q8HRS Vinnie Matias M.D. 25 mL/hr at 11/12/23 1257 4.5 g at 11/12/23 1257    norepinephrine (Levophed) 8 mg in 250 mL NS infusion (premix)  0-1 mcg/kg/min (Ideal) Intravenous Continuous Cristino Sosa Jr., D.O. 10.5 mL/hr at 11/12/23 1446 0.07 mcg/kg/min at 11/12/23 1446        ALLERGIES:  No Known Allergies    SOCIAL HISTORY:  Social History     Socioeconomic History    Marital status:      Spouse name: Not on file    Number of children: Not on file    Years of education: Not on file    Highest education level: Not on file   Occupational History    Not on file   Tobacco Use    Smoking status: Never    Smokeless tobacco: Never   Vaping Use    Vaping Use: Never used   Substance and Sexual Activity    Alcohol use: Never    Drug use: No    Sexual activity: Not on file   Other Topics Concern    Not on file   Social History Narrative    Not on file     Social Determinants of Health     Financial Resource Strain: Not on file   Food Insecurity: Not on file   Transportation Needs: Not on file   Physical Activity: Not on file   Stress: Not on file   Social Connections: Not on file   Intimate Partner Violence: Not on file   Housing Stability: Not on file       FAMILY HISTORY:  History reviewed. No pertinent family history.     REVIEW OF SYSTEMS:  General ROS: Negative for - chills, fever, night sweats or weight loss.  HEENT ROS: Negative  Respiratory ROS: Negative for - cough or shortness of breath.  Cardiovascular ROS:  Negative for - chest pain or palpitations.  Gastrointestinal ROS: As per the history of present  "illness.  Genito-Urinary ROS: Negative  Musculoskeletal ROS: Negative.  Neurological ROS: Negative  Skin ROS: negative  Hematology ROS: negative  Endocrinology ROS: Negative        PHYSICAL EXAM:  VITALS: /62   Pulse 80   Temp 35.9 °C (96.7 °F) (Temporal)   Resp (!) 25   Ht 1.854 m (6' 1\")   Wt 95.7 kg (210 lb 15.7 oz)   SpO2 92%   BMI 27.84 kg/m²   GEN:  Adam Yan is a 68 y.o. male who is feeling poorly  HEENT: Mucous membranes pink and moist.  Sclera icteric.    NECK:    Neck supple without lymphadenopathy or thyromegaly.  LUNGS: Clear to auscultation posteriorly.  HEART: Regular rate and rhythm. S1 and S2 normal. No murmurs, gallops  ABD:  + BS nt/nd  RECTAL: Not done at this time.  EXT:  Without cyanosis, deformity or pitting edema.  SKIN:  Pink, warm, dry.  NEURO: Grossly intact, A/OR.    LABS:  Recent Labs     11/11/23  1409 11/11/23  2038 11/12/23  0600   WBC 10.1 9.9 10.6   MCV 93.4 94.2 94.0     Recent Labs     11/11/23  1409 11/11/23  1606 11/12/23  0600   GLUCOSE 122* 119* 144*   BUN 46* 45* 47*   CO2 18* 19* 17*     Lab Results   Component Value Date    INR 1.00 10/10/2023     No components found for: \"ALT\", \"AST\", \"GGT\", \"ALKPHOS\"  No results found for: \"BILINEO\"      @LASTIMGCAT([id number])@     @LASTIMGCAT(FM3969)@       IMPRESSION/PLAN:  Pancreatic adenocarcinoma - metastatic to lung, ascites present   Obstructive jaundice due to pancreatitic mass  Cholangitis - on antibiotics    ERCP with stent placement.   NPO  Consider palliative care consult       Steph Scott M.D.  Gastroenterology      "

## 2023-11-12 NOTE — ASSESSMENT & PLAN NOTE
Elevated total bilirubin in the setting of a gentleman with pancreatic adenocarcinoma with a mass of the pancreatic head likely representing biliary obstruction due to mass effect from his cancer.    11/12: CT A/P with 3cm pancreatic mass encasing portal, splenic and mesenteric veins with CBD dilation above this lesion indicative of mechanical compression and obstruction  Gi consult: Sonia, plan for biliary stent tomorrow AM, NPO at [facility]    Elevated transaminases and Tbili c/w hepatocellular injury, obstructive pattern with markedly elvated AP and possible cholangitis    Empiric antibiotics in the meantime   GI to see this AM for recs regarding decompression

## 2023-11-12 NOTE — ASSESSMENT & PLAN NOTE
History of right lower extremity DVT in August 2015  Lower extremity venous doppler with acute bilateral DVT  I am titrating heparin based upon serial anti-Xa determinations

## 2023-11-12 NOTE — HOSPITAL COURSE
11/12-Admit ICU shock (hypovelemic vs septic/distributive), hyperbili from mechanical obstruction d/t panc mass.  Titrating NE, Zosyn, GI to stent tomorrow AM, DNR/DNI

## 2023-11-12 NOTE — CARE PLAN
The patient is Watcher - Medium risk of patient condition declining or worsening    Shift Goals  Clinical Goals: hemodynamic stability, pain management  Patient Goals: Rest  Family Goals: Rest      Problem: Hemodynamics  Goal: Patient's hemodynamics, fluid balance and neurologic status will be stable or improve  Outcome: Progressing     Problem: Mechanical Ventilation  Goal: Patient will be able to express needs and understand communication  Outcome: Progressing

## 2023-11-12 NOTE — CARE PLAN
The patient is Watcher - Medium risk of patient condition declining or worsening    Shift Goals  Clinical Goals: hemodynamic stability, pain management  Patient Goals: Rest  Family Goals: Rest    Progress made toward(s) clinical / shift goals:    Problem: Knowledge Deficit - Standard  Goal: Patient and family/care givers will demonstrate understanding of plan of care, disease process/condition, diagnostic tests and medications  Outcome: Progressing  Note: Pt's BP is managed with norepinephrine gtt     Problem: Pain - Standard  Goal: Alleviation of pain or a reduction in pain to the patient’s comfort goal  Outcome: Progressing  Note: Pain is managed with IV analgesics       Patient is not progressing towards the following goals:

## 2023-11-12 NOTE — ASSESSMENT & PLAN NOTE
Secondary to an pancreatic mass  GI was consulted and offered a palliative biliary stent.  He has elected to not undergo this procedure and go home on hospice  Pain control  No evidence of fevers or signs of cholangitis

## 2023-11-12 NOTE — ASSESSMENT & PLAN NOTE
Diagnosed on 10/31  Metastatic to the lungs  Pain control  The patient has not started chemotherapy  Palliative consult  Follows Dr. Hatch as oncology in [place]  Dr Frey's note reviewed and no current chemotherapy options given his hyperbilirubinemia which will not improve unless he were to have a stent which he has declined.  Home on hospice  Pain control with oxycontin 20 mg BID scheduled and he continues to require prn oxycodone and IV dilaudid   Referred to home hospice with further palliative care

## 2023-11-12 NOTE — CONSULTS
Critical Care Consultation    Date of consult: 11/11/2023    Referring Physician  Vinnie Matias M.D.    Reason for Consultation  shock    History of Presenting Illness  68 y.o. male with a pmhx of HTN, GERD, gout who presented 11/11/2023 with jaundice, poor oral intake and malaise.  The patient and his wife report approximately 1 month diagnosis of a pancreatic mass and lung metastasis.  He underwent a EGD on 10/31/2023 by Dr. Heredia at [place] for biopsy of this mass and unfortunately the pathology has returned 3 days ago with adenocarcinoma.  Given the pulmonary metastasis this would stage him to stage IV pancreatic adenocarcinoma, they have a follow-[place] [facility]  Over the past week the patient's wife states he has been progressively weaker mostly staying in bed with the exception of going to the bathroom.  He has had incredibly poor oral intake and they have been trying bone broths and boost to increase his caloric intake.  He presented to the ER where he was found to have a fairly unremarkable CBC, hyponatremia at 124, ADAM with a creatinine of 1.63, BUN 45, AST and ALT elevations at 110 and 150 respectively and an alkaline phosphatase of 788.  In addition to this his total bilirubin was noted at 12.2.  Other than his weakness, malaise and inability to eat he has noted some acid epigastric pain which does radiate to his back.  He denies any bowel or bladder changes, he has had some increasing dyspnea however no chest pain.  Given the abruptness of this symptoms onset and the acuity of his diagnosis he has not had the opportunity to discuss treatment plans or goals of care with his oncology team.    Code Status  Full Code    Review of Systems  Review of Systems   Constitutional:  Positive for malaise/fatigue. Negative for chills and fever.   Eyes:  Negative for blurred vision.   Respiratory:  Positive for shortness of breath. Negative for cough, sputum  production and stridor.    Cardiovascular:  Negative for chest pain.   Gastrointestinal:  Positive for abdominal pain. Negative for nausea and vomiting.   Genitourinary:  Negative for dysuria.   Musculoskeletal:  Negative for myalgias.   Skin:  Negative for rash.   Neurological:  Positive for weakness. Negative for dizziness, sensory change and focal weakness.       Past Medical History   has a past medical history of Hypertension.    Surgical History   has a past surgical history that includes knee arthroscopy (Right).    Family History  family history is not on file.    Social History   reports that he has never smoked. He has never used smokeless tobacco. He reports that he does not drink alcohol and does not use drugs.    Medications  Home Medications       Reviewed by Braulio Diaz (Pharmacy Tech) on 11/11/23 at 1914  Med List Status: Complete     Medication Last Dose Status   buPROPion (WELLBUTRIN XL) 150 MG XL tablet 11/11/2023 Active   hydrOXYzine HCl (ATARAX) 25 MG Tab 11/9/2023 Active   lisinopril (PRINIVIL) 20 MG Tab 11/11/2023 Active   omeprazole (PRILOSEC) 20 MG delayed-release capsule 11/11/2023 Active   ondansetron (ZOFRAN ODT) 4 MG TABLET DISPERSIBLE 11/7/2023 Active   oxyCODONE CR (OXYCONTIN) 10 MG Tablet Extended Release 12 hour Abuse-Deterrent 11/11/2023 Active   oxyCODONE immediate-release (ROXICODONE) 5 MG Tab 11/8/2023 Active                  Current Facility-Administered Medications   Medication Dose Route Frequency Provider Last Rate Last Admin    hydrOXYzine HCl (Atarax) tablet 25 mg  25 mg Oral Once Rudolph Brown M.D.        [START ON 11/12/2023] omeprazole (PriLOSEC) capsule 20 mg  20 mg Oral DAILY Vinnie Matias M.D.        [START ON 11/12/2023] buPROPion SR (Wellbutrin-SR) tablet 150 mg  150 mg Oral QAM Vinnie Matais M.D.        [Held by provider] lisinopril (Prinivil) tablet 20 mg  20 mg Oral DAILY Vinnie Matias M.D.        Pharmacy Consult Request ...Pain Management Review 1  Each  1 Each Other PHARMACY TO DOSE Vinnie Matias M.D.        oxyCODONE immediate-release (Roxicodone) tablet 5 mg  5 mg Oral Q3HRS PRN Vinnie Matias M.D.        Or    oxyCODONE immediate release (Roxicodone) tablet 10 mg  10 mg Oral Q3HRS PRN Vinnie Matias M.D.   10 mg at 11/11/23 2137    Or    morphine 4 MG/ML injection 4 mg  4 mg Intravenous Q3HRS PRN Vinnie Matias M.D.        labetalol (Normodyne/Trandate) injection 10 mg  10 mg Intravenous Q4HRS PRN Vinnie Matias M.D.        ondansetron (Zofran) syringe/vial injection 4 mg  4 mg Intravenous Q4HRS PRN Vinnie Matias M.D.        ondansetron (Zofran ODT) dispertab 4 mg  4 mg Oral Q4HRS PRN Vinnie Matias M.D.        lactated ringers infusion   Intravenous Continuous Vinnie Matias M.D. 100 mL/hr at 11/11/23 2010 New Bag at 11/11/23 2010    hydrOXYzine HCl (Atarax) tablet 25 mg  25 mg Oral TID PRN Vinnie Matias M.D.   25 mg at 11/11/23 2026    [START ON 11/12/2023] piperacillin-tazobactam (Zosyn) 4.5 g in  mL IVPB  4.5 g Intravenous Q8HRS Vinnie Matias M.D.           Allergies  No Known Allergies    Vital Signs last 24 hours  Temp:  [36.8 °C (98.2 °F)-37 °C (98.6 °F)] 36.8 °C (98.2 °F)  Pulse:  [73-96] 96  Resp:  [17-20] 18  BP: ()/(48-60) 69/48  SpO2:  [92 %-95 %] 93 %    Physical Exam  Physical Exam  Vitals and nursing note reviewed.   Constitutional:       General: He is not in acute distress.     Appearance: Normal appearance. He is well-developed. He is obese. He is ill-appearing.   HENT:      Head: Normocephalic and atraumatic.      Right Ear: External ear normal.      Left Ear: External ear normal.      Nose: Nose normal.      Mouth/Throat:      Mouth: Mucous membranes are dry.   Eyes:      General: Scleral icterus present.      Extraocular Movements: Extraocular movements intact.      Conjunctiva/sclera: Conjunctivae normal.   Neck:      Vascular: No JVD.      Trachea: No tracheal deviation.   Cardiovascular:      Rate and Rhythm: Normal rate and  regular rhythm.      Pulses: Normal pulses.   Pulmonary:      Effort: Pulmonary effort is normal.      Breath sounds: Normal breath sounds. No rales.   Abdominal:      General: Bowel sounds are normal. There is no distension.      Palpations: Abdomen is soft.      Tenderness: There is abdominal tenderness (epigastric).   Musculoskeletal:         General: No tenderness.      Cervical back: Neck supple.      Right lower leg: No edema.      Left lower leg: No edema.   Skin:     General: Skin is warm and dry.      Capillary Refill: Capillary refill takes less than 2 seconds.      Coloration: Skin is jaundiced.      Findings: No rash.   Neurological:      General: No focal deficit present.      Mental Status: He is alert and oriented to person, place, and time.      Cranial Nerves: No cranial nerve deficit.      Sensory: No sensory deficit.      Motor: No weakness.   Psychiatric:         Mood and Affect: Mood normal.         Behavior: Behavior normal.         Fluids    Intake/Output Summary (Last 24 hours) at 2023 2307  Last data filed at 2023 2240  Gross per 24 hour   Intake 100 ml   Output --   Net 100 ml       Laboratory  Recent Results (from the past 48 hour(s))   EKG    Collection Time: 23  2:08 PM   Result Value Ref Range    Report       [facility] Emergency Dept[facility]    Test Date:  2023  Pt Name:    LAUREN LUGO               Department: ER  MRN:        [id number]                      Room:       R314  Gender:     Male                         Technician: [id number]  :        1955                   Requested By:ALOFNSO SOOD  Order #:    [id number]                    Reading MD:    Measurements  Intervals                                Axis  Rate:       78                           P:          41  OK:         170                          QRS:        33  QRSD:       113                          T:          -11  QT:         416  QTc:        474    Interpretive  Statements  Sinus rhythm  Borderline intraventricular conduction delay  Low voltage, extremity and precordial leads  Minimal ST depression, lateral leads  No previous ECG available for comparison     CBC with Differential    Collection Time: 11/11/23  2:09 PM   Result Value Ref Range    WBC 10.1 4.8 - 10.8 K/uL    RBC 4.11 (L) 4.70 - 6.10 M/uL    Hemoglobin 13.3 (L) 14.0 - 18.0 g/dL    Hematocrit 38.4 (L) 42.0 - 52.0 %    MCV 93.4 81.4 - 97.8 fL    MCH 32.4 27.0 - 33.0 pg    MCHC 34.6 32.3 - 36.5 g/dL    RDW 49.5 35.9 - 50.0 fL    Platelet Count 126 (L) 164 - 446 K/uL    MPV 9.6 9.0 - 12.9 fL    Neutrophils-Polys 77.60 (H) 44.00 - 72.00 %    Lymphocytes 11.30 (L) 22.00 - 41.00 %    Monocytes 9.10 0.00 - 13.40 %    Eosinophils 0.70 0.00 - 6.90 %    Basophils 0.30 0.00 - 1.80 %    Immature Granulocytes 1.00 (H) 0.00 - 0.90 %    Nucleated RBC 0.00 0.00 - 0.20 /100 WBC    Neutrophils (Absolute) 7.83 (H) 1.82 - 7.42 K/uL    Lymphs (Absolute) 1.14 1.00 - 4.80 K/uL    Monos (Absolute) 0.92 (H) 0.00 - 0.85 K/uL    Eos (Absolute) 0.07 0.00 - 0.51 K/uL    Baso (Absolute) 0.03 0.00 - 0.12 K/uL    Immature Granulocytes (abs) 0.10 0.00 - 0.11 K/uL    NRBC (Absolute) 0.00 K/uL   Comp Metabolic Panel    Collection Time: 11/11/23  2:09 PM   Result Value Ref Range    Sodium 124 (L) 135 - 145 mmol/L    Potassium 5.4 3.6 - 5.5 mmol/L    Chloride 93 (L) 96 - 112 mmol/L    Co2 18 (L) 20 - 33 mmol/L    Anion Gap 13.0 7.0 - 16.0    Glucose 122 (H) 65 - 99 mg/dL    Bun 46 (H) 8 - 22 mg/dL    Creatinine 1.11 0.50 - 1.40 mg/dL    Calcium 8.5 8.5 - 10.5 mg/dL    Correct Calcium 9.4 8.5 - 10.5 mg/dL    AST(SGOT) 127 (H) 12 - 45 U/L    ALT(SGPT) 151 (H) 2 - 50 U/L    Alkaline Phosphatase 768 (H) 30 - 99 U/L    Total Bilirubin 12.1 (H) 0.1 - 1.5 mg/dL    Albumin 2.9 (L) 3.2 - 4.9 g/dL    Total Protein 6.2 6.0 - 8.2 g/dL    Globulin 3.3 1.9 - 3.5 g/dL    A-G Ratio 0.9 g/dL   LIPASE    Collection Time: 11/11/23  2:09 PM   Result Value Ref Range     Lipase 26 11 - 82 U/L   ESTIMATED GFR    Collection Time: 11/11/23  2:09 PM   Result Value Ref Range    GFR (CKD-EPI) 72 >60 mL/min/1.73 m 2   POC CoV-2, FLU A/B, RSV by PCR    Collection Time: 11/11/23  2:19 PM   Result Value Ref Range    POC Influenza A RNA, PCR Negative Negative    POC Influenza B RNA, PCR Negative Negative    POC RSV, by PCR Negative Negative    POC SARS-CoV-2, PCR NotDetected    AMMONIA    Collection Time: 11/11/23  2:31 PM   Result Value Ref Range    Ammonia 23 11 - 45 umol/L   Comp Metabolic Panel    Collection Time: 11/11/23  4:06 PM   Result Value Ref Range    Sodium 126 (L) 135 - 145 mmol/L    Potassium 4.9 3.6 - 5.5 mmol/L    Chloride 94 (L) 96 - 112 mmol/L    Co2 19 (L) 20 - 33 mmol/L    Anion Gap 13.0 7.0 - 16.0    Glucose 119 (H) 65 - 99 mg/dL    Bun 45 (H) 8 - 22 mg/dL    Creatinine 1.63 (H) 0.50 - 1.40 mg/dL    Calcium 8.6 8.5 - 10.5 mg/dL    Correct Calcium 9.6 8.5 - 10.5 mg/dL    AST(SGOT) 110 (H) 12 - 45 U/L    ALT(SGPT) 150 (H) 2 - 50 U/L    Alkaline Phosphatase 788 (H) 30 - 99 U/L    Total Bilirubin 12.2 (H) 0.1 - 1.5 mg/dL    Albumin 2.8 (L) 3.2 - 4.9 g/dL    Total Protein 6.1 6.0 - 8.2 g/dL    Globulin 3.3 1.9 - 3.5 g/dL    A-G Ratio 0.8 g/dL   LIPASE    Collection Time: 11/11/23  4:06 PM   Result Value Ref Range    Lipase 24 11 - 82 U/L   ESTIMATED GFR    Collection Time: 11/11/23  4:06 PM   Result Value Ref Range    GFR (CKD-EPI) 46 (A) >60 mL/min/1.73 m 2   MAGNESIUM    Collection Time: 11/11/23  4:06 PM   Result Value Ref Range    Magnesium 2.4 1.5 - 2.5 mg/dL   PHOSPHORUS    Collection Time: 11/11/23  4:06 PM   Result Value Ref Range    Phosphorus 4.2 2.5 - 4.5 mg/dL   proBrain Natriuretic Peptide, NT    Collection Time: 11/11/23  4:06 PM   Result Value Ref Range    NT-proBNP 1358 (H) 0 - 125 pg/mL   Lactic Acid -STAT Once    Collection Time: 11/11/23  8:38 PM   Result Value Ref Range    Lactic Acid 2.1 (H) 0.5 - 2.0 mmol/L   TROPONIN    Collection Time: 11/11/23  8:38 PM    Result Value Ref Range    Troponin T 87 (H) 6 - 19 ng/L   CBC WITH DIFFERENTIAL    Collection Time: 23  8:38 PM   Result Value Ref Range    WBC 9.9 4.8 - 10.8 K/uL    RBC 4.28 (L) 4.70 - 6.10 M/uL    Hemoglobin 13.8 (L) 14.0 - 18.0 g/dL    Hematocrit 40.3 (L) 42.0 - 52.0 %    MCV 94.2 81.4 - 97.8 fL    MCH 32.2 27.0 - 33.0 pg    MCHC 34.2 32.3 - 36.5 g/dL    RDW 51.1 (H) 35.9 - 50.0 fL    Platelet Count 129 (L) 164 - 446 K/uL    MPV 9.7 9.0 - 12.9 fL    Neutrophils-Polys 76.40 (H) 44.00 - 72.00 %    Lymphocytes 12.60 (L) 22.00 - 41.00 %    Monocytes 8.50 0.00 - 13.40 %    Eosinophils 0.60 0.00 - 6.90 %    Basophils 0.40 0.00 - 1.80 %    Immature Granulocytes 1.50 (H) 0.00 - 0.90 %    Nucleated RBC 0.00 0.00 - 0.20 /100 WBC    Neutrophils (Absolute) 7.56 (H) 1.82 - 7.42 K/uL    Lymphs (Absolute) 1.25 1.00 - 4.80 K/uL    Monos (Absolute) 0.84 0.00 - 0.85 K/uL    Eos (Absolute) 0.06 0.00 - 0.51 K/uL    Baso (Absolute) 0.04 0.00 - 0.12 K/uL    Immature Granulocytes (abs) 0.15 (H) 0.00 - 0.11 K/uL    NRBC (Absolute) 0.00 K/uL   EKG    Collection Time: 23  9:07 PM   Result Value Ref Range    Report       [facility]    Test Date:  2023  Pt Name:    LAUREN LUGO               Department: ER  MRN:        [id number]                      Room:       14  Gender:     Male                         Technician: LUI  :        1955                   Requested By:ER TRIAGE PROTOCOL  Order #:    [id number]                    Reading MD:    Measurements  Intervals                                Axis  Rate:       92                           P:          23  CO:         179                          QRS:        -31  QRSD:       119                          T:          -18  QT:         354  QTc:        438    Interpretive Statements  Sinus rhythm  Incomplete right bundle branch block  Low voltage, precordial leads  Compared to ECG 2023 14:08:27  Incomplete right bundle-branch block now present  ST (T  wave) deviation no longer present     EKG    Collection Time: 23  9:07 PM   Result Value Ref Range    Report       [facility]    Test Date:  2023  Pt Name:    LAUREN LUGO               Department: ER  MRN:        [id number]                      Room:       R314  Gender:     Male                         Technician: LUI  :        1955                   Requested By:ALMITA FARIAS  Order #:    [id number]                    Reading MD:    Measurements  Intervals                                Axis  Rate:       92                           P:          23  ME:         179                          QRS:        -31  QRSD:       119                          T:          -18  QT:         354  QTc:        438    Interpretive Statements  Sinus rhythm  Incomplete right bundle branch block  Low voltage, precordial leads  Compared to ECG 2023 14:08:27  Incomplete right bundle-branch block now present  ST (T wave) deviation no longer present         Imaging  DX-CHEST-PORTABLE (1 VIEW)   Final Result      Hypoinflation with prominent interstitial and numerous small ill-defined pulmonary nodules bilaterally may indicate granulomatous disease, pneumonitis or neoplastic process.      EC-ECHOCARDIOGRAM COMPLETE W/O CONT    (Results Pending)   CT-ABDOMEN-PELVIS WITH    (Results Pending)       Assessment/Plan  * Biliary obstruction- (present on admission)  Assessment & Plan  Elevated total bilirubin in the setting of a gentleman with pancreatic adenocarcinoma with a mass of the pancreatic head likely representing biliary obstruction due to mass effect from his cancer.  CT of the abdomen and pelvis is pending  GI consult in the morning for endoscopic intervention    Goals of care, counseling/discussion  Assessment & Plan  Patient is listed as full code. I had a long discussion with the patient and his wife regarding his values and hopes for his treatment in the setting of his newly diagnosed, advanced  pancreatic adenocarcinoma.  They would like to discuss its CODE STATUS amongst themselves and will let us know.    Shock (HCC)- (present on admission)  Assessment & Plan  Most likely etiology is hypovolemia given clinical scenario  Echocardiogram has been ordered by the hospitalist service  For completeness will add TSH and cortisol  Ongoing volume resuscitation  Transfer to ICU for possible vasopressor initiation    Elevated liver enzymes- (present on admission)  Assessment & Plan  Likely due to pancreatic adenocarcinoma and biliary obstruction  Trend AST and ALT  Avoid hepatotoxins  Monitor synthetic function    Acute renal failure (HCC)- (present on admission)  Assessment & Plan  Labs consistent with prerenal etiology however hypotension may be contributing to a component of ATN  IVF  Renal dose meds, avoid nephrotoxins  Strict I/Os  Follow renal function    Pancreatic adenocarcinoma (HCC)- (present on admission)  Assessment & Plan  Likely stage IV pancreatic adenocarcinoma with metastasis to the lungs  5-year prognosis discussed with the patient and his wife  Has not been scheduled for neoadjuvant chemotherapy or surgery yet  Now with biliary obstruction and progressive worsening of his functional status  Oncology consult in the morning            Discussed patient condition and risk of morbidity and/or mortality with Hospitalist, Family, RN, Pharmacy, Code status disscussed, Charge nurse / hot rounds, and Patient.      The patient remains critically ill.  Critical care time = 45 minutes in directly providing and coordinating critical care and extensive data review.  No time overlap and excludes procedures.    Please note that this dictation was created using voice recognition software. The accuracy of the dictation is limited to the abilities of the software. I have made every reasonable attempt to correct obvious errors, but I expect that there are errors of grammar and possibly content that I did not discover  before finalizing the note.

## 2023-11-13 ENCOUNTER — APPOINTMENT (OUTPATIENT)
Dept: RADIOLOGY | Facility: MEDICAL CENTER | Age: 68
DRG: 173 | End: 2023-11-13
Attending: INTERNAL MEDICINE
Payer: COMMERCIAL

## 2023-11-13 PROBLEM — I26.09 ACUTE PULMONARY EMBOLISM WITH ACUTE COR PULMONALE (HCC): Status: ACTIVE | Noted: 2023-11-13

## 2023-11-13 PROBLEM — I50.810 RIGHT VENTRICULAR FAILURE (HCC): Status: ACTIVE | Noted: 2023-11-13

## 2023-11-13 LAB
ALBUMIN SERPL BCP-MCNC: 2.5 G/DL (ref 3.2–4.9)
ALBUMIN/GLOB SERPL: 0.8 G/DL
ALP SERPL-CCNC: 676 U/L (ref 30–99)
ALT SERPL-CCNC: 133 U/L (ref 2–50)
ANION GAP SERPL CALC-SCNC: 15 MMOL/L (ref 7–16)
APTT PPP: 28.7 SEC (ref 24.7–36)
APTT PPP: 29.8 SEC (ref 24.7–36)
APTT PPP: 52.3 SEC (ref 24.7–36)
AST SERPL-CCNC: 101 U/L (ref 12–45)
BASOPHILS # BLD AUTO: 0.5 % (ref 0–1.8)
BASOPHILS # BLD: 0.05 K/UL (ref 0–0.12)
BILIRUB SERPL-MCNC: 9.8 MG/DL (ref 0.1–1.5)
BUN SERPL-MCNC: 44 MG/DL (ref 8–22)
CALCIUM ALBUM COR SERPL-MCNC: 9.3 MG/DL (ref 8.5–10.5)
CALCIUM SERPL-MCNC: 8.1 MG/DL (ref 8.5–10.5)
CHLORIDE SERPL-SCNC: 96 MMOL/L (ref 96–112)
CHOLEST SERPL-MCNC: 258 MG/DL (ref 100–199)
CO2 SERPL-SCNC: 16 MMOL/L (ref 20–33)
CORTIS SERPL-MCNC: 19.9 UG/DL (ref 0–23)
CREAT SERPL-MCNC: 1.35 MG/DL (ref 0.5–1.4)
EOSINOPHIL # BLD AUTO: 0.04 K/UL (ref 0–0.51)
EOSINOPHIL NFR BLD: 0.4 % (ref 0–6.9)
ERYTHROCYTE [DISTWIDTH] IN BLOOD BY AUTOMATED COUNT: 50 FL (ref 35.9–50)
ERYTHROCYTE [DISTWIDTH] IN BLOOD BY AUTOMATED COUNT: 53 FL (ref 35.9–50)
GFR SERPLBLD CREATININE-BSD FMLA CKD-EPI: 57 ML/MIN/1.73 M 2
GLOBULIN SER CALC-MCNC: 3 G/DL (ref 1.9–3.5)
GLUCOSE SERPL-MCNC: 140 MG/DL (ref 65–99)
HCT VFR BLD AUTO: 36.5 % (ref 42–52)
HCT VFR BLD AUTO: 37 % (ref 42–52)
HDLC SERPL-MCNC: 15 MG/DL
HGB BLD-MCNC: 12.6 G/DL (ref 14–18)
HGB BLD-MCNC: 13 G/DL (ref 14–18)
IMM GRANULOCYTES # BLD AUTO: 0.2 K/UL (ref 0–0.11)
IMM GRANULOCYTES NFR BLD AUTO: 1.9 % (ref 0–0.9)
INR PPP: 1.75 (ref 0.87–1.13)
INR PPP: 1.79 (ref 0.87–1.13)
INR PPP: 1.97 (ref 0.87–1.13)
LDLC SERPL CALC-MCNC: 195 MG/DL
LYMPHOCYTES # BLD AUTO: 0.9 K/UL (ref 1–4.8)
LYMPHOCYTES NFR BLD: 8.4 % (ref 22–41)
MAGNESIUM SERPL-MCNC: 2.1 MG/DL (ref 1.5–2.5)
MCH RBC QN AUTO: 32.3 PG (ref 27–33)
MCH RBC QN AUTO: 32.8 PG (ref 27–33)
MCHC RBC AUTO-ENTMCNC: 34.1 G/DL (ref 32.3–36.5)
MCHC RBC AUTO-ENTMCNC: 35.6 G/DL (ref 32.3–36.5)
MCV RBC AUTO: 92.2 FL (ref 81.4–97.8)
MCV RBC AUTO: 94.9 FL (ref 81.4–97.8)
MONOCYTES # BLD AUTO: 0.91 K/UL (ref 0–0.85)
MONOCYTES NFR BLD AUTO: 8.5 % (ref 0–13.4)
NEUTROPHILS # BLD AUTO: 8.65 K/UL (ref 1.82–7.42)
NEUTROPHILS NFR BLD: 80.3 % (ref 44–72)
NRBC # BLD AUTO: 0 K/UL
NRBC BLD-RTO: 0 /100 WBC (ref 0–0.2)
PHOSPHATE SERPL-MCNC: 4 MG/DL (ref 2.5–4.5)
PLATELET # BLD AUTO: 120 K/UL (ref 164–446)
PLATELET # BLD AUTO: 136 K/UL (ref 164–446)
PMV BLD AUTO: 9.6 FL (ref 9–12.9)
PMV BLD AUTO: 9.7 FL (ref 9–12.9)
POTASSIUM SERPL-SCNC: 4.8 MMOL/L (ref 3.6–5.5)
PROT SERPL-MCNC: 5.5 G/DL (ref 6–8.2)
PROTHROMBIN TIME: 20.7 SEC (ref 12–14.6)
PROTHROMBIN TIME: 21.1 SEC (ref 12–14.6)
PROTHROMBIN TIME: 22.7 SEC (ref 12–14.6)
RBC # BLD AUTO: 3.9 M/UL (ref 4.7–6.1)
RBC # BLD AUTO: 3.96 M/UL (ref 4.7–6.1)
SODIUM SERPL-SCNC: 127 MMOL/L (ref 135–145)
TRIGL SERPL-MCNC: 238 MG/DL (ref 0–149)
TSH SERPL DL<=0.005 MIU/L-ACNC: 3 UIU/ML (ref 0.38–5.33)
UFH PPP CHRO-ACNC: 0.1 IU/ML
UFH PPP CHRO-ACNC: <0.1 IU/ML
WBC # BLD AUTO: 10.8 K/UL (ref 4.8–10.8)
WBC # BLD AUTO: 10.8 K/UL (ref 4.8–10.8)

## 2023-11-13 PROCEDURE — 83735 ASSAY OF MAGNESIUM: CPT

## 2023-11-13 PROCEDURE — 700101 HCHG RX REV CODE 250: Performed by: INTERNAL MEDICINE

## 2023-11-13 PROCEDURE — 85610 PROTHROMBIN TIME: CPT

## 2023-11-13 PROCEDURE — 700117 HCHG RX CONTRAST REV CODE 255: Performed by: INTERNAL MEDICINE

## 2023-11-13 PROCEDURE — 700105 HCHG RX REV CODE 258: Performed by: HOSPITALIST

## 2023-11-13 PROCEDURE — 700111 HCHG RX REV CODE 636 W/ 250 OVERRIDE (IP): Performed by: INTERNAL MEDICINE

## 2023-11-13 PROCEDURE — 85520 HEPARIN ASSAY: CPT

## 2023-11-13 PROCEDURE — A9270 NON-COVERED ITEM OR SERVICE: HCPCS | Performed by: EMERGENCY MEDICINE

## 2023-11-13 PROCEDURE — 93970 EXTREMITY STUDY: CPT

## 2023-11-13 PROCEDURE — 99153 MOD SED SAME PHYS/QHP EA: CPT

## 2023-11-13 PROCEDURE — 84443 ASSAY THYROID STIM HORMONE: CPT

## 2023-11-13 PROCEDURE — 85027 COMPLETE CBC AUTOMATED: CPT

## 2023-11-13 PROCEDURE — 93970 EXTREMITY STUDY: CPT | Mod: 26 | Performed by: INTERNAL MEDICINE

## 2023-11-13 PROCEDURE — 82533 TOTAL CORTISOL: CPT

## 2023-11-13 PROCEDURE — 85025 COMPLETE CBC W/AUTO DIFF WBC: CPT

## 2023-11-13 PROCEDURE — 02FR3Z0 FRAGMENTATION OF LEFT PULMONARY ARTERY, PERCUTANEOUS APPROACH, ULTRASONIC: ICD-10-PCS | Performed by: RADIOLOGY

## 2023-11-13 PROCEDURE — 71275 CT ANGIOGRAPHY CHEST: CPT

## 2023-11-13 PROCEDURE — 700111 HCHG RX REV CODE 636 W/ 250 OVERRIDE (IP): Performed by: EMERGENCY MEDICINE

## 2023-11-13 PROCEDURE — 99232 SBSQ HOSP IP/OBS MODERATE 35: CPT | Performed by: NURSE PRACTITIONER

## 2023-11-13 PROCEDURE — 02FQ3Z0 FRAGMENTATION OF RIGHT PULMONARY ARTERY, PERCUTANEOUS APPROACH, ULTRASONIC: ICD-10-PCS | Performed by: RADIOLOGY

## 2023-11-13 PROCEDURE — 770022 HCHG ROOM/CARE - ICU (200)

## 2023-11-13 PROCEDURE — 80061 LIPID PANEL: CPT

## 2023-11-13 PROCEDURE — A9270 NON-COVERED ITEM OR SERVICE: HCPCS | Performed by: HOSPITALIST

## 2023-11-13 PROCEDURE — 99497 ADVNCD CARE PLAN 30 MIN: CPT | Mod: 25 | Performed by: NURSE PRACTITIONER

## 2023-11-13 PROCEDURE — 99222 1ST HOSP IP/OBS MODERATE 55: CPT | Mod: 25 | Performed by: NURSE PRACTITIONER

## 2023-11-13 PROCEDURE — 99291 CRITICAL CARE FIRST HOUR: CPT | Performed by: INTERNAL MEDICINE

## 2023-11-13 PROCEDURE — 700102 HCHG RX REV CODE 250 W/ 637 OVERRIDE(OP): Performed by: HOSPITALIST

## 2023-11-13 PROCEDURE — 700111 HCHG RX REV CODE 636 W/ 250 OVERRIDE (IP): Mod: JZ | Performed by: HOSPITALIST

## 2023-11-13 PROCEDURE — 3E06317 INTRODUCTION OF OTHER THROMBOLYTIC INTO CENTRAL ARTERY, PERCUTANEOUS APPROACH: ICD-10-PCS | Performed by: RADIOLOGY

## 2023-11-13 PROCEDURE — 85730 THROMBOPLASTIN TIME PARTIAL: CPT

## 2023-11-13 PROCEDURE — 700111 HCHG RX REV CODE 636 W/ 250 OVERRIDE (IP): Performed by: RADIOLOGY

## 2023-11-13 PROCEDURE — 80053 COMPREHEN METABOLIC PANEL: CPT

## 2023-11-13 PROCEDURE — 84100 ASSAY OF PHOSPHORUS: CPT

## 2023-11-13 PROCEDURE — 99292 CRITICAL CARE ADDL 30 MIN: CPT | Performed by: INTERNAL MEDICINE

## 2023-11-13 PROCEDURE — 700105 HCHG RX REV CODE 258: Performed by: INTERNAL MEDICINE

## 2023-11-13 PROCEDURE — 700102 HCHG RX REV CODE 250 W/ 637 OVERRIDE(OP): Performed by: EMERGENCY MEDICINE

## 2023-11-13 PROCEDURE — 700111 HCHG RX REV CODE 636 W/ 250 OVERRIDE (IP)

## 2023-11-13 RX ORDER — ONDANSETRON 2 MG/ML
4 INJECTION INTRAMUSCULAR; INTRAVENOUS PRN
Status: ACTIVE | OUTPATIENT
Start: 2023-11-13 | End: 2023-11-13

## 2023-11-13 RX ORDER — HEPARIN SODIUM 5000 [USP'U]/100ML
INJECTION, SOLUTION INTRAVENOUS
Status: DISPENSED
Start: 2023-11-13 | End: 2023-11-13

## 2023-11-13 RX ORDER — SODIUM CHLORIDE 9 MG/ML
INJECTION, SOLUTION INTRAVENOUS CONTINUOUS
Status: DISCONTINUED | OUTPATIENT
Start: 2023-11-13 | End: 2023-11-13

## 2023-11-13 RX ORDER — HEPARIN SODIUM 1000 [USP'U]/ML
40 INJECTION, SOLUTION INTRAVENOUS; SUBCUTANEOUS PRN
Status: DISCONTINUED | OUTPATIENT
Start: 2023-11-13 | End: 2023-11-13

## 2023-11-13 RX ORDER — MIDAZOLAM HYDROCHLORIDE 1 MG/ML
INJECTION INTRAMUSCULAR; INTRAVENOUS
Status: COMPLETED
Start: 2023-11-13 | End: 2023-11-13

## 2023-11-13 RX ORDER — HEPARIN SODIUM 5000 [USP'U]/100ML
500 INJECTION, SOLUTION INTRAVENOUS CONTINUOUS
Status: DISCONTINUED | OUTPATIENT
Start: 2023-11-13 | End: 2023-11-13

## 2023-11-13 RX ORDER — SODIUM CHLORIDE 9 MG/ML
500 INJECTION, SOLUTION INTRAVENOUS
Status: ACTIVE | OUTPATIENT
Start: 2023-11-13 | End: 2023-11-13

## 2023-11-13 RX ORDER — SODIUM CHLORIDE 9 MG/ML
INJECTION, SOLUTION INTRAVENOUS CONTINUOUS
Status: ACTIVE | OUTPATIENT
Start: 2023-11-13 | End: 2023-11-13

## 2023-11-13 RX ORDER — HEPARIN SODIUM 1000 [USP'U]/ML
INJECTION, SOLUTION INTRAVENOUS; SUBCUTANEOUS
Status: DISPENSED
Start: 2023-11-13 | End: 2023-11-13

## 2023-11-13 RX ORDER — HEPARIN SODIUM 5000 [USP'U]/100ML
0-30 INJECTION, SOLUTION INTRAVENOUS CONTINUOUS
Status: DISCONTINUED | OUTPATIENT
Start: 2023-11-13 | End: 2023-11-13

## 2023-11-13 RX ORDER — HEPARIN SODIUM 5000 [USP'U]/100ML
500 INJECTION, SOLUTION INTRAVENOUS CONTINUOUS
Status: ACTIVE | OUTPATIENT
Start: 2023-11-13 | End: 2023-11-13

## 2023-11-13 RX ORDER — HEPARIN SODIUM 5000 [USP'U]/100ML
0-30 INJECTION, SOLUTION INTRAVENOUS CONTINUOUS
Status: DISCONTINUED | OUTPATIENT
Start: 2023-11-13 | End: 2023-11-18

## 2023-11-13 RX ORDER — MIDAZOLAM HYDROCHLORIDE 1 MG/ML
.5-2 INJECTION INTRAMUSCULAR; INTRAVENOUS PRN
Status: ACTIVE | OUTPATIENT
Start: 2023-11-13 | End: 2023-11-13

## 2023-11-13 RX ORDER — HEPARIN SODIUM 1000 [USP'U]/ML
40 INJECTION, SOLUTION INTRAVENOUS; SUBCUTANEOUS PRN
Status: DISCONTINUED | OUTPATIENT
Start: 2023-11-13 | End: 2023-11-18

## 2023-11-13 RX ORDER — HEPARIN SODIUM 1000 [USP'U]/ML
80 INJECTION, SOLUTION INTRAVENOUS; SUBCUTANEOUS ONCE
Status: COMPLETED | OUTPATIENT
Start: 2023-11-13 | End: 2023-11-13

## 2023-11-13 RX ORDER — LORAZEPAM 2 MG/ML
0.5 INJECTION INTRAMUSCULAR EVERY 4 HOURS PRN
Status: DISCONTINUED | OUTPATIENT
Start: 2023-11-13 | End: 2023-11-20

## 2023-11-13 RX ADMIN — PIPERACILLIN AND TAZOBACTAM 4.5 G: 4; .5 INJECTION, POWDER, FOR SOLUTION INTRAVENOUS at 21:02

## 2023-11-13 RX ADMIN — OXYCODONE 5 MG: 5 TABLET ORAL at 01:10

## 2023-11-13 RX ADMIN — PIPERACILLIN AND TAZOBACTAM 4.5 G: 4; .5 INJECTION, POWDER, FOR SOLUTION INTRAVENOUS at 14:38

## 2023-11-13 RX ADMIN — NOREPINEPHRINE BITARTRATE 0.1 MCG/KG/MIN: 1 INJECTION, SOLUTION, CONCENTRATE INTRAVENOUS at 08:05

## 2023-11-13 RX ADMIN — HYDROMORPHONE HYDROCHLORIDE 0.25 MG: 1 INJECTION, SOLUTION INTRAMUSCULAR; INTRAVENOUS; SUBCUTANEOUS at 18:28

## 2023-11-13 RX ADMIN — HYDROMORPHONE HYDROCHLORIDE 0.25 MG: 1 INJECTION, SOLUTION INTRAMUSCULAR; INTRAVENOUS; SUBCUTANEOUS at 06:26

## 2023-11-13 RX ADMIN — OMEPRAZOLE 20 MG: 20 CAPSULE, DELAYED RELEASE ORAL at 05:14

## 2023-11-13 RX ADMIN — MIDAZOLAM HYDROCHLORIDE 0.5 MG: 1 INJECTION, SOLUTION INTRAMUSCULAR; INTRAVENOUS at 12:35

## 2023-11-13 RX ADMIN — OXYCODONE HYDROCHLORIDE 10 MG: 10 TABLET, FILM COATED, EXTENDED RELEASE ORAL at 05:14

## 2023-11-13 RX ADMIN — FENTANYL CITRATE 25 MCG: 50 INJECTION, SOLUTION INTRAMUSCULAR; INTRAVENOUS at 12:35

## 2023-11-13 RX ADMIN — PIPERACILLIN AND TAZOBACTAM 4.5 G: 4; .5 INJECTION, POWDER, FOR SOLUTION INTRAVENOUS at 05:16

## 2023-11-13 RX ADMIN — BUPROPION HYDROCHLORIDE 150 MG: 150 TABLET, EXTENDED RELEASE ORAL at 05:13

## 2023-11-13 RX ADMIN — OXYCODONE HYDROCHLORIDE 10 MG: 10 TABLET, FILM COATED, EXTENDED RELEASE ORAL at 19:24

## 2023-11-13 RX ADMIN — FENTANYL CITRATE 25 MCG: 50 INJECTION, SOLUTION INTRAMUSCULAR; INTRAVENOUS at 13:08

## 2023-11-13 RX ADMIN — HEPARIN SODIUM 18 UNITS/KG/HR: 5000 INJECTION, SOLUTION INTRAVENOUS at 21:06

## 2023-11-13 RX ADMIN — IOHEXOL 25 ML: 300 INJECTION, SOLUTION INTRAVENOUS at 14:45

## 2023-11-13 RX ADMIN — MIDAZOLAM 0.5 MG: 1 INJECTION, SOLUTION INTRAMUSCULAR; INTRAVENOUS at 12:35

## 2023-11-13 RX ADMIN — MIDAZOLAM 0.5 MG: 1 INJECTION, SOLUTION INTRAMUSCULAR; INTRAVENOUS at 12:56

## 2023-11-13 RX ADMIN — IOHEXOL 100 ML: 350 INJECTION, SOLUTION INTRAVENOUS at 09:15

## 2023-11-13 RX ADMIN — HEPARIN SODIUM 8000 UNITS: 1000 INJECTION, SOLUTION INTRAVENOUS; SUBCUTANEOUS at 11:08

## 2023-11-13 RX ADMIN — HYDROMORPHONE HYDROCHLORIDE 0.25 MG: 1 INJECTION, SOLUTION INTRAMUSCULAR; INTRAVENOUS; SUBCUTANEOUS at 02:16

## 2023-11-13 RX ADMIN — HEPARIN SODIUM 18 UNITS/KG/HR: 5000 INJECTION, SOLUTION INTRAVENOUS at 11:07

## 2023-11-13 RX ADMIN — LORAZEPAM 0.5 MG: 2 INJECTION INTRAMUSCULAR; INTRAVENOUS at 20:58

## 2023-11-13 RX ADMIN — HEPARIN SODIUM 4000 UNITS: 1000 INJECTION, SOLUTION INTRAVENOUS; SUBCUTANEOUS at 21:08

## 2023-11-13 RX ADMIN — LORAZEPAM 0.5 MG: 2 INJECTION INTRAMUSCULAR; INTRAVENOUS at 16:53

## 2023-11-13 ASSESSMENT — PAIN DESCRIPTION - PAIN TYPE
TYPE: ACUTE PAIN

## 2023-11-13 ASSESSMENT — ENCOUNTER SYMPTOMS
VOMITING: 0
BLOOD IN STOOL: 0
ORTHOPNEA: 0
SHORTNESS OF BREATH: 1
CONSTIPATION: 1
PALPITATIONS: 0
NERVOUS/ANXIOUS: 1
COUGH: 0
WEIGHT LOSS: 1
ABDOMINAL PAIN: 0
HEARTBURN: 0
WEAKNESS: 1
DIZZINESS: 0
CHILLS: 0
SPEECH CHANGE: 0
FOCAL WEAKNESS: 0
HEADACHES: 0
DIARRHEA: 0
BLURRED VISION: 0
NAUSEA: 0
FEVER: 0
BACK PAIN: 1
WHEEZING: 0
DEPRESSION: 0
INSOMNIA: 1
ABDOMINAL PAIN: 1
SHORTNESS OF BREATH: 0

## 2023-11-13 ASSESSMENT — FIBROSIS 4 INDEX: FIB4 SCORE: 4.13

## 2023-11-13 NOTE — ASSESSMENT & PLAN NOTE
Patient has history of Stage IV pancreatic adenocarcinoma  with pulmonary and peritoneal metastases, leading to current biliary obstruction as well as bilateral DVTs/PES    -Continue to monitor clinical status  -Palliative care following  -Oncology consulted, with patient and family still deciding to pursue treatment vs comfort measures/hospice

## 2023-11-13 NOTE — OR SURGEON
Immediate Post- Operative Note        PostOp Diagnosis: PE WITH RIGHT HEART STRAIN, SUBMASSIVE PE      Procedure(s): EKOS PROTOCOL FOR PE    RIGHT CFV PUNCTURE X 2.     RIGHT PULMONARY ARTERY MEAN PRESSURE 37 MM HG    RIGHT PULMONARY ANGIOGRAM.    LEFT PULMONARY ARTERY MEAN PRESSURE 37 MM HG    RIGHT PULMONARY EKOS CATHETER PLACED WITH TIP IN RLL SEGMENTAL BRANCH    LEFT PULMONARY EKOS CATHETER PLACED WITH TIP IN LLL SEGMENTAL BRANCH    INITIATE 4 HOUR INFUSION TPA (CONCENTRATION 10 MG  ML NS)  EACH CATHETER AT RATE 1 MG/HOUR (10 ML/HOUR)     FINDINGS: RPA ANGIO SHOWS LARGE CLOT FILLING DEFECT.     BOTH CATHETERS IN GOOD POSITION       Estimated Blood Loss: Less than 10 ml (FLUSHES)        Complications: None              11/13/2023  1:22 PM  Misha Kirby M.D.

## 2023-11-13 NOTE — PROGRESS NOTES
Critical Care Progress Note    Date of admission  11/11/2023    Chief Complaint  68 y.o. male admitted 11/11/2023 with worsening weakness.  He has a history of stage IV adenocarcinoma the pancreas, primary hypertension, GERD and gout.    Hospital Course      11/12-Admit ICU shock (hypovelemic vs septic/distributive), hyperbili from mechanical obstruction d/t panc mass.  Titrating NE, Zosyn, GI to stent tomorrow AM, DNR/DNI    11/13 -    CTA with bilateral pulmonary emboli and lower extremity venous Doppler with DVT.  Echocardiogram confirms acute right heart failure.  EKOS today.  Begin full anticoagulation with heparin.  Hold on biliary stent.      Interval Problem Update  Reviewed last 24 hour events:      SR  NE  98.1  +2361 mL in the last 24  +6289 mL since admit      Review of Systems  Review of Systems   Unable to perform ROS: Acuity of condition        Vital Signs for last 24 hours   Temp:  [36.1 °C (97 °F)-36.7 °C (98.1 °F)] 36.1 °C (97 °F)  Pulse:  [78-99] 87  Resp:  [17-47] 32  BP: ()/(53-76) 98/55  SpO2:  [88 %-96 %] 91 %    Hemodynamic parameters for last 24 hours  CVP:  [36 MM HG] 36 MM HG    Respiratory Information for the last 24 hours       Physical Exam   Physical Exam  Constitutional:       Appearance: He is not diaphoretic.   HENT:      Head: Normocephalic.      Mouth/Throat:      Pharynx: Oropharynx is clear.   Eyes:      General: Scleral icterus present.      Pupils: Pupils are equal, round, and reactive to light.   Cardiovascular:      Comments: Sinus rhythm  Pulmonary:      Breath sounds: Rales (Very few scattered crackles) present. No wheezing.   Abdominal:      General: There is no distension.      Tenderness: There is no abdominal tenderness.   Musculoskeletal:      Right lower leg: No edema.      Left lower leg: No edema.   Skin:     General: Skin is warm.   Neurological:      General: No focal deficit present.      Mental Status: He is oriented to person, place, and time.       Cranial Nerves: No cranial nerve deficit.         Medications  Current Facility-Administered Medications   Medication Dose Route Frequency Provider Last Rate Last Admin    LORazepam (Ativan) injection 0.5 mg  0.5 mg Intravenous Q4HRS PRN Cosme Traylor M.D.        [Held by provider] heparin infusion 25,000 units in 500 mL 0.45% NACL  0-30 Units/kg/hr Intravenous Continuous Cosme Traylor M.D.   Stopped at 11/13/23 1212    [Held by provider] heparin injection 4,000 Units  40 Units/kg Intravenous PRN Cosme Traylor M.D.        HEPARIN (PORCINE) IN NACL 74653-3.45 UT/500ML-% IV SOLN             HEPARIN SODIUM (PORCINE) 1000 UNIT/ML INJ SOLN             HEPARIN (PORCINE) IN NACL 87532-5.45 UT/500ML-% IV SOLN             alteplase (Activase) 10 mg in  mL Infusion  1 mg/hr Intravenous Continuous Misha Kirby M.D. 10 mL/hr at 11/13/23 1408 1 mg/hr at 11/13/23 1408    alteplase (Activase) 10 mg in  mL Infusion  1 mg/hr Intravenous Continuous Misha Kirby M.D. 10 mL/hr at 11/13/23 1408 1 mg/hr at 11/13/23 1408    heparin infusion 25,000 Units in 500 mL 0.45% NACL  500 Units/hr Intravenous Continuous Misha Kirby M.D. 10 mL/hr at 11/13/23 1409 500 Units/hr at 11/13/23 1409    NS infusion   Intravenous Continuous Misha Kirby M.D. 30 mL/hr at 11/13/23 1411 Rate Verify at 11/13/23 1411    NS infusion   Intravenous Continuous PAT FitzgeraldDManjinder 35 mL/hr at 11/13/23 1410 Rate Verify at 11/13/23 1410    NS infusion   Intravenous Continuous Misha Kirby M.D. 35 mL/hr at 11/13/23 1409 Rate Verify at 11/13/23 1409    heparin infusion 25,000 units in 500 mL 0.45% NACL  0-30 Units/kg/hr Intravenous Continuous Misha Kirby M.D.        heparin injection 4,000 Units  40 Units/kg Intravenous PRN Misha Kirby M.D.        oxyCODONE CR (OxyCONTIN) tablet 10 mg  10 mg Oral Q12HRS Real Green M.D.   10 mg at 11/13/23 0514    HYDROmorphone (Dilaudid) injection 0.25 mg  0.25 mg  Intravenous Q HOUR PRN Real Green M.D.   0.25 mg at 11/13/23 0626    MD Alert...ICU Electrolyte Replacement per Pharmacy   Other PHARMACY TO DOSE Cosme Traylor M.D.        omeprazole (PriLOSEC) capsule 20 mg  20 mg Oral DAILY Vinnie Matias M.D.   20 mg at 11/13/23 0514    buPROPion SR (Wellbutrin-SR) tablet 150 mg  150 mg Oral QAM Vinnie Matias M.D.   150 mg at 11/13/23 0513    oxyCODONE immediate-release (Roxicodone) tablet 5 mg  5 mg Oral Q3HRS PRN Vinnie Matias M.D.   5 mg at 11/13/23 0110    Or    oxyCODONE immediate release (Roxicodone) tablet 10 mg  10 mg Oral Q3HRS PRN Vinnie Matias M.D.   10 mg at 11/11/23 2137    labetalol (Normodyne/Trandate) injection 10 mg  10 mg Intravenous Q4HRS PRN Vinnie Matias M.D.        ondansetron (Zofran) syringe/vial injection 4 mg  4 mg Intravenous Q4HRS PRN Vinnie Matias M.D.        ondansetron (Zofran ODT) dispertab 4 mg  4 mg Oral Q4HRS PRN Vinnie Matias M.D.        lactated ringers infusion   Intravenous Continuous Vinnie Matias M.D.   Stopped at 11/13/23 1218    hydrOXYzine HCl (Atarax) tablet 25 mg  25 mg Oral TID PRN Vinnie Matias M.D.   25 mg at 11/11/23 2026    piperacillin-tazobactam (Zosyn) 4.5 g in  mL IVPB  4.5 g Intravenous Q8HRS Vinnie Matias M.D. 25 mL/hr at 11/13/23 1438 4.5 g at 11/13/23 1438    norepinephrine (Levophed) 8 mg in 250 mL NS infusion (premix)  0-1 mcg/kg/min (Ideal) Intravenous Continuous Cristino Sosa Jr., D.OManjinder 13.5 mL/hr at 11/13/23 1411 0.09 mcg/kg/min at 11/13/23 1411       Fluids    Intake/Output Summary (Last 24 hours) at 11/13/2023 1506  Last data filed at 11/13/2023 1411  Gross per 24 hour   Intake 3420.16 ml   Output 825 ml   Net 2595.16 ml       Laboratory          Recent Labs     11/11/23  1606 11/12/23  0600 11/13/23  0525   SODIUM 126* 127* 127*   POTASSIUM 4.9 4.8 4.8   CHLORIDE 94* 96 96   CO2 19* 17* 16*   BUN 45* 47* 44*   CREATININE 1.63* 1.49* 1.35   MAGNESIUM 2.4  --  2.1   PHOSPHORUS 4.2  --  4.0    CALCIUM 8.6 7.9* 8.1*     Recent Labs     11/11/23  1409 11/11/23  1606 11/12/23  0600 11/13/23  0525   ALTSGPT 151* 150* 130* 133*   ASTSGOT 127* 110* 90* 101*   ALKPHOSPHAT 768* 788* 709* 676*   TBILIRUBIN 12.1* 12.2* 10.8* 9.8*   LIPASE 26 24  --   --    GLUCOSE 122* 119* 144* 140*     Recent Labs     11/11/23  1606 11/11/23 2038 11/12/23  0600 11/13/23  0525   WBC  --  9.9 10.6 10.8   NEUTSPOLYS  --  76.40* 75.30* 80.30*   LYMPHOCYTES  --  12.60* 12.40* 8.40*   MONOCYTES  --  8.50 9.50 8.50   EOSINOPHILS  --  0.60 0.60 0.40   BASOPHILS  --  0.40 0.70 0.50   ASTSGOT 110*  --  90* 101*   ALTSGPT 150*  --  130* 133*   ALKPHOSPHAT 788*  --  709* 676*   TBILIRUBIN 12.2*  --  10.8* 9.8*     Recent Labs     11/11/23 2038 11/12/23  0600 11/13/23  0525 11/13/23  0736 11/13/23  1030   RBC 4.28* 4.03* 3.96*  --   --    HEMOGLOBIN 13.8* 12.9* 13.0*  --   --    HEMATOCRIT 40.3* 37.9* 36.5*  --   --    PLATELETCT 129* 130* 120*  --   --    PROTHROMBTM  --   --   --  20.7* 21.1*   APTT  --   --   --  29.8 28.7   INR  --   --   --  1.75* 1.79*       Imaging  X-Ray:  I have personally reviewed the images and compared with prior images. and My impression is: Scattered bilateral opacities    Assessment/Plan  * Shock (HCC)- (present on admission)  Assessment & Plan  Differential diagnosis includes obstructive shock with right ventricular failure due to acute pulmonary embolism as well as hypovolemia  I am titrating norepinephrine to keep mean arterial pressure greater than 65    Acute pulmonary embolism with acute cor pulmonale (HCC)  Assessment & Plan  CTA confirms bilateral pulmonary embolism with acute right heart failure  EKOS today in IR  I am titrating heparin based upon serial anti-Xa determinations    Right ventricular failure (HCC)  Assessment & Plan  Echocardiogram with severely dilated RV and RV volume and pressure overload and reduced RV systolic function  Due to pulmonary embolism  RVSP 55 mmHg    Pancreatic  adenocarcinoma (HCC)- (present on admission)  Assessment & Plan  Stage IV adenocarcinoma of the pancreas with evidence of pulmonary metastases and peritoneal carcinomatosis    Acute kidney injury (HCC)- (present on admission)  Assessment & Plan  Monitor renal function and urine output  Avoid nephrotoxins and renal dose medications  Continue IV fluid resuscitation    Biliary obstruction- (present on admission)  Assessment & Plan  Pancreatic carcinoma encasing portal, 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  ERCP with stent placement on hold with acute pulmonary embolism and associated right heart strain    Primary hypertension- (present on admission)  Assessment & Plan  History of  Currently on vasopressor support    DVT (deep venous thrombosis) (HCC)- (present on admission)  Assessment & Plan  History of right lower extremity DVT in August 2015  Lower extremity venous doppler with acute bilateral DVT  I am titrating heparin based upon serial anti-Xa determinations         VTE:  Contraindicated  Ulcer: PPI  Lines: None    I have performed a physical exam and reviewed and updated ROS and Plan today (11/13/2023). In review of yesterday's note (11/12/2023), there are no changes except as documented above.     I have assessed and reassessed his respiratory status, blood pressure, hemodynamics and cardiovascular status with titration of norepinephrine, serial determinations of anticoagulation status with titration of heparin.  He is at increased risk for worsening respiratory and cardiovascular system dysfunction.    Discussed patient condition and risk of morbidity and/or mortality with RN, RT, Pharmacy, Charge nurse / hot rounds, and QA team    The patient remains critically ill.  Critical care time = 105 minutes in directly providing and coordinating critical care and extensive data review.  No time overlap and excludes procedures.    Cosme Traylor MD  Pulmonary and Critical Care  Medicine

## 2023-11-13 NOTE — PROGRESS NOTES
Critical Care Progress Note    Date of admission  11/11/2023    Chief Complaint  68 y.o. male admitted 11/11/2023 with worsening weakness.  He has a history of stage IV adenocarcinoma the pancreas, primary hypertension, GERD and gout.    Hospital Course      11/12-Admit ICU shock (hypovelemic vs septic/distributive), hyperbili from mechanical obstruction d/t panc mass.  Titrating NE, Zosyn, GI to stent tomorrow AM, DNR/DNI    11/13 -    CTA with bilateral pulmonary emboli and lower extremity venous Doppler with DVT.  Echocardiogram confirms acute right heart failure.  EKOS today.  Begin full anticoagulation with heparin.  Hold on biliary stent.  11/14 -    tolerated EKOS well yesterday.  Titrating heparin and norepinephrine.      Interval Problem Update  Reviewed last 24 hour events:      SR  NE  Heparin  97.0  +710 mL in the last 24  +6824 mL since admit      Review of Systems  Review of Systems   Unable to perform ROS: Acuity of condition        Vital Signs for last 24 hours   Temp:  [35.7 °C (96.3 °F)-36.1 °C (97 °F)] 35.9 °C (96.6 °F)  Pulse:  [72-99] 72  Resp:  [17-47] 24  BP: ()/(50-76) 109/66  SpO2:  [88 %-97 %] 97 %    Hemodynamic parameters for last 24 hours  CVP:  [36 MM HG] 36 MM HG    Respiratory Information for the last 24 hours       Physical Exam   Physical Exam  Constitutional:       Appearance: He is not diaphoretic.   HENT:      Head: Normocephalic.      Mouth/Throat:      Pharynx: Oropharynx is clear.   Eyes:      General: Scleral icterus present.      Pupils: Pupils are equal, round, and reactive to light.   Cardiovascular:      Comments: Sinus rhythm  Pulmonary:      Breath sounds: Rales (Few crackles) present. No wheezing.   Abdominal:      General: There is no distension.      Tenderness: There is no abdominal tenderness.   Musculoskeletal:      Right lower leg: Edema present.      Left lower leg: Edema present.   Skin:     General: Skin is warm.   Neurological:      General: No focal  deficit present.      Mental Status: He is oriented to person, place, and time.      Cranial Nerves: No cranial nerve deficit.         Medications  Current Facility-Administered Medications   Medication Dose Route Frequency Provider Last Rate Last Admin    LORazepam (Ativan) injection 0.5 mg  0.5 mg Intravenous Q4HRS PRN Cosme Traylor M.D.   0.5 mg at 11/13/23 2058    heparin infusion 25,000 units in 500 mL 0.45% NACL  0-30 Units/kg/hr Intravenous Continuous Misha Kirby M.D. 32 mL/hr at 11/14/23 0657 16 Units/kg/hr at 11/14/23 0657    heparin injection 4,000 Units  40 Units/kg Intravenous PRN Misha Kirby M.D.   4,000 Units at 11/13/23 2108    oxyCODONE CR (OxyCONTIN) tablet 10 mg  10 mg Oral Q12HRS Real Green M.D.   10 mg at 11/14/23 0602    HYDROmorphone (Dilaudid) injection 0.25 mg  0.25 mg Intravenous Q HOUR PRN Real Green M.D.   0.25 mg at 11/13/23 1828    MD Alert...ICU Electrolyte Replacement per Pharmacy   Other PHARMACY TO DOSE Cosme Traylor M.D.        omeprazole (PriLOSEC) capsule 20 mg  20 mg Oral DAILY Vinnie Matias M.D.   20 mg at 11/14/23 0602    buPROPion SR (Wellbutrin-SR) tablet 150 mg  150 mg Oral QAM Vinnie Matias M.D.   150 mg at 11/14/23 0602    oxyCODONE immediate-release (Roxicodone) tablet 5 mg  5 mg Oral Q3HRS PRN Vinnie Matias M.D.   5 mg at 11/13/23 0110    Or    oxyCODONE immediate release (Roxicodone) tablet 10 mg  10 mg Oral Q3HRS PRN Vinnie Matias M.D.   10 mg at 11/11/23 2137    labetalol (Normodyne/Trandate) injection 10 mg  10 mg Intravenous Q4HRS PRN Vinnie Matias M.D.        ondansetron (Zofran) syringe/vial injection 4 mg  4 mg Intravenous Q4HRS PRN Vinnie Matias M.D.        ondansetron (Zofran ODT) dispertab 4 mg  4 mg Oral Q4HRS PRN Vinnie Matias M.D.        hydrOXYzine HCl (Atarax) tablet 25 mg  25 mg Oral TID PRN Vinnie Matias M.D.   25 mg at 11/11/23 2026    norepinephrine (Levophed) 8 mg in 250 mL NS infusion (premix)  0-1  mcg/kg/min (Ideal) Intravenous Continuous Cristino Sosa Jr., D.O. 15 mL/hr at 11/14/23 0253 0.1 mcg/kg/min at 11/14/23 0253       Fluids    Intake/Output Summary (Last 24 hours) at 11/14/2023 0707  Last data filed at 11/14/2023 0600  Gross per 24 hour   Intake 1835.21 ml   Output 1125 ml   Net 710.21 ml       Laboratory          Recent Labs     11/11/23  1606 11/12/23  0600 11/13/23  0525 11/14/23  0610   SODIUM 126* 127* 127* 130*   POTASSIUM 4.9 4.8 4.8 4.7   CHLORIDE 94* 96 96 100   CO2 19* 17* 16* 16*   BUN 45* 47* 44* 43*   CREATININE 1.63* 1.49* 1.35 1.30   MAGNESIUM 2.4  --  2.1 2.2   PHOSPHORUS 4.2  --  4.0 4.3   CALCIUM 8.6 7.9* 8.1* 7.8*     Recent Labs     11/11/23  1409 11/11/23  1606 11/12/23  0600 11/13/23  0525 11/14/23  0610   ALTSGPT 151* 150* 130* 133* 131*   ASTSGOT 127* 110* 90* 101* 108*   ALKPHOSPHAT 768* 788* 709* 676* 668*   TBILIRUBIN 12.1* 12.2* 10.8* 9.8* 7.8*   LIPASE 26 24  --   --   --    GLUCOSE 122* 119* 144* 140* 135*     Recent Labs     11/12/23  0600 11/13/23  0525 11/13/23  1935 11/14/23  0330 11/14/23  0610   WBC 10.6 10.8 10.8 11.9*  --    NEUTSPOLYS 75.30* 80.30*  --  79.30*  --    LYMPHOCYTES 12.40* 8.40*  --  9.50*  --    MONOCYTES 9.50 8.50  --  8.80  --    EOSINOPHILS 0.60 0.40  --  0.30  --    BASOPHILS 0.70 0.50  --  0.70  --    ASTSGOT 90* 101*  --   --  108*   ALTSGPT 130* 133*  --   --  131*   ALKPHOSPHAT 709* 676*  --   --  668*   TBILIRUBIN 10.8* 9.8*  --   --  7.8*     Recent Labs     11/13/23  0525 11/13/23  0736 11/13/23  1030 11/13/23  1935 11/14/23  0330   RBC 3.96*  --   --  3.90* 3.90*   HEMOGLOBIN 13.0*  --   --  12.6* 12.7*   HEMATOCRIT 36.5*  --   --  37.0* 36.7*   PLATELETCT 120*  --   --  136* 74*   PROTHROMBTM  --  20.7* 21.1* 22.7*  --    APTT  --  29.8 28.7 52.3*  --    INR  --  1.75* 1.79* 1.97*  --        Imaging  CT:    CTA images personally reviewed.  Bilateral pulmonary emboli.    Assessment/Plan  * Shock (HCC)- (present on  admission)  Assessment & Plan  Obstructive shock with right ventricular failure due to acute pulmonary embolism  I am titrating norepinephrine to keep mean arterial pressure greater than 65    Acute pulmonary embolism with acute cor pulmonale (HCC)  Assessment & Plan  CTA confirms bilateral pulmonary embolism with acute right heart failure  S/P EKOS catheter directed thrombolysis on 11/13  I am titrating heparin based upon serial anti-Xa determinations    Right ventricular failure (HCC)  Assessment & Plan  Echocardiogram with severely dilated RV and RV volume and pressure overload and reduced RV systolic function  Due to pulmonary embolism  RVSP 55 mmHg    Pancreatic adenocarcinoma (HCC)- (present on admission)  Assessment & Plan  Stage IV adenocarcinoma of the pancreas with evidence of pulmonary metastases and peritoneal carcinomatosis  Dr. 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  ERCP with stent placement on hold with acute pulmonary embolism and associated right heart strain    Primary hypertension- (present on admission)  Assessment & Plan  History of  Currently on vasopressor support    DVT (deep venous thrombosis) (HCC)- (present on admission)  Assessment & Plan  History of right lower extremity DVT in August 2015  Lower extremity venous doppler with acute bilateral DVT  I am titrating heparin based upon serial anti-Xa determinations         VTE:  Heparin  Ulcer: PPI  Lines: None    I have performed a physical exam and reviewed and updated ROS and Plan today (11/14/2023). In review of yesterday's note (11/13/2023), there are no changes except as documented above.     I have assessed and reassessed  his respiratory status, blood pressure, hemodynamics, cardiovascular status with titration of norepinephrine, serial determinations of anticoagulation status with titration of heparin.  He is at increased risk for worsening respiratory, cardiovascular and hematologic system dysfunction.    Discussed patient condition and risk of morbidity and/or mortality with RN, RT, Pharmacy, Charge nurse / hot rounds, and QA team    The patient remains critically ill.  Critical care time = 35 minutes in directly providing and coordinating critical care and extensive data review.  No time overlap and excludes procedures.    Cosme Traylor MD  Pulmonary and Critical Care Medicine

## 2023-11-13 NOTE — ASSESSMENT & PLAN NOTE
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

## 2023-11-13 NOTE — ASSESSMENT & PLAN NOTE
Hx of Stage IV pancreatic adenocarcinoma. Noted to be encompassing portal, splenic and mesenteric veins, as well as causing CBD dilatation. GI consulted, appreciate support    -Tentative plan for ERCP w/ stent placement, appreciate GI support  -Continue to monitor/trend

## 2023-11-13 NOTE — ASSESSMENT & PLAN NOTE
Initial concerns for hypovolemic vs. Septic/distributive shock. Found to have submassive bilateral PE on 11/13, therefore likely obstructive shock. S/p EKOS catheter directed thrombolysis (11/13)    -Continue pressor support with levophed to maintain MAP >65 mmHg, titrate as appropriate  -Will consider midodrine if persistent pressor requirements  -Continue to monitor hemodynamics

## 2023-11-13 NOTE — ASSESSMENT & PLAN NOTE
ECHO demonstrating RV volume overload and reduced RV functionm RVSP of 55. Likely in setting of Bilateral submassive PE, as seen on CTA Chest. S/p EKOS catheter directed thrombolysis (11/13)    -Continue therapeutic anticoagulation with IV heparin  -Continue to monitor

## 2023-11-13 NOTE — PROGRESS NOTES
Patient returned from IR on EKOS machine, still on levophed drip, and on 5L NC. Patient A/Ox4, responds in all extremities, pulse palpable in the RLE. All EKOS drips verified with another RN. Patient in reverse trendelenburg.

## 2023-11-13 NOTE — PROGRESS NOTES
"Critical Care Progress Note    Date of admission  11/11/2023    Chief Complaint  68 y.o. male admitted 11/11/2023 with worsening weakness. Transferred to ICU on 11/11 for shock    Hospital Course  \"68 y.o. male with a pmhx of HTN, GERD, gout who presented 11/11/2023 with jaundice, poor oral intake and malaise.  The patient and his wife report approximately 1 month diagnosis of a pancreatic mass and lung metastasis.  He underwent a EGD on 10/31/2023 by Dr. Heredia at [facility] for biopsy of this mass and unfortunately the pathology has returned 3 days ago with adenocarcinoma.  Given the pulmonary metastasis this would stage him to stage IV pancreatic adenocarcinoma, they have a follow-up [place] [facility] [place]  Over the past week the patient's wife states he has been progressively weaker mostly staying in bed with the exception of going to the bathroom.  He has had incredibly poor oral intake and they have been trying bone broths and boost to increase his caloric intake.  He presented to the ER where he was found to have a fairly unremarkable CBC, hyponatremia at 124, ADAM with a creatinine of 1.63, BUN 45, AST and ALT elevations at 110 and 150 respectively and an alkaline phosphatase of 788.  In addition to this his total bilirubin was noted at 12.2.  Other than his weakness, malaise and inability to eat he has noted some acid epigastric pain which does radiate to his back.  He denies any bowel or bladder changes, he has had some increasing dyspnea however no chest pain.  Given the abruptness of this symptoms onset and the acuity of his diagnosis he has not had the opportunity to discuss treatment plans or goals of care with his oncology team.     11/12-Admit ICU shock (hypovelemic vs septic/distributive), hyperbili from mechanical obstruction d/t panc mass.  Titrating NE, Zosyn, GI to stent tomorrow AM, DNR/DNI\" - Per Dr. Green's note    Interval Problem Update  Reviewed last " 24 hour events:  Requiring 0.06 levo, LR @ 100 cc/hr. ECHO demonstrating normal EF of 70%, however with RV overload and reduced RV fxn, RVSP 55. U/S BLEs demonstrating bilateral DVT, CTA chest demonstrating extensive bilateral PE's w/ RV strain. IR consulted for emergent thrombectomy, initiated on Heparin bolus/drip. Plan for stent with GI tomorrow.     Review of Systems  Review of Systems   Constitutional:  Negative for chills and fever.   Respiratory:  Negative for cough, shortness of breath and wheezing.    Cardiovascular:  Negative for chest pain and palpitations.   Gastrointestinal:  Negative for abdominal pain, blood in stool, melena, nausea and vomiting.   Genitourinary:  Negative for dysuria and hematuria.   Neurological:  Negative for speech change, focal weakness and headaches.   Psychiatric/Behavioral:  The patient is nervous/anxious.         Vital Signs for last 24 hours   Temp:  [35.9 °C (96.7 °F)-36.7 °C (98.1 °F)] 36.7 °C (98 °F)  Pulse:  [77-90] (P) 90  Resp:  [17-31] (P) 30  BP: ()/(54-63) (P) 98/55  SpO2:  [92 %-96 %] (P) 93 %    Hemodynamic parameters for last 24 hours       Respiratory Information for the last 24 hours       Physical Exam   Physical Exam  Constitutional:       Appearance: He is ill-appearing.   HENT:      Head: Normocephalic and atraumatic.      Mouth/Throat:      Mouth: Mucous membranes are moist.   Eyes:      General: Scleral icterus present.      Extraocular Movements: Extraocular movements intact.      Pupils: Pupils are equal, round, and reactive to light.   Cardiovascular:      Rate and Rhythm: Normal rate and regular rhythm.      Heart sounds: No murmur heard.     No friction rub. No gallop.   Pulmonary:      Effort: Pulmonary effort is normal.      Breath sounds: Normal breath sounds. No wheezing, rhonchi or rales.   Abdominal:      General: Abdomen is flat.      Tenderness: There is no abdominal tenderness. There is no guarding or rebound.   Musculoskeletal:       Right lower leg: No edema.      Left lower leg: No edema.   Skin:     Coloration: Skin is jaundiced.   Neurological:      General: No focal deficit present.      Mental Status: He is alert and oriented to person, place, and time.         Medications  Current Facility-Administered Medications   Medication Dose Route Frequency Provider Last Rate Last Admin    LORazepam (Ativan) injection 0.5 mg  0.5 mg Intravenous Q4HRS PRN Cosme Traylor M.D.        heparin infusion 25,000 units in 500 mL 0.45% NACL  0-30 Units/kg/hr Intravenous Continuous Cosme Traylor M.D.        heparin injection 8,000 Units  80 Units/kg Intravenous Once Cosme Traylor M.D.        heparin injection 4,000 Units  40 Units/kg Intravenous PRN Cosme Traylor M.D.        oxyCODONE CR (OxyCONTIN) tablet 10 mg  10 mg Oral Q12HRS Real Green M.D.   10 mg at 11/13/23 0514    HYDROmorphone (Dilaudid) injection 0.25 mg  0.25 mg Intravenous Q HOUR PRN Real Green M.D.   0.25 mg at 11/13/23 0626    MD Alert...ICU Electrolyte Replacement per Pharmacy   Other PHARMACY TO DOSE Cosme Traylor M.D.        omeprazole (PriLOSEC) capsule 20 mg  20 mg Oral DAILY Vinnie Matias M.D.   20 mg at 11/13/23 0514    buPROPion SR (Wellbutrin-SR) tablet 150 mg  150 mg Oral QAM Vinnie Matias M.D.   150 mg at 11/13/23 0513    oxyCODONE immediate-release (Roxicodone) tablet 5 mg  5 mg Oral Q3HRS PRN Vinnie Matias M.D.   5 mg at 11/13/23 0110    Or    oxyCODONE immediate release (Roxicodone) tablet 10 mg  10 mg Oral Q3HRS PRN Vinnie Matias M.D.   10 mg at 11/11/23 2137    labetalol (Normodyne/Trandate) injection 10 mg  10 mg Intravenous Q4HRS PRN Vinnie Matias M.D.        ondansetron (Zofran) syringe/vial injection 4 mg  4 mg Intravenous Q4HRS PRN Vinnie Matias M.D.        ondansetron (Zofran ODT) dispertab 4 mg  4 mg Oral Q4HRS PRN Vinnie Matias M.D.        lactated ringers infusion   Intravenous Continuous Vinnie Matias M.D.    Stopped at 11/13/23 0739    hydrOXYzine HCl (Atarax) tablet 25 mg  25 mg Oral TID PRN Vninie Matias M.D.   25 mg at 11/11/23 2026    piperacillin-tazobactam (Zosyn) 4.5 g in  mL IVPB  4.5 g Intravenous Q8HRS Vinnie Matias M.D.   Stopped at 11/13/23 0916    norepinephrine (Levophed) 8 mg in 250 mL NS infusion (premix)  0-1 mcg/kg/min (Ideal) Intravenous Continuous Cristino Sosa Jr., D.O. 15 mL/hr at 11/13/23 0805 0.1 mcg/kg/min at 11/13/23 0805       Fluids    Intake/Output Summary (Last 24 hours) at 11/13/2023 1100  Last data filed at 11/13/2023 0745  Gross per 24 hour   Intake 3045.21 ml   Output 1000 ml   Net 2045.21 ml       Laboratory          Recent Labs     11/11/23  1606 11/12/23  0600 11/13/23  0525   SODIUM 126* 127* 127*   POTASSIUM 4.9 4.8 4.8   CHLORIDE 94* 96 96   CO2 19* 17* 16*   BUN 45* 47* 44*   CREATININE 1.63* 1.49* 1.35   MAGNESIUM 2.4  --  2.1   PHOSPHORUS 4.2  --  4.0   CALCIUM 8.6 7.9* 8.1*     Recent Labs     11/11/23  1409 11/11/23  1606 11/12/23  0600 11/13/23  0525   ALTSGPT 151* 150* 130* 133*   ASTSGOT 127* 110* 90* 101*   ALKPHOSPHAT 768* 788* 709* 676*   TBILIRUBIN 12.1* 12.2* 10.8* 9.8*   LIPASE 26 24  --   --    GLUCOSE 122* 119* 144* 140*     Recent Labs     11/11/23  1606 11/11/23  2038 11/12/23  0600 11/13/23  0525   WBC  --  9.9 10.6 10.8   NEUTSPOLYS  --  76.40* 75.30* 80.30*   LYMPHOCYTES  --  12.60* 12.40* 8.40*   MONOCYTES  --  8.50 9.50 8.50   EOSINOPHILS  --  0.60 0.60 0.40   BASOPHILS  --  0.40 0.70 0.50   ASTSGOT 110*  --  90* 101*   ALTSGPT 150*  --  130* 133*   ALKPHOSPHAT 788*  --  709* 676*   TBILIRUBIN 12.2*  --  10.8* 9.8*     Recent Labs     11/11/23 2038 11/12/23  0600 11/13/23  0525 11/13/23  0736   RBC 4.28* 4.03* 3.96*  --    HEMOGLOBIN 13.8* 12.9* 13.0*  --    HEMATOCRIT 40.3* 37.9* 36.5*  --    PLATELETCT 129* 130* 120*  --    PROTHROMBTM  --   --   --  20.7*   APTT  --   --   --  29.8   INR  --   --   --  1.75*       Imaging  CT:    Reviewed  Echo:    Reviewed  Ultrasound:  Reviewed    Assessment/Plan  * Pancreatic adenocarcinoma (HCC)- (present on admission)  Assessment & Plan  Patient has history of Stage IV pancreatic adenocarcinoma  with pulmonary and peritoneal metastases, leading to current biliary obstruction as well as bilater DVTs/PES    -Continue to monitor clinical status  -Palliative care consult pending    Acute pulmonary embolism with acute cor pulmonale (HCC)  Assessment & Plan  ECHO demonstrating normal EF of 70%, however with RV overload and reduced RV fxn, RVSP 55. U/S BLEs demonstrating bilateral DVTs, CTA chest demonstrating extensive bilateral PE's w/ RV strain. Like sequale of Stage IV pancreatic adenocarcinoma. SPESI score of 2, therefore high-risk for mortality    -Telemetry monitoring with continuous pulse oximetry  -IR consulted for emergent thrombectomy  -Initiated on IV heparin bolus/drip     Biliary obstruction- (present on admission)  Assessment & Plan  Hx of Stage IV pancreatic adenocarcinoma. Noted to be encompassing portal, splenic and mesenteric veins, as well as causing CBD dilatation. GI consulted, appreciate support    -Plan for ERCP w/ stent placement on 11/14, appreciate GI support  -Continue to monitor/trend    Acute embolism and thrombosis of unspecified deep veins of unspecified lower extremity (HCC)- (present on admission)  Assessment & Plan  Patient has hx of RLE DVT in 8/2015. U/S BLE demonstrating acute to subacute occlusive RLE thrombus, and chronic partially occlusive LLE thrombus. Likely in setting of metastatic pancreatic adenocarcinoma.    -Initiated on heparin bolus/drip, transition to DOAC following procedures/resolution of ADAM    Right ventricular failure (HCC)  Assessment & Plan  ECHO demonstrating RV volume overload and reduced RV functionm RVSP of 55. Likely in setting of Bilateral submassive PE, as seen on CTA Chest    -Consulted IR for emergent thrombectomy, IV heparin bolus/drip initiated  -Continue to  monitor    Shock (HCC)- (present on admission)  Assessment & Plan  Initial concerns for hypovolemic vs. Septic/distributive shock. Found to have submassive bilateral PE on 11/13, therefore likely obstructive shock.    -Continue pressor support with levophed to maintain MAP >65 mmHg  -Continue LR at 100 cc/hr  -IR consulted for emergent thrombectomy, initiated on Heparin bolus/drip    Acute kidney injury (HCC)- (present on admission)  Assessment & Plan  ADMA on presentation w/ BUN 45 and Cr of 1.63. Likely prerenal in setting of poor p.o. intake and subsequent shock.    -Continue IVF, pressor support  -Strict I/Os   -Avoid nephrotoxic agents  -Renally adjust medications    Primary hypertension- (present on admission)  Assessment & Plan  History of HTN, on lisinopril 20 mg outpatient    -Hold antihypertensives in the setting of shock  -Reinitiate lisinopril when clinically appropriate.          VTE:  Heparin  Ulcer: PPI  Lines: None    I have performed a physical exam and reviewed and updated ROS and Plan today (11/13/2023). In review of yesterday's note (11/12/2023), there are no changes except as documented above.     Discussed patient condition and risk of morbidity and/or mortality with Family, RN, RT, Pharmacy, and Patient

## 2023-11-13 NOTE — ASSESSMENT & PLAN NOTE
ADAM on presentation w/ BUN 45 and Cr of 1.63 (baseline ~1.1-1.3). Likely prerenal in setting of poor p.o. intake and subsequent shock.    -Now resolved  -Strict I/Os   -Avoid nephrotoxic agents  -Renally adjust medications

## 2023-11-13 NOTE — PROGRESS NOTES
Patient coughed up ash red blood just over the size of a quarter into a napkin 10 min after eating crackers. Remains on 1L NC, vitals unchanged, no change in work of breathing. Patient has not received blood thinners.    Dr. Green and Dr. Hamlin notified. No new orders received.

## 2023-11-13 NOTE — ASSESSMENT & PLAN NOTE
Patient has hx of RLE DVT in 8/2015. U/S BLE demonstrating acute to subacute occlusive RLE thrombus, and chronic partially occlusive LLE thrombus. Likely in setting of metastatic pancreatic adenocarcinoma.    -Continue therapeutic anticoagulation with IV heparin  -Transition to Lovenox vs DOAC following tentative ERCP with stent placement

## 2023-11-13 NOTE — PROGRESS NOTES
..Gastroenterology Progress Note               Author:  DOMINIQUE Arnett Date & Time Created: 11/13/2023 7:17 AM       Patient ID:  Name:             Adam Yan  YOB: 1955  Age:                 68 y.o.  male  MRN:               [id number]        Medical Decision Making, by Problem:  Active Hospital Problems    Diagnosis     Biliary obstruction [K83.1]     Pancreatic adenocarcinoma (HCC) [C25.9]     Acute kidney injury (HCC) [N17.9]     Elevated liver enzymes [R74.8]     Shock (HCC) [R57.9]     Goals of care, counseling/discussion [Z71.89]     Acute embolism and thrombosis of unspecified deep veins of unspecified lower extremity (HCC) [I82.409]     Primary hypertension [I10]            Presenting Chief Complaint:  Obstructive jaundice from pancreatic cancer    Interval History:  11/13/2023: Patient seen this morning.  Having increased tachycardia and shortness of breath.  Underwent CTA and found to have numerous pulmonary emboli.  Went to IR and underwent EKOS.      Hospital Medications:  Current Facility-Administered Medications   Medication Dose Frequency Provider Last Rate Last Admin    LORazepam (Ativan) injection 0.5 mg  0.5 mg Q4HRS PRN Cosme Traylor M.D.        oxyCODONE CR (OxyCONTIN) tablet 10 mg  10 mg Q12HRS Real Green M.D.   10 mg at 11/13/23 0514    HYDROmorphone (Dilaudid) injection 0.25 mg  0.25 mg Q HOUR PRN Real Green M.D.   0.25 mg at 11/13/23 0626    MD Alert...ICU Electrolyte Replacement per Pharmacy   PHARMACY TO DOSE Cosme Traylor M.D.        omeprazole (PriLOSEC) capsule 20 mg  20 mg DAILY Vinnie Matias M.D.   20 mg at 11/13/23 0514    buPROPion SR (Wellbutrin-SR) tablet 150 mg  150 mg QAM Vinnie Matias M.D.   150 mg at 11/13/23 0513    oxyCODONE immediate-release (Roxicodone) tablet 5 mg  5 mg Q3HRS PRN Vinnie Matias M.D.   5 mg at 11/13/23 0110    Or    oxyCODONE immediate release (Roxicodone) tablet 10 mg  10 mg Q3HRS PRN  "Vinnie Matias M.D.   10 mg at 11/11/23 2137    labetalol (Normodyne/Trandate) injection 10 mg  10 mg Q4HRS PRN Vinnie Matias M.D.        ondansetron (Zofran) syringe/vial injection 4 mg  4 mg Q4HRS PRN Vinnie Matias M.D.        ondansetron (Zofran ODT) dispertab 4 mg  4 mg Q4HRS PRN Vinnie Matias M.D.        lactated ringers infusion   Continuous Vinnie Matias M.D. 100 mL/hr at 11/13/23 0529 Rate Verify at 11/13/23 0529    hydrOXYzine HCl (Atarax) tablet 25 mg  25 mg TID PRN Vinnie Matias M.D.   25 mg at 11/11/23 2026    piperacillin-tazobactam (Zosyn) 4.5 g in  mL IVPB  4.5 g Q8HRS Vinnie Matias M.D. 25 mL/hr at 11/13/23 0529 Rate Verify at 11/13/23 0529    norepinephrine (Levophed) 8 mg in 250 mL NS infusion (premix)  0-1 mcg/kg/min (Ideal) Continuous Cristino Sosa Jr., D.O. 15 mL/hr at 11/13/23 0529 0.1 mcg/kg/min at 11/13/23 0529   Last reviewed on 11/11/2023  7:14 PM by Alayna Hernandez, T       Review of Systems:  Review of Systems   Constitutional:  Positive for malaise/fatigue. Negative for chills and fever.   HENT:  Negative for hearing loss.    Eyes:  Negative for blurred vision.   Respiratory:  Positive for shortness of breath. Negative for cough.    Cardiovascular:  Negative for chest pain and leg swelling.   Gastrointestinal:  Positive for abdominal pain and constipation. Negative for blood in stool, diarrhea, heartburn, melena, nausea and vomiting.   Genitourinary:  Negative for dysuria.   Musculoskeletal:  Positive for back pain.   Skin:  Negative for rash.   Neurological:  Positive for weakness. Negative for dizziness.   Psychiatric/Behavioral:  Negative for depression.    All other systems reviewed and are negative.        Vital signs:  Weight/BMI: Body mass index is 29.09 kg/m².  BP 95/58   Pulse 83   Temp 36.7 °C (98 °F) (Temporal)   Resp (!) 24   Ht 1.854 m (6' 1\")   Wt 100 kg (220 lb 7.4 oz)   SpO2 94%   Vitals:    11/13/23 0500 11/13/23 0514 11/13/23 0614 11/13/23 0626   BP:     "   Pulse:       Resp:  (!) 24 (!) 23 (!) 24   Temp:       TempSrc:       SpO2:       Weight: 100 kg (220 lb 7.4 oz)      Height:         Oxygen Therapy:  Pulse Oximetry: 94 %, O2 (LPM): 1, O2 Delivery Device: Silicone Nasal Cannula    Intake/Output Summary (Last 24 hours) at 11/13/2023 0717  Last data filed at 11/13/2023 0529  Gross per 24 hour   Intake 3486.09 ml   Output 1125 ml   Net 2361.09 ml         Physical Exam:  Physical Exam  Vitals and nursing note reviewed.   Constitutional:       General: He is not in acute distress.     Appearance: He is ill-appearing.   HENT:      Head: Normocephalic and atraumatic.      Right Ear: External ear normal.      Left Ear: External ear normal.      Nose: Nose normal.      Mouth/Throat:      Mouth: Mucous membranes are moist.      Pharynx: Oropharynx is clear.   Eyes:      General: Scleral icterus present.   Cardiovascular:      Rate and Rhythm: Regular rhythm. Tachycardia present.      Pulses: Normal pulses.      Heart sounds: Normal heart sounds.   Pulmonary:      Effort: Pulmonary effort is normal. No respiratory distress.      Breath sounds: Normal breath sounds.   Abdominal:      General: Abdomen is flat. Bowel sounds are normal. There is distension.      Palpations: Abdomen is soft.   Musculoskeletal:         General: Normal range of motion.      Cervical back: Normal range of motion and neck supple.   Skin:     General: Skin is warm.      Capillary Refill: Capillary refill takes less than 2 seconds.      Coloration: Skin is jaundiced.   Neurological:      Mental Status: He is alert and oriented to person, place, and time.      Motor: Weakness present.   Psychiatric:         Mood and Affect: Mood normal.         Behavior: Behavior normal.             Labs:  Recent Labs     11/11/23  1606 11/12/23  0600 11/13/23  0525   SODIUM 126* 127* 127*   POTASSIUM 4.9 4.8 4.8   CHLORIDE 94* 96 96   CO2 19* 17* 16*   BUN 45* 47* 44*   CREATININE 1.63* 1.49* 1.35   MAGNESIUM 2.4  --   2.1   PHOSPHORUS 4.2  --  4.0   CALCIUM 8.6 7.9* 8.1*     Recent Labs     11/11/23  1409 11/11/23  1606 11/12/23  0600 11/13/23  0525   ALTSGPT 151* 150* 130* 133*   ASTSGOT 127* 110* 90* 101*   ALKPHOSPHAT 768* 788* 709* 676*   TBILIRUBIN 12.1* 12.2* 10.8* 9.8*   LIPASE 26 24  --   --    GLUCOSE 122* 119* 144* 140*     Recent Labs     11/11/23  1606 11/11/23 2038 11/12/23  0600 11/13/23  0525   WBC  --  9.9 10.6 10.8   NEUTSPOLYS  --  76.40* 75.30* 80.30*   LYMPHOCYTES  --  12.60* 12.40* 8.40*   MONOCYTES  --  8.50 9.50 8.50   EOSINOPHILS  --  0.60 0.60 0.40   BASOPHILS  --  0.40 0.70 0.50   ASTSGOT 110*  --  90* 101*   ALTSGPT 150*  --  130* 133*   ALKPHOSPHAT 788*  --  709* 676*   TBILIRUBIN 12.2*  --  10.8* 9.8*     Recent Labs     11/11/23 2038 11/12/23  0600 11/13/23  0525   RBC 4.28* 4.03* 3.96*   HEMOGLOBIN 13.8* 12.9* 13.0*   HEMATOCRIT 40.3* 37.9* 36.5*   PLATELETCT 129* 130* 120*     Recent Results (from the past 24 hour(s))   EC-ECHOCARDIOGRAM COMPLETE W/ CONT    Collection Time: 11/12/23  2:19 PM   Result Value Ref Range    Eject.Frac. MOD 4C 64.54     Eject.Frac. MOD 2C 66.21     Left Ventrical Ejection Fraction 70    CBC WITH DIFFERENTIAL    Collection Time: 11/13/23  5:25 AM   Result Value Ref Range    WBC 10.8 4.8 - 10.8 K/uL    RBC 3.96 (L) 4.70 - 6.10 M/uL    Hemoglobin 13.0 (L) 14.0 - 18.0 g/dL    Hematocrit 36.5 (L) 42.0 - 52.0 %    MCV 92.2 81.4 - 97.8 fL    MCH 32.8 27.0 - 33.0 pg    MCHC 35.6 32.3 - 36.5 g/dL    RDW 50.0 35.9 - 50.0 fL    Platelet Count 120 (L) 164 - 446 K/uL    MPV 9.6 9.0 - 12.9 fL    Neutrophils-Polys 80.30 (H) 44.00 - 72.00 %    Lymphocytes 8.40 (L) 22.00 - 41.00 %    Monocytes 8.50 0.00 - 13.40 %    Eosinophils 0.40 0.00 - 6.90 %    Basophils 0.50 0.00 - 1.80 %    Immature Granulocytes 1.90 (H) 0.00 - 0.90 %    Nucleated RBC 0.00 0.00 - 0.20 /100 WBC    Neutrophils (Absolute) 8.65 (H) 1.82 - 7.42 K/uL    Lymphs (Absolute) 0.90 (L) 1.00 - 4.80 K/uL    Monos (Absolute)  0.91 (H) 0.00 - 0.85 K/uL    Eos (Absolute) 0.04 0.00 - 0.51 K/uL    Baso (Absolute) 0.05 0.00 - 0.12 K/uL    Immature Granulocytes (abs) 0.20 (H) 0.00 - 0.11 K/uL    NRBC (Absolute) 0.00 K/uL   Comp Metabolic Panel    Collection Time: 11/13/23  5:25 AM   Result Value Ref Range    Sodium 127 (L) 135 - 145 mmol/L    Potassium 4.8 3.6 - 5.5 mmol/L    Chloride 96 96 - 112 mmol/L    Co2 16 (L) 20 - 33 mmol/L    Anion Gap 15.0 7.0 - 16.0    Glucose 140 (H) 65 - 99 mg/dL    Bun 44 (H) 8 - 22 mg/dL    Creatinine 1.35 0.50 - 1.40 mg/dL    Calcium 8.1 (L) 8.5 - 10.5 mg/dL    Correct Calcium 9.3 8.5 - 10.5 mg/dL    AST(SGOT) 101 (H) 12 - 45 U/L    ALT(SGPT) 133 (H) 2 - 50 U/L    Alkaline Phosphatase 676 (H) 30 - 99 U/L    Total Bilirubin 9.8 (H) 0.1 - 1.5 mg/dL    Albumin 2.5 (L) 3.2 - 4.9 g/dL    Total Protein 5.5 (L) 6.0 - 8.2 g/dL    Globulin 3.0 1.9 - 3.5 g/dL    A-G Ratio 0.8 g/dL   MAGNESIUM    Collection Time: 11/13/23  5:25 AM   Result Value Ref Range    Magnesium 2.1 1.5 - 2.5 mg/dL   PHOSPHORUS    Collection Time: 11/13/23  5:25 AM   Result Value Ref Range    Phosphorus 4.0 2.5 - 4.5 mg/dL   Lipid Profile (Lipid Panel) Fasting    Collection Time: 11/13/23  5:25 AM   Result Value Ref Range    Cholesterol,Tot 258 (H) 100 - 199 mg/dL    Triglycerides 238 (H) 0 - 149 mg/dL    HDL 15 (A) >=40 mg/dL     (H) <100 mg/dL   TSH WITH REFLEX TO FT4    Collection Time: 11/13/23  5:25 AM   Result Value Ref Range    TSH 3.000 0.380 - 5.330 uIU/mL   CORTISOL    Collection Time: 11/13/23  5:25 AM   Result Value Ref Range    Cortisol 19.9 0.0 - 23.0 ug/dL   ESTIMATED GFR    Collection Time: 11/13/23  5:25 AM   Result Value Ref Range    GFR (CKD-EPI) 57 (A) >60 mL/min/1.73 m 2       Radiology Review:  EC-ECHOCARDIOGRAM COMPLETE W/ CONT   Final Result      CT-ABDOMEN-PELVIS WITH   Final Result         1. 3 cm ill-defined pancreatic mass, which could represent pancreatic adenocarcinoma or cholangiocarcinoma. There is encasement  of the adjacent portal, splenic, and mesenteric veins. There is common bile duct dilation above the level of the lesion.   2. There are several peritoneal nodules, largest measuring 4.1 cm. There is also ascites. Therefore, consistent with peritoneal carcinomatosis.   3. Innumerable lower lung zone nodules, which could represent metastatic disease or a benign process.   4. Liver appears cirrhotic.   5. Density within the gallbladder, as above.   6. Simple small right renal cyst, which does not require follow-up.      DX-CHEST-PORTABLE (1 VIEW)   Final Result      Hypoinflation with prominent interstitial and numerous small ill-defined pulmonary nodules bilaterally may indicate granulomatous disease, pneumonitis or neoplastic process.      US-EXTREMITY VENOUS LOWER BILAT    (Results Pending)   CT-CTA CHEST PULMONARY ARTERY W/ RECONS    (Results Pending)         MDM (Data Review):   -Records reviewed and summarized in current documentation  -I personally reviewed and interpreted the laboratory results  -I personally reviewed the radiology images    Assessment/Recommendations:  IMPRESSION/PLAN:  Pancreatic adenocarcinoma - metastatic to lung, ascites present   Obstructive jaundice due to pancreatitic mass  Cholangitis - on antibiotics   Pulmonary emboli - status post EKOS     Plan:  Needs ERCP with biliary stent placement  Diet per primary team  Will reassess daily to determine timing of endoscopic intervention    Discussed with patient and his family, Dr. Scott, Dr. Pena, Dr. Traylor    Core Quality Measures   Reviewed items::  Labs, Medications and Radiology reports reviewed

## 2023-11-13 NOTE — ASSESSMENT & PLAN NOTE
ECHO demonstrating normal EF of 70%, however with RV overload and reduced RV fxn, RVSP 55. U/S BLEs demonstrating bilateral DVTs, CTA chest demonstrating extensive bilateral PE's w/ RV strain. Like sequale of Stage IV pancreatic adenocarcinoma. SPESI score of 2, therefore high-risk for mortality.  S/p EKOS catheter directed thrombolysis (11/13), appreciate IR support.    -Telemetry monitoring with continuous pulse oximetry  -Continue therapeutic anticoagulation with IV heparin, transition to Lovenox vs DOAC when clinically appropriate

## 2023-11-13 NOTE — PROGRESS NOTES
Patient transported to CT main for CTA with this RN and NAP, patient on 3L NC, on levophed and on monitor. Patient returned to unit and is resting in bed with no complaints or signs of distress.

## 2023-11-13 NOTE — PROGRESS NOTES
Pt presents to OR 1. Pt was consented by MD at bedside, confirmed by this RN and consent signed at bedside. Patient underwent a pulmonary angiogram with EKOS placement by Dr. Kirby. Site marked and initialed by MD prior to procedure starting and verified by patient and procedure team. Patient was placed in a supine position. R femoral vein was accessed. Vitals were taken every 5 minutes and remained stable during procedure (see doc flow sheet for results). CO2 waveform capnography was monitored and remained WNL throughout procedure. Report called to MELANIE Omalley. Pt transported via gurney with RN to T621 in a stable condition.

## 2023-11-13 NOTE — HOSPITAL COURSE
"\"68 y.o. male with a pmhx of HTN, GERD, gout who presented 11/11/2023 with jaundice, poor oral intake and malaise.  The patient and his wife report approximately 1 month diagnosis of a pancreatic mass and lung metastasis.  He underwent a EGD on 10/31/2023 by Dr. Heredia at [facility] for biopsy of this mass and unfortunately the pathology has returned 3 days ago with adenocarcinoma.  Given the pulmonary metastasis this would stage him to stage IV pancreatic adenocarcinoma, they have a follow-up appointment with his oncologist Dr. Hatch at [facility] next week.  Over the past week the patient's wife states he has been progressively weaker mostly staying in bed with the exception of going to the bathroom.  He has had incredibly poor oral intake and they have been trying bone broths and boost to increase his caloric intake.  He presented to the ER where he was found to have a fairly unremarkable CBC, hyponatremia at 124, ADAM with a creatinine of 1.63, BUN 45, AST and ALT elevations at 110 and 150 respectively and an alkaline phosphatase of 788.  In addition to this his total bilirubin was noted at 12.2.  Other than his weakness, malaise and inability to eat he has noted some acid epigastric pain which does radiate to his back.  He denies any bowel or bladder changes, he has had some increasing dyspnea however no chest pain.  Given the abruptness of this symptoms onset and the acuity of his diagnosis he has not had the opportunity to discuss treatment plans or goals of care with his oncology team.     11/12 - Admit ICU shock (hypovelemic vs septic/distributive), hyperbili from mechanical obstruction d/t panc mass.  Titrating NE, Zosyn, GI to stent tomorrow AM, DNR/DNI\" - Per Dr. Green's note    11/13 - Requiring 0.06 levo, LR @ 100 cc/hr. ECHO demonstrating normal EF of 70%, however with RV overload and reduced RV fxn, RVSP 55. U/S BLEs demonstrating bilateral DVT, CTA chest demonstrating extensive bilateral PE's w/ RV " strain. IR consulted, initiated on Heparin bolus/drip. S/p EKOS catheter placement with catheter-directed thrombolysis.    11/14 - EKOS catheters removed 11/13 evening, on IV heparin. Significant subjective improvement, requiring 0.06 levo. On 3 L NC supplementation. Currently on therapeutic anticoagulation with IV heparin. Oncology consulted, appreciate support. IV Zosyn discontinued.

## 2023-11-13 NOTE — CONSULTS
MRN: [id number]  Date of palliative consult: November 11  Reason for consult: Advance care planning  Referring provider: Florencio  Location of consult: [place] [address]  Additional consulting services: Gastroenterology, critical care.    HPI:   Adam Yan is a 68 y.o. male with past medical history of pancreatic adenocarcinoma stage IV, hypertension who presented to the hospital for generalized weakness, fatigue, and jaundice for the past week.  Also endorses some shortness of breath on exertion.  Recently diagnosed with pancreatic carcinoma on October 31 after ERCP.  At that time he had a dilated common bile duct up to 10 mm.  He denies nausea, vomiting, diarrhea, fever, cough.  He reports no food x1 week with significant weight loss.  Chest x-ray reveals bilateral pulmonary edema and bilateral metastatic lesions.    Reportedly, patient has follow-up appointment with Dr. Hatch in [facility].    Patient was admitted to the ICU for hypovolemic versus septic/distributive shock, hyperbilirubinemia from mechanical obstruction due to pancreatic mass.    Significant/pertinent past medical history: Never smoker, does not drink or use drugs.    Pain History:  Onset: With recent diagnosis  Location: Bandlike across abdomen into his back  Duration: Until he has pain medications  Characteristics: Bandlike  sharp  Aggravating factors: Not addressed  Alleviating factors: Not addressed  Radiation: To the back  Treatments: Position, medication  Severity: 10/10 at its worst.    Additional symptoms: Anorexia, weakness, constipation, insomnia, dyspnea on any exertion, anxiety. e    Interval History: To have biliary stent placement on November 13, remains on Levophed.  Has CTA to rule out pulmonary embolism and lower extremity venous Doppler to rule out DVT.    Medication Allergy/Sensitivities:  No Known Allergies      ROS:    Review of Systems   Constitutional:  Positive for malaise/fatigue and weight loss.   Respiratory:  Positive  for shortness of breath.    Cardiovascular:  Negative for chest pain, palpitations and orthopnea.   Gastrointestinal:  Positive for abdominal pain and constipation.   Musculoskeletal:  Positive for back pain.   Neurological:  Positive for weakness.   Psychiatric/Behavioral:  The patient is nervous/anxious and has insomnia.        PE:   Recent vital signs  BMI: Body mass index is 29.09 kg/m².    Temp (24hrs), Av.4 °C (97.6 °F), Min:35.9 °C (96.7 °F), Max:36.7 °C (98.1 °F)  Temperature: 36.7 °C (98 °F)  Pulse  Av.1  Min: 70  Max: 96   Blood Pressure : 95/58       Physical Exam  Vitals and nursing note reviewed.   Constitutional:       General: He is not in acute distress.     Appearance: He is normal weight. He is ill-appearing.      Interventions: Nasal cannula in place.   HENT:      Head: Normocephalic.      Mouth/Throat:      Mouth: Mucous membranes are dry.   Eyes:      General: Scleral icterus present.   Cardiovascular:      Rate and Rhythm: Normal rate.   Pulmonary:      Effort: Tachypnea present.      Breath sounds: Decreased air movement present. Examination of the right-lower field reveals decreased breath sounds. Examination of the left-lower field reveals decreased breath sounds. Decreased breath sounds present.   Abdominal:      General: Bowel sounds are normal. There is distension.      Palpations: Abdomen is soft.      Tenderness: There is no abdominal tenderness.   Musculoskeletal:      Right lower le+ Edema present.      Left lower le+ Edema present.   Skin:     General: Skin is warm and dry.      Capillary Refill: Capillary refill takes less than 2 seconds.      Coloration: Skin is jaundiced.   Neurological:      Mental Status: He is alert and oriented to person, place, and time.   Psychiatric:         Attention and Perception: Attention normal.         Mood and Affect: Mood is anxious and depressed.         Speech: Speech normal.         Behavior: Behavior normal. Behavior is  cooperative.         Thought Content: Thought content normal.         Cognition and Memory: Cognition normal.         Judgment: Judgment normal.       Recent Labs     11/11/23  1606 11/12/23  0600 11/13/23  0525   SODIUM 126* 127* 127*   POTASSIUM 4.9 4.8 4.8   CHLORIDE 94* 96 96   CO2 19* 17* 16*   GLUCOSE 119* 144* 140*   BUN 45* 47* 44*   CREATININE 1.63* 1.49* 1.35   CALCIUM 8.6 7.9* 8.1*     Recent Labs     11/11/23  2038 11/12/23  0600 11/13/23  0525   WBC 9.9 10.6 10.8   RBC 4.28* 4.03* 3.96*   HEMOGLOBIN 13.8* 12.9* 13.0*   HEMATOCRIT 40.3* 37.9* 36.5*   MCV 94.2 94.0 92.2   MCH 32.2 32.0 32.8   MCHC 34.2 34.0 35.6   RDW 51.1* 50.8* 50.0   PLATELETCT 129* 130* 120*   MPV 9.7 9.7 9.6       ASSESSMENT/PLAN WITH SHARED DECISION MAKING:   Review  Pertinent imaging reviewed.  Echocardiogram performed on November 12 indicates hyperdynamic left ventricular systolic function with ejection fraction estimated at 70%; RV volume pressure overload; severely dilated right ventricle with reduced right ventricular systolic function with akinetic right ventricular free wall; right heart pressures are consistent with moderate pulmonary hypertension of 55 mm Hg.  CTA performed 11/13 a.m. indicates extensive bilateral pulmonary emboli involving the main pulmonary arteries, segmental, and subsegmental branches with RV strain; diffuse metastatic disease throughout lung fields; confluent masslike infiltrates in the periphery of the lung fields bilaterally consistent with metastatic disease; mild to moderate bilateral pleural effusions.    PHYSICAL ASPECTS OF CARE  Palliative Performance Scale: 40%    #Pancreatic adenocarcinoma, stage IV  #Right ventricular failure  #Shock  #Biliary obstruction  #Acute kidney injury   #Pulmonary emboli  #Pulmonary arterial hypertension  #Pain related to cancer  -Reports Dilaudid is working well for pain relief.  - Would recommend celiac plexus block when patient more stable prior to discharge  "home.  #Insomnia  - Melatonin nightly  #Anxiety/depression  - Patient reports he has been on Wellbutrin for \"years\", unclear if this is helpful for anxiety and depression management given diagnosis.  - Would continue as needed Ativan, consider addition of SSRI if depression and anxiety continue or worsen.    SOCIAL ASPECTS OF CARE  Adam resides with his wife, Marilyn of 34 years in [facility].  He is in the [employer] business and up until this diagnosis was working full-time.  He has 3 children together,  Stella, a daughter who resides in [place], Taj a son who resides in [place], and Hernán a son who resides in [place].  They have 1 granddaughter.  Previous to recent illnesses patient was independent in all ADLs.    SPIRITUAL ASPECTS OF CARE   Not addressed during this encounter    GOALS OF CARE/SERIOUS ILLNESS CONVERSATION  Met with patient and family at bedside.  Introduced myself and role of palliative care they are agreeable to consultation.  Adam is recently diagnosed with stage IV pancreatic cancer, path report became aware to them on November 8.  They were to see the oncologist this week.  Adam reports that over the last several weeks he has been weak with anorexia and was even bedbound.  Marilyn reports patient has just been eating crackers and utilizing milk of magnesia and Colace to relieve constipation symptoms secondary to poor appetite.  In exploring what is important to Adam and his family he states \"I would like to feel little better and go home\".  He worries about dying as he will \"miss his family\".  He is tearful as is the rest of his family.  He expresses some distress over increasing symptoms of anxiety and possible depression.  We discussed processing of such difficult information in such a short period of time.  He has recently been prescribed Ativan and I have encouraged him to  utilize this  medication as well as pain medication to relieve his symptoms.  Adam states that he would want to " die at home amongst his family.  We have agreed to discuss future care options as his clinical picture continues to unfold.  Overview of current CODE STATUS DNR/DNI.  Patient and family verbalized understanding of current CODE STATUS and are in agreement after discussion with Dr. rGeen.  Will complete POLST in future next visits.  Discussed with bedside nurse and primary intensivist.  Recommendations per plan of care, palliative care to continue to follow along for ongoing goals of care discussion/future care options related to stage IV pancreatic cancer.  Will complete POLST.    Code Status: DNR/DNI    ACP Documents: Obtained from wife to be scanned into epic wife is healthcare agent.    18 minutes spent discussing advance care planning, this time excludes any other billed services.    I spent a total of 63 minutes reviewing medical records, direct face-to-face time with the patient and/or family, documentation and coordination of care. This is separate from the time spent on advance care planning, which is documented above.    Elvia Schmitz, MSN, APRN, ACNPC-AG.  Palliative Care Nurse Practitioner  [phone]

## 2023-11-13 NOTE — PROGRESS NOTES
Patient transported to IR 1 with RN and NAP. Patient transported on Levo & Heparin. Patient on 3 liters nasal canula. Report given to IR RN. Family with Patient in IR.

## 2023-11-13 NOTE — ASSESSMENT & PLAN NOTE
History of HTN, on lisinopril 20 mg outpatient    -Hold antihypertensives in the setting of shock  -Reinitiate lisinopril when clinically appropriate.

## 2023-11-13 NOTE — ASSESSMENT & PLAN NOTE
Echocardiogram with severely dilated RV and RV volume and pressure overload and reduced RV systolic function  Due to pulmonary embolism  RVSP 55 mmHg

## 2023-11-13 NOTE — ASSESSMENT & PLAN NOTE
CTA confirms bilateral pulmonary embolism with acute right heart failure  S/P EKOS catheter directed thrombolysis on 11/13  I am titrating heparin based upon serial anti-Xa determinations

## 2023-11-13 NOTE — PROGRESS NOTES
Maryanne LAKHANI at bedside to update patient and family. Plan is for biliary stent at 3pm tomorrow.

## 2023-11-14 LAB
ALBUMIN SERPL BCP-MCNC: 2.4 G/DL (ref 3.2–4.9)
ALBUMIN/GLOB SERPL: 0.9 G/DL
ALP SERPL-CCNC: 668 U/L (ref 30–99)
ALT SERPL-CCNC: 131 U/L (ref 2–50)
ANION GAP SERPL CALC-SCNC: 14 MMOL/L (ref 7–16)
AST SERPL-CCNC: 108 U/L (ref 12–45)
BASOPHILS # BLD AUTO: 0.7 % (ref 0–1.8)
BASOPHILS # BLD: 0.08 K/UL (ref 0–0.12)
BILIRUB SERPL-MCNC: 7.8 MG/DL (ref 0.1–1.5)
BUN SERPL-MCNC: 43 MG/DL (ref 8–22)
CALCIUM ALBUM COR SERPL-MCNC: 9.1 MG/DL (ref 8.5–10.5)
CALCIUM SERPL-MCNC: 7.8 MG/DL (ref 8.5–10.5)
CHLORIDE SERPL-SCNC: 100 MMOL/L (ref 96–112)
CO2 SERPL-SCNC: 16 MMOL/L (ref 20–33)
CREAT SERPL-MCNC: 1.3 MG/DL (ref 0.5–1.4)
EOSINOPHIL # BLD AUTO: 0.04 K/UL (ref 0–0.51)
EOSINOPHIL NFR BLD: 0.3 % (ref 0–6.9)
ERYTHROCYTE [DISTWIDTH] IN BLOOD BY AUTOMATED COUNT: 53.8 FL (ref 35.9–50)
GFR SERPLBLD CREATININE-BSD FMLA CKD-EPI: 60 ML/MIN/1.73 M 2
GLOBULIN SER CALC-MCNC: 2.8 G/DL (ref 1.9–3.5)
GLUCOSE SERPL-MCNC: 135 MG/DL (ref 65–99)
HCT VFR BLD AUTO: 36.7 % (ref 42–52)
HGB BLD-MCNC: 12.7 G/DL (ref 14–18)
IMM GRANULOCYTES # BLD AUTO: 0.16 K/UL (ref 0–0.11)
IMM GRANULOCYTES NFR BLD AUTO: 1.4 % (ref 0–0.9)
LYMPHOCYTES # BLD AUTO: 1.13 K/UL (ref 1–4.8)
LYMPHOCYTES NFR BLD: 9.5 % (ref 22–41)
MAGNESIUM SERPL-MCNC: 2.2 MG/DL (ref 1.5–2.5)
MCH RBC QN AUTO: 32.6 PG (ref 27–33)
MCHC RBC AUTO-ENTMCNC: 34.6 G/DL (ref 32.3–36.5)
MCV RBC AUTO: 94.1 FL (ref 81.4–97.8)
MONOCYTES # BLD AUTO: 1.04 K/UL (ref 0–0.85)
MONOCYTES NFR BLD AUTO: 8.8 % (ref 0–13.4)
NEUTROPHILS # BLD AUTO: 9.4 K/UL (ref 1.82–7.42)
NEUTROPHILS NFR BLD: 79.3 % (ref 44–72)
NRBC # BLD AUTO: 0 K/UL
NRBC BLD-RTO: 0 /100 WBC (ref 0–0.2)
PHOSPHATE SERPL-MCNC: 4.3 MG/DL (ref 2.5–4.5)
PLATELET # BLD AUTO: 74 K/UL (ref 164–446)
PLATELETS.RETICULATED NFR BLD AUTO: 2.6 % (ref 0.6–13.1)
PMV BLD AUTO: 11.3 FL (ref 9–12.9)
POTASSIUM SERPL-SCNC: 4.7 MMOL/L (ref 3.6–5.5)
PROT SERPL-MCNC: 5.2 G/DL (ref 6–8.2)
RBC # BLD AUTO: 3.9 M/UL (ref 4.7–6.1)
SODIUM SERPL-SCNC: 130 MMOL/L (ref 135–145)
UFH PPP CHRO-ACNC: 0.72 IU/ML
UFH PPP CHRO-ACNC: 0.73 IU/ML
UFH PPP CHRO-ACNC: 0.93 IU/ML
WBC # BLD AUTO: 11.9 K/UL (ref 4.8–10.8)

## 2023-11-14 PROCEDURE — 700111 HCHG RX REV CODE 636 W/ 250 OVERRIDE (IP): Performed by: RADIOLOGY

## 2023-11-14 PROCEDURE — 700105 HCHG RX REV CODE 258: Performed by: INTERNAL MEDICINE

## 2023-11-14 PROCEDURE — 700111 HCHG RX REV CODE 636 W/ 250 OVERRIDE (IP): Performed by: INTERNAL MEDICINE

## 2023-11-14 PROCEDURE — 99232 SBSQ HOSP IP/OBS MODERATE 35: CPT | Performed by: NURSE PRACTITIONER

## 2023-11-14 PROCEDURE — 99291 CRITICAL CARE FIRST HOUR: CPT | Performed by: INTERNAL MEDICINE

## 2023-11-14 PROCEDURE — 83735 ASSAY OF MAGNESIUM: CPT

## 2023-11-14 PROCEDURE — 700105 HCHG RX REV CODE 258: Performed by: HOSPITALIST

## 2023-11-14 PROCEDURE — 85055 RETICULATED PLATELET ASSAY: CPT

## 2023-11-14 PROCEDURE — 85520 HEPARIN ASSAY: CPT

## 2023-11-14 PROCEDURE — 85025 COMPLETE CBC W/AUTO DIFF WBC: CPT

## 2023-11-14 PROCEDURE — A9270 NON-COVERED ITEM OR SERVICE: HCPCS | Performed by: HOSPITALIST

## 2023-11-14 PROCEDURE — A9270 NON-COVERED ITEM OR SERVICE: HCPCS | Performed by: EMERGENCY MEDICINE

## 2023-11-14 PROCEDURE — 80053 COMPREHEN METABOLIC PANEL: CPT

## 2023-11-14 PROCEDURE — 97163 PT EVAL HIGH COMPLEX 45 MIN: CPT

## 2023-11-14 PROCEDURE — 700111 HCHG RX REV CODE 636 W/ 250 OVERRIDE (IP): Mod: JZ | Performed by: HOSPITALIST

## 2023-11-14 PROCEDURE — 700102 HCHG RX REV CODE 250 W/ 637 OVERRIDE(OP): Performed by: EMERGENCY MEDICINE

## 2023-11-14 PROCEDURE — 770022 HCHG ROOM/CARE - ICU (200)

## 2023-11-14 PROCEDURE — 84100 ASSAY OF PHOSPHORUS: CPT

## 2023-11-14 PROCEDURE — 700101 HCHG RX REV CODE 250: Performed by: INTERNAL MEDICINE

## 2023-11-14 PROCEDURE — 700102 HCHG RX REV CODE 250 W/ 637 OVERRIDE(OP): Performed by: HOSPITALIST

## 2023-11-14 RX ADMIN — BUPROPION HYDROCHLORIDE 150 MG: 150 TABLET, EXTENDED RELEASE ORAL at 06:02

## 2023-11-14 RX ADMIN — OXYCODONE 5 MG: 5 TABLET ORAL at 22:24

## 2023-11-14 RX ADMIN — OXYCODONE HYDROCHLORIDE 10 MG: 10 TABLET, FILM COATED, EXTENDED RELEASE ORAL at 06:02

## 2023-11-14 RX ADMIN — LORAZEPAM 0.5 MG: 2 INJECTION INTRAMUSCULAR; INTRAVENOUS at 17:52

## 2023-11-14 RX ADMIN — NOREPINEPHRINE BITARTRATE 0.1 MCG/KG/MIN: 1 INJECTION, SOLUTION, CONCENTRATE INTRAVENOUS at 02:53

## 2023-11-14 RX ADMIN — PIPERACILLIN AND TAZOBACTAM 4.5 G: 4; .5 INJECTION, POWDER, FOR SOLUTION INTRAVENOUS at 06:08

## 2023-11-14 RX ADMIN — OMEPRAZOLE 20 MG: 20 CAPSULE, DELAYED RELEASE ORAL at 06:02

## 2023-11-14 RX ADMIN — HEPARIN SODIUM 16 UNITS/KG/HR: 5000 INJECTION, SOLUTION INTRAVENOUS at 11:19

## 2023-11-14 RX ADMIN — OXYCODONE HYDROCHLORIDE 10 MG: 10 TABLET, FILM COATED, EXTENDED RELEASE ORAL at 18:42

## 2023-11-14 RX ADMIN — OXYCODONE 5 MG: 5 TABLET ORAL at 12:06

## 2023-11-14 ASSESSMENT — PAIN DESCRIPTION - PAIN TYPE
TYPE: ACUTE PAIN

## 2023-11-14 ASSESSMENT — ENCOUNTER SYMPTOMS
SHORTNESS OF BREATH: 1
HEARTBURN: 0
DEPRESSION: 0
ABDOMINAL PAIN: 0
COUGH: 0
NAUSEA: 0
WHEEZING: 0
WEAKNESS: 0
MYALGIAS: 0
VOMITING: 0
NERVOUS/ANXIOUS: 0
CHILLS: 0
SHORTNESS OF BREATH: 0
PALPITATIONS: 0
SPEECH CHANGE: 0
DIARRHEA: 0
NERVOUS/ANXIOUS: 1
BLOOD IN STOOL: 0
WEAKNESS: 1
CONSTIPATION: 1
DIZZINESS: 0
FOCAL WEAKNESS: 0
FEVER: 0
BLURRED VISION: 0
SINUS PAIN: 0
BACK PAIN: 1
HEADACHES: 0

## 2023-11-14 ASSESSMENT — FIBROSIS 4 INDEX: FIB4 SCORE: 8.05

## 2023-11-14 ASSESSMENT — GAIT ASSESSMENTS
GAIT LEVEL OF ASSIST: MINIMAL ASSIST
DISTANCE (FEET): 5
ASSISTIVE DEVICE: FRONT WHEEL WALKER
DEVIATION: DECREASED BASE OF SUPPORT

## 2023-11-14 ASSESSMENT — COGNITIVE AND FUNCTIONAL STATUS - GENERAL
MOBILITY SCORE: 17
WALKING IN HOSPITAL ROOM: A LITTLE
MOVING FROM LYING ON BACK TO SITTING ON SIDE OF FLAT BED: A LITTLE
TURNING FROM BACK TO SIDE WHILE IN FLAT BAD: A LITTLE
CLIMB 3 TO 5 STEPS WITH RAILING: A LOT
MOVING TO AND FROM BED TO CHAIR: A LITTLE
SUGGESTED CMS G CODE MODIFIER MOBILITY: CK
STANDING UP FROM CHAIR USING ARMS: A LITTLE

## 2023-11-14 NOTE — THERAPY
Physical Therapy   Initial Evaluation     Patient Name: Adam Yan  Age:  68 y.o., Sex:  male  Medical Record #: [id number]  Today's Date: 11/14/2023          Assessment  Patient is 68 y.o. male with a diagnosis of past medical history of pancreatic adenocarcinoma stage IV, hypertension who presents to the hospital for generalized weakness, fatigue and jaundice for the past 1 week.  He also complains of shortness of breath on exertion. Pt is now s/p EKOS for PE treatment and biliary duct stent..  Additional factors influencing patient status / progress. Today, pt is very motivated, follows cues well, able to stand and march in place as well as ambulate 5 feet x 2 reps. Pt has good support at home, PT to follow to progress activity as tolerated. Pt is most limited today by endurance issues. .      Plan    Physical Therapy Initial Treatment Plan   Treatment Plan : Bed Mobility, Gait Training, Neuro Re-Education / Balance, Therapeutic Activities, Therapeutic Exercise  Treatment Frequency: 3 Times per Week  Duration: Until Therapy Goals Met    DC Equipment Recommendations: Unable to determine at this time (FWW depending on progress)  Discharge Recommendations: Recommend home health for continued physical therapy services (vs post acute placement depending on progress)            Objective       11/14/23 1002   Charge Group   PT Evaluation PT Evaluation High   Total Time Spent   PT Total Time Yes   PT Evaluation Time Spent (Mins) 30   PT Total Time Spent (Calculated) 30   Initial Contact Note    Initial Contact Note Order Received and Verified, Physical Therapy Evaluation in Progress with Full Report to Follow.   Vitals   O2 (LPM) 3   O2 Delivery Device Silicone Nasal Cannula   Pain 0 - 10 Group   Therapist Pain Assessment During Activity;Nurse Notified;0   Prior Living Situation   Prior Services None   Housing / Facility 1 Story House   Steps Into Home 1   Steps In Home 0   Elevator No   Equipment Owned None   Lives  with - Patient's Self Care Capacity Spouse  (Marilyn, works part time, can assist)   Prior Level of Functional Mobility   Bed Mobility Independent   Transfer Status Independent   Ambulation Independent   Ambulation Distance community   Assistive Devices Used None   Stairs Independent   Cognition    Cognition / Consciousness WDL   Level of Consciousness Alert   Comments following cues well   Active ROM Lower Body    Active ROM Lower Body  WDL   Strength Lower Body   Lower Body Strength  X   Comments 5/5 B LE, but lacks endurance, fatigue with ambulation.   Sensation Lower Body   Comments denies n/t B LE   Balance Assessment   Sitting Balance (Static) Fair   Sitting Balance (Dynamic) Fair   Standing Balance (Static) Fair   Standing Balance (Dynamic) Fair -   Weight Shift Sitting Fair   Weight Shift Standing Fair   Comments with FWW   Bed Mobility    Supine to Sit Minimal Assist   Sit to Supine Minimal Assist   Scooting Standby Assist   Rolling Minimum Assist to Lt.   Gait Analysis   Gait Level Of Assist Minimal Assist   Assistive Device Front Wheel Walker   Distance (Feet) 5   # of Times Distance was Traveled 2  (and march in place 10 seconds x 2 reps)   Deviation Decreased Base Of Support   Weight Bearing Status no restrictions   Functional Mobility   Sit to Stand Minimal Assist  (x 4 reps)   How much difficulty does the patient currently have...   Turning over in bed (including adjusting bedclothes, sheets and blankets)? 3   Sitting down on and standing up from a chair with arms (e.g., wheelchair, bedside commode, etc.) 3   Moving from lying on back to sitting on the side of the bed? 3   How much help from another person does the patient currently need...   Moving to and from a bed to a chair (including a wheelchair)? 3   Need to walk in a hospital room? 3   Climbing 3-5 steps with a railing? 2   6 clicks Mobility Score 17   Activity Tolerance   Standing 4 min x 3   Short Term Goals    Short Term Goal # 1 Pt will  perform bed mobility with supervision in 6 vistis with flat bed, no rail.   Short Term Goal # 2 Pt will transfer with supervision in 6 visits to improve functional indep.   Short Term Goal # 3 Pt will ambulate x 125 feet using FWW vs no AD with supervision in 6 visits.   Education Group   Education Provided Role of Physical Therapist   Role of Physical Therapist Patient Response Patient;Family;Acceptance;Explanation;Verbal Demonstration   Physical Therapy Initial Treatment Plan    Treatment Plan  Bed Mobility;Gait Training;Neuro Re-Education / Balance;Therapeutic Activities;Therapeutic Exercise   Treatment Frequency 3 Times per Week   Duration Until Therapy Goals Met   Problem List    Problems Impaired Bed Mobility;Impaired Transfers;Impaired Ambulation;Decreased Activity Tolerance   Anticipated Discharge Equipment and Recommendations   DC Equipment Recommendations Unable to determine at this time  (FWW depending on progress)   Discharge Recommendations Recommend home health for continued physical therapy services  (vs post acute placement depending on progress)   Interdisciplinary Plan of Care Collaboration   IDT Collaboration with  Nursing   Patient Position at End of Therapy In Bed;Call Light within Reach;Tray Table within Reach;Phone within Reach;Family / Friend in Room   Collaboration Comments nsg updated   Session Information   Date / Session Number  11/14-1 (1/3, 11/20)

## 2023-11-14 NOTE — CARE PLAN
The patient is Watcher - Medium risk of patient condition declining or worsening    Shift Goals  Clinical Goals: Hemodynamic stability  Patient Goals: Sleep  Family Goals: Sleep    Progress made toward(s) clinical / shift goals:    Problem: Knowledge Deficit - Standard  Goal: Patient and family/care givers will demonstrate understanding of plan of care, disease process/condition, diagnostic tests and medications  Outcome: Progressing     Problem: Hemodynamics  Goal: Patient's hemodynamics, fluid balance and neurologic status will be stable or improve  Outcome: Progressing     Problem: Urinary - Renal Perfusion  Goal: Ability to achieve and maintain adequate renal perfusion and functioning will improve  Outcome: Progressing     Problem: Pain - Standard  Goal: Alleviation of pain or a reduction in pain to the patient’s comfort goal  Outcome: Progressing     Problem: Skin Integrity  Goal: Skin integrity is maintained or improved  Outcome: Progressing

## 2023-11-14 NOTE — PROGRESS NOTES
EKOS 4 hour infusion completed. Dr Carey notified, cailin to remove catheter and sheath. Cailin for diet order as tolerated. Total Ekos therapy time 4 hours and 16 minutes. Removed at 1815, manual pressure held for more than 30 minutes until hemostasis was achieved.

## 2023-11-14 NOTE — CONSULTS
DATE OF SERVICE:  11/14/2023     ONCOLOGY CONSULTATION     REASON FOR CONSULTATION:  Pancreatic cancer.     HISTORY OF PRESENT ILLNESS:  The patient is a very nice 68-year-old man with   history of hypertension, GERD, as well as a past history of PE and right lower   extremity DVT in 2014 (treated with 6 months of anticoagulation on Xarelto),   who now has been diagnosed with pancreatic cancer, worrisome for metastatic   stage IV disease.  I have been asked to consult in the case.     His history goes back to his presentation at [facility] ER   on 10/06/2023 with complaints of abdominal pain over 1 month as well as   decreased appetite and weight loss.  He had abnormal LFTs.  He underwent a CT   scan of the abdomen and pelvis that showed pancreatic ductal dilation with a   questionable unclear pancreatic mass.  They did note diffuse miliary bilateral   lung nodules, worrisome for metastatic disease.  CA 19-9 was elevated at 552.    It sounds like he was not jaundiced at that time.  He was discharged with   referral to Oncology.     He saw Dr. Hatch who is an oncologist in [place] on 10/17/2023.  He   reviewed the records and was worried for metastatic disease as well.  He   recommended some form of biopsy.  The patient was set up to see [facility] for possible ERCP with biopsy.  If that was nonproductive, he   recommended a CT scan with pancreatic protocol for further evaluation with   potential IR-guided biopsy.  It sounds like he talked to him briefly about the   possibility of palliative chemotherapy for stage IV disease.  He was supposed   to have followup with Dr. Hatch within the next week.     In the meantime, he did see Dr. Heredia [facility], and he was   brought to endoscopy suite at [place] on 10/31/2023 and underwent an ERCP   with EUS procedure.  I do not have these reports, but it sounds like he was   diagnosed with pancreatic adenocarcinoma based on  samples collected during the   procedure.     Unfortunately, he presented to the ER on 11/11/2023.  About 3 days after his   ERCP, he began having trouble with jaundice.  He had ongoing weakness as well   as shortness of breath.  He presented to the emergency room here at [facility] and   his bilirubin was 12.2.  He was admitted for further care.     Since admitting here, he had a GI consult with Dr. Steph Scott, done on   11/12.  She recommended ERCP with stenting procedure.  Unfortunately, he   developed shock and was admitted to the ICU on 11/12/2023 and put on the   pressors.  He had further workup including an echocardiogram that showed an EF   of 70%, but there was evidence of right heart overload with RV strain.  He   underwent bilateral lower extremity ultrasound that showed bilateral DVTs.  He   had a CT angiogram that showed extensive bilateral PEs. Because of his   instability, on 11/13/2023, he underwent EKOS thrombectomy procedure under the   care of IR.  This was apparently successful, and he has been on a heparin drip   since that time.     His cancer diagnosis has been moving pretty quickly.  A lot has happened at   once.  They wanted to meet with another oncologist to discuss treatment   options, prognosis, and outcomes.     PAST MEDICAL HISTORY:  1.  Hypertension.  2.  PE/DVT of the right leg -- 2014, treated with 6 months of Xarelto.  3.  GERD.  4.  Extensive PE/bilateral lower extremity DVT -- just diagnosed.  5.  Pancreatic cancer.     PAST SURGICAL HISTORY:  1.  Right knee surgery.  2.  Left knee surgery.  3.  Appendectomy.     ALLERGIES:  No known drug allergies.     MEDICATIONS:  Here in the hospital:  1.  Wellbutrin- mg every day.  2.  Omeprazole 20 mg every day.  3.  OxyContin 10 mg b.i.d.  4.  Heparin drip.  5.  Levophed pressor drip.  6.  IV Dilaudid p.r.n.  7.  Hydroxyzine 25 mg 3 times a day p.r.n. itching.  8.  Zofran p.r.n.     FAMILY HISTORY:  He has no family history of  cancers.     SOCIAL HISTORY:  He was born in [place].  He lives in [place] with his wife,   Laura.  He has 3 children.  He works part-time as a .  He is a   lifelong nonsmoker.  He only drinks alcohol occasionally, but quit in 09/2023.     REVIEW OF SYSTEMS:  Review of system is positive for some ongoing abdominal   pain as well as occasional back pain.  He has had constipation.  He has had   shortness of breath as well as a dry chronic cough.  He has had some swelling   in the legs recently with the right being greater than the left.  He has had   poor appetite, fatigue, and weight loss.  He has had difficulty performing   some of the ADLs on his own.  Otherwise, a complete review of systems per the   AMA and CMS criteria is negative.     PHYSICAL EXAMINATION:    VITAL SIGNS:  His height is 6 feet 1 inches, weight 220 pounds, his T-max is   96.6, pulse 78, blood pressure 97/56, respirations 22, satting 96% on 3 liters   nasal cannula.  GENERAL:  No acute distress, pleasant gentleman, appears stated age.  HEENT:  NCAT.  He does have icteric sclerae.  His conjunctivae clear.    Oropharynx clear without any erythema, exudate, or discharge.  NECK:  Supple, nontender, no JVP, no carotid bruits, no thyromegaly.  CHEST:  Clear to auscultation and percussion bilaterally.  No wheeze, rales,   or rhonchi.  CARDIOVASCULAR:  Regular rate and rhythm.  No murmurs, gallops, or rubs.    Normal S1, S2.  ABDOMEN:  Soft, nontender, nondistended, no hepatosplenomegaly.  No guarding,   rebound, or masses.  LYMPH NODES:  No cervical, supraclavicular, infraclavicular, axillary, or   inguinal lymphadenopathy.  EXTREMITIES:  No cyanosis, clubbing, or edema.  Full range of motion, no joint   swelling.  SKIN:  He does have diffuse jaundice.  Some scattered bruises.  He has no   petechiae or nonhealing wounds or ulcerations.  NEUROLOGIC:  Cranial nerves II-XII are intact.  He has intact sensation to   light touch throughout.   He moves all 4 extremities appropriately.  PSYCHIATRIC:  He is alert and oriented x3.  He has normal mood and affect.     LABORATORY DATA:  White blood count 11.9, hemoglobin 12.7, hematocrit 36.7%,   platelets 74, MCV 94 with 79% neutrophils, 10% lymphocytes, 9% monocytes, 0%   eosinophils, 1% basophil.     Sodium 130, potassium 4.7, chloride 100, CO2 of 16, BUN 43, creatinine 1.3,   glucose 135, calcium 7.8, , , alkaline phosphatase 668,   bilirubin 7.8, albumin 2.4, protein 5.2, magnesium 2.2, phosphorus 4.1.  INR   1.97, PTT 22.7, PTT 52.3.     ASSESSMENT AND PLAN:  The patient is a very nice 68-year-old man with a   medical history listed above, who has a diagnosis of pancreatic   adenocarcinoma, likely metastatic with a CA 19-9 of 552, and incomplete   staging, but scan showing diffuse miliary lung nodules.  He has biliary   obstruction with jaundice.  He has a hypercoagulable state brought on by his   tumor with extensive PE and bilateral lower extremity DVTs treated with   thrombectomy.  He is now on a heparin drip.  He has had a somewhat poor   performance status with an ECOG of 2-3.  We have been asked to consult in the   case.     I was able to meet with the patient as well as his wife and 2 of his children   to discuss the case.  We talked about the fact that he likely has stage IV   metastatic disease.  We talked about the poor prognosis with metastatic   pancreatic cancer.  Typically, patients who elect for no treatment, have an   average survival of about 6 months.  For those who elect for treatment, which   is typically chemotherapy, we can usually extend life to about 1 year.     We talked about the treatment of pancreatic cancer.  The most aggressive chemo   regimen with the best results will be FOLFIRINOX chemotherapy.    Unfortunately, he would need to have a better bilirubin to receive this   treatment.  He would have to have bilirubin less than 1.5.  We talked about   the side  effects of chemo, which can include but not limited to hair loss,   nausea, vomiting, weakness, fatigue, loss of appetite, neuropathy (which in   some cases can be permanent) as well as lowering the blood counts with the   risk of infection and the rare risk of death from infection.  Someone would have   to be strong enough to receive it.  This is an aggressive chemo.  In cases   like this, there is usually a 50% chance he will have response, but also a 50%   chance he will not tolerate the treatment, or have such poor tolerance of   treatment that his performance status would sigificantly decline.  If someone is not strong   enough to handle the treatment, sometimes we can actually shorten their life due   to side effects of the chemotherapy.      There are other chemo regimens.  Other   regimens would include Gemzar and Abraxane.  His bilirubin would have to be   less than 1.5 for this as well.  If his bilirubin were not decreased, and he   had a poor performance status, we could consider more mild chemo regimens   including FOLFOX.  Another regimen could be single agent Gemzar.  The more   aggressive regimens would have a response rate of about 50%.  The more mild   chemo regimens would have a response rate of 20-30%.     We talked about hospice as an alternative option.  We talked about the pros   and cons of this approach.  They asked a lot of questions about the timing of   biliary stenting procedure.  Certainly, this would need to be determined by   his GI doctors.  It had to be done when they feel he is at a stable condition.    He would need to continue on the heparin drip prior to that procedure.  They   could then stopped it and allow him to receive the procedure and then turned   it back on.  Ultimately, when he was stable enough to go home, he could be   switched over to possibly a DOAC.  Other alternatives would include   subcutaneous Lovenox twice a day.     He does have thrombocytopenia.  He had this  when he came into the hospital.    He was just started on the heparin drip on 11/13 and his platelets since   admission were ranging from 120-136.  Today, his platelets are 74.  The timing   does not fit HIT, so I am not worried about that.  His platelet count may be   low related to nutrition, acute illness, as well as consumption with his heavy   clot burden.  Ongoing monitoring would be warranted.     At this point, there is not a whole lot else I can do as oncologist for this   inpatient stay.  I have answered all their questions.  They will think about   their options.  If they decide to pursue treatment, they will decide whether   to see me in my [place] office versus continuing with Dr. Holden joseph in   the [place].  I will make sure to give them my contact information.  They   can then reach out and let me know what they decide.  Otherwise, I will sign   off on the case.  I will be available for questions if further questions   arise.     Thank you very much for the referral of this nice gentleman        ______________________________  MD ISABEL ChavezB/LENIN    DD:  11/14/2023 12:18  DT:  11/14/2023 13:46    Job#:  [id number]

## 2023-11-14 NOTE — CARE PLAN
The patient is Watcher - Medium risk of patient condition declining or worsening    Shift Goals  Clinical Goals: Hemodynamic stability  Patient Goals: Sleep  Family Goals: Sleep    Progress made toward(s) clinical / shift goals:    Problem: Knowledge Deficit - Standard  Goal: Patient and family/care givers will demonstrate understanding of plan of care, disease process/condition, diagnostic tests and medications  11/14/2023 0632 by Dayanara Ferrer R.N.  Outcome: Progressing  11/14/2023 0456 by Dayanara Ferrer R.N.  Outcome: Progressing     Problem: Hemodynamics  Goal: Patient's hemodynamics, fluid balance and neurologic status will be stable or improve  11/14/2023 0632 by Dayanara Ferrer R.N.  Outcome: Progressing  11/14/2023 0456 by TRACEE LingN.  Outcome: Progressing     Problem: Urinary - Renal Perfusion  Goal: Ability to achieve and maintain adequate renal perfusion and functioning will improve  11/14/2023 0632 by Dayanara Ferrer R.N.  Outcome: Progressing  11/14/2023 0456 by Dayanara Ferrer R.N.  Outcome: Progressing     Problem: Pain - Standard  Goal: Alleviation of pain or a reduction in pain to the patient’s comfort goal  11/14/2023 0632 by Dayanara Ferrer R.N.  Outcome: Progressing  11/14/2023 0456 by Dayanara Ferrer R.N.  Outcome: Progressing     Problem: Skin Integrity  Goal: Skin integrity is maintained or improved  11/14/2023 0632 by Dayanara Ferrer R.N.  Outcome: Progressing  11/14/2023 0456 by Dayanara Ferrer R.N.  Outcome: Progressing

## 2023-11-14 NOTE — PROGRESS NOTES
"Critical Care Progress Note    Date of admission  11/11/2023    Chief Complaint  68 y.o. male admitted 11/11/2023 with worsening weakness. Transferred to ICU on 11/11 for shock    Hospital Course  \"68 y.o. male with a pmhx of HTN, GERD, gout who presented 11/11/2023 with jaundice, poor oral intake and malaise.  The patient and his wife report approximately 1 month diagnosis of a pancreatic mass and lung metastasis.  He underwent a EGD on 10/31/2023 by Dr. Heredia at [facility] for biopsy of this mass and unfortunately the pathology has returned 3 days ago with adenocarcinoma.  Given the pulmonary metastasis this would stage him to stage IV pancreatic adenocarcinoma, they have a follow-up appointment with his oncologist Dr. Hatch at [facility] next week.  Over the past week the patient's wife states he has been progressively weaker mostly staying in bed with the exception of going to the bathroom.  He has had incredibly poor oral intake and they have been trying bone broths and boost to increase his caloric intake.  He presented to the ER where he was found to have a fairly unremarkable CBC, hyponatremia at 124, ADAM with a creatinine of 1.63, BUN 45, AST and ALT elevations at 110 and 150 respectively and an alkaline phosphatase of 788.  In addition to this his total bilirubin was noted at 12.2.  Other than his weakness, malaise and inability to eat he has noted some acid epigastric pain which does radiate to his back.  He denies any bowel or bladder changes, he has had some increasing dyspnea however no chest pain.  Given the abruptness of this symptoms onset and the acuity of his diagnosis he has not had the opportunity to discuss treatment plans or goals of care with his oncology team.     11/12 - Admit ICU shock (hypovelemic vs septic/distributive), hyperbili from mechanical obstruction d/t panc mass.  Titrating NE, Zosyn, GI to stent tomorrow AM, DNR/DNI\" - Per Dr. Green's note    11/13 - Requiring 0.06 levo, LR @ 100 " cc/hr. ECHO demonstrating normal EF of 70%, however with RV overload and reduced RV fxn, RVSP 55. U/S BLEs demonstrating bilateral DVT, CTA chest demonstrating extensive bilateral PE's w/ RV strain. IR consulted, initiated on Heparin bolus/drip. S/p EKOS catheter placement with catheter-directed thrombolysis    Interval Problem Update  Reviewed last 24 hour events:  S/P EKOS catheter directed thrombolysis (catheters removed 11/13 evening). Significant subjective improvement, requiring 0.06 levo. On 3 L NC supplementation. Currently on therapeutic anticoagulation with IV heparin. Oncology to see patient today.    Review of Systems  Review of Systems   Constitutional:  Negative for chills and fever.   Respiratory:  Negative for cough, shortness of breath and wheezing.    Cardiovascular:  Negative for chest pain and palpitations.   Gastrointestinal:  Negative for abdominal pain, blood in stool, melena, nausea and vomiting.   Genitourinary:  Negative for dysuria and hematuria.   Neurological:  Negative for speech change, focal weakness and headaches.   Psychiatric/Behavioral:  The patient is nervous/anxious.         Vital Signs for last 24 hours   Temp:  [35.7 °C (96.3 °F)-35.9 °C (96.6 °F)] 35.9 °C (96.6 °F)  Pulse:  [72-99] 78  Resp:  [18-48] 22  BP: ()/(50-76) 97/56  SpO2:  [88 %-97 %] 96 %    Hemodynamic parameters for last 24 hours  CVP:  [36 MM HG] 36 MM HG    Respiratory Information for the last 24 hours       Physical Exam   Physical Exam  Constitutional:       Appearance: He is ill-appearing.   HENT:      Head: Normocephalic and atraumatic.      Mouth/Throat:      Mouth: Mucous membranes are moist.   Eyes:      General: Scleral icterus present.      Extraocular Movements: Extraocular movements intact.      Pupils: Pupils are equal, round, and reactive to light.   Cardiovascular:      Rate and Rhythm: Normal rate and regular rhythm.      Heart sounds: No murmur heard.     No friction rub. No gallop.    Pulmonary:      Effort: Pulmonary effort is normal.      Breath sounds: Normal breath sounds. No wheezing, rhonchi or rales.   Abdominal:      General: Abdomen is flat.      Tenderness: There is no abdominal tenderness. There is no guarding or rebound.   Musculoskeletal:      Right lower leg: No edema.      Left lower leg: No edema.      Comments: Right femoral dressing C/D/I   Skin:     Coloration: Skin is jaundiced.   Neurological:      General: No focal deficit present.      Mental Status: He is alert and oriented to person, place, and time.         Medications  Current Facility-Administered Medications   Medication Dose Route Frequency Provider Last Rate Last Admin    LORazepam (Ativan) injection 0.5 mg  0.5 mg Intravenous Q4HRS PRN Cosme Tralyor M.D.   0.5 mg at 11/13/23 2058    heparin infusion 25,000 units in 500 mL 0.45% NACL  0-30 Units/kg/hr Intravenous Continuous Misha Kirby M.D. 32 mL/hr at 11/14/23 0657 16 Units/kg/hr at 11/14/23 0657    heparin injection 4,000 Units  40 Units/kg Intravenous PRN Misha Kirby M.D.   4,000 Units at 11/13/23 2108    oxyCODONE CR (OxyCONTIN) tablet 10 mg  10 mg Oral Q12HRS Real Green M.D.   10 mg at 11/14/23 0602    HYDROmorphone (Dilaudid) injection 0.25 mg  0.25 mg Intravenous Q HOUR PRN Real Green M.D.   0.25 mg at 11/13/23 1828    MD Alert...ICU Electrolyte Replacement per Pharmacy   Other PHARMACY TO DOSE Cosme Traylor M.D.        omeprazole (PriLOSEC) capsule 20 mg  20 mg Oral DAILY Vinnie Matias M.D.   20 mg at 11/14/23 0602    buPROPion SR (Wellbutrin-SR) tablet 150 mg  150 mg Oral QAM Vinnie Matias M.D.   150 mg at 11/14/23 0602    oxyCODONE immediate-release (Roxicodone) tablet 5 mg  5 mg Oral Q3HRS PRN Vinnie Matias M.D.   5 mg at 11/13/23 0110    Or    oxyCODONE immediate release (Roxicodone) tablet 10 mg  10 mg Oral Q3HRS PRN Vinnie Matias M.D.   10 mg at 11/11/23 2137    labetalol (Normodyne/Trandate) injection 10 mg   10 mg Intravenous Q4HRS PRN Vinnie Matias M.D.        ondansetron (Zofran) syringe/vial injection 4 mg  4 mg Intravenous Q4HRS PRN Vinnie Matias M.D.        ondansetron (Zofran ODT) dispertab 4 mg  4 mg Oral Q4HRS PRN Vinnie Matias M.D.        hydrOXYzine HCl (Atarax) tablet 25 mg  25 mg Oral TID PRN Vinnie Matias M.D.   25 mg at 11/11/23 2026    norepinephrine (Levophed) 8 mg in 250 mL NS infusion (premix)  0-1 mcg/kg/min (Ideal) Intravenous Continuous Cristino Sosa Jr., D.O. 15 mL/hr at 11/14/23 0253 0.1 mcg/kg/min at 11/14/23 0253       Fluids    Intake/Output Summary (Last 24 hours) at 11/14/2023 0920  Last data filed at 11/14/2023 0800  Gross per 24 hour   Intake 1811.87 ml   Output 900 ml   Net 911.87 ml       Laboratory          Recent Labs     11/11/23  1606 11/12/23  0600 11/13/23  0525 11/14/23  0610   SODIUM 126* 127* 127* 130*   POTASSIUM 4.9 4.8 4.8 4.7   CHLORIDE 94* 96 96 100   CO2 19* 17* 16* 16*   BUN 45* 47* 44* 43*   CREATININE 1.63* 1.49* 1.35 1.30   MAGNESIUM 2.4  --  2.1 2.2   PHOSPHORUS 4.2  --  4.0 4.3   CALCIUM 8.6 7.9* 8.1* 7.8*     Recent Labs     11/11/23  1409 11/11/23  1606 11/12/23  0600 11/13/23  0525 11/14/23  0610   ALTSGPT 151* 150* 130* 133* 131*   ASTSGOT 127* 110* 90* 101* 108*   ALKPHOSPHAT 768* 788* 709* 676* 668*   TBILIRUBIN 12.1* 12.2* 10.8* 9.8* 7.8*   LIPASE 26 24  --   --   --    GLUCOSE 122* 119* 144* 140* 135*     Recent Labs     11/12/23  0600 11/13/23  0525 11/13/23 1935 11/14/23  0330 11/14/23  0610   WBC 10.6 10.8 10.8 11.9*  --    NEUTSPOLYS 75.30* 80.30*  --  79.30*  --    LYMPHOCYTES 12.40* 8.40*  --  9.50*  --    MONOCYTES 9.50 8.50  --  8.80  --    EOSINOPHILS 0.60 0.40  --  0.30  --    BASOPHILS 0.70 0.50  --  0.70  --    ASTSGOT 90* 101*  --   --  108*   ALTSGPT 130* 133*  --   --  131*   ALKPHOSPHAT 709* 676*  --   --  668*   TBILIRUBIN 10.8* 9.8*  --   --  7.8*     Recent Labs     11/13/23  0525 11/13/23  0736 11/13/23  1030 11/13/23 1935  11/14/23  0330   RBC 3.96*  --   --  3.90* 3.90*   HEMOGLOBIN 13.0*  --   --  12.6* 12.7*   HEMATOCRIT 36.5*  --   --  37.0* 36.7*   PLATELETCT 120*  --   --  136* 74*   PROTHROMBTM  --  20.7* 21.1* 22.7*  --    APTT  --  29.8 28.7 52.3*  --    INR  --  1.75* 1.79* 1.97*  --        Imaging  CT:    Reviewed  Echo:   Reviewed  Ultrasound:  Reviewed    Assessment/Plan  * Shock (HCC)- (present on admission)  Assessment & Plan  Initial concerns for hypovolemic vs. Septic/distributive shock. Found to have submassive bilateral PE on 11/13, therefore likely obstructive shock. S/p EKOS catheter directed thrombolysis (11/13)    -Continue pressor support with levophed to maintain MAP >65 mmHg, titrate as appropriate  -Continue to monitor hemodynamics    Acute pulmonary embolism with acute cor pulmonale (HCC)  Assessment & Plan  ECHO demonstrating normal EF of 70%, however with RV overload and reduced RV fxn, RVSP 55. U/S BLEs demonstrating bilateral DVTs, CTA chest demonstrating extensive bilateral PE's w/ RV strain. Like sequale of Stage IV pancreatic adenocarcinoma. SPESI score of 2, therefore high-risk for mortality.  S/p EKOS catheter directed thrombolysis (11/13), appreciate IR support.    -Telemetry monitoring with continuous pulse oximetry  -Continue therapeutic anticoagulation with IV heparin, transition to DOAC when clinically appropriate    Biliary obstruction- (present on admission)  Assessment & Plan  Hx of Stage IV pancreatic adenocarcinoma. Noted to be encompassing portal, splenic and mesenteric veins, as well as causing CBD dilatation. GI consulted, appreciate support    -Tentative plan for ERCP w/ stent placement, appreciate GI support  -Continue to monitor/trend    DVT (deep venous thrombosis) (HCC)- (present on admission)  Assessment & Plan  Patient has hx of RLE DVT in 8/2015. U/S BLE demonstrating acute to subacute occlusive RLE thrombus, and chronic partially occlusive LLE thrombus. Likely in setting of  metastatic pancreatic adenocarcinoma.    -Continue therapeutic anticoagulation with IV heparin  -Transition to DOAC following tentative ERCP with stent placement    Right ventricular failure (HCC)  Assessment & Plan  ECHO demonstrating RV volume overload and reduced RV functionm RVSP of 55. Likely in setting of Bilateral submassive PE, as seen on CTA Chest. S/p EKOS catheter directed thrombolysis (11/13)    -Continue therapeutic anticoagulation with IV heparin  -Continue to monitor    Acute kidney injury (HCC)- (present on admission)  Assessment & Plan  ADAM on presentation w/ BUN 45 and Cr of 1.63 (baseline ~1.1-1.3). Likely prerenal in setting of poor p.o. intake and subsequent shock.    -Cr improved to 1.30  -Strict I/Os   -Avoid nephrotoxic agents  -Renally adjust medications    Pancreatic adenocarcinoma (HCC)- (present on admission)  Assessment & Plan  Patient has history of Stage IV pancreatic adenocarcinoma  with pulmonary and peritoneal metastases, leading to current biliary obstruction as well as bilateral DVTs/PES    -Continue to monitor clinical status  -Palliative care consult pending  -Oncology to see patient today, appreciate support    Primary hypertension- (present on admission)  Assessment & Plan  History of HTN, on lisinopril 20 mg outpatient    -Hold antihypertensives in the setting of shock  -Reinitiate lisinopril when clinically appropriate.          VTE:  Heparin  Ulcer: PPI  Lines: None    I have performed a physical exam and reviewed and updated ROS and Plan today (11/14/2023). In review of yesterday's note (11/13/2023), there are no changes except as documented above.     Discussed patient condition and risk of morbidity and/or mortality with Family, RN, RT, Pharmacy, and Patient

## 2023-11-14 NOTE — CARE PLAN
The patient is Watcher - Medium risk of patient condition declining or worsening    Shift Goals  Clinical Goals: Hemodynamic stability, pain management  Patient Goals: Sleep  Family Goals: Sleep    Progress made toward(s) clinical / shift goals:  EKOS placed and removed for PE    Problem: Knowledge Deficit - Standard  Goal: Patient and family/care givers will demonstrate understanding of plan of care, disease process/condition, diagnostic tests and medications  Description: Target End Date:  1-3 days or as soon as patient condition allows    Document in Patient Education    1.  Patient and family/caregiver oriented to unit, equipment, visitation policy and means for communicating concern  2.  Complete/review Learning Assessment  3.  Assess knowledge level of disease process/condition, treatment plan, diagnostic tests and medications  4.  Explain disease process/condition, treatment plan, diagnostic tests and medications  Outcome: Progressing  Note: Educating on patient care and interventions     Problem: Hemodynamics  Goal: Patient's hemodynamics, fluid balance and neurologic status will be stable or improve  Description: Target End Date:  Prior to discharge or change in level of care    Document on Assessment and I/O flowsheet templates    1.  Monitor vital signs, pulse oximetry and cardiac monitor per provider order and/or policy  2.  Maintain blood pressure per provider order  3.  Hemodynamic monitoring per provider order  4.  Manage IV fluids and IV infusions  5.  Monitor intake and output  6.  Daily weights per unit policy or provider order  7.  Assess peripheral pulses and capillary refill  8.  Assess color and body temperature  9.  Position patient for maximum circulation/cardiac output  10. Monitor for signs/symptoms of excessive bleeding  11. Assess mental status, restlessness and changes in level of consciousness  12. Monitor temperature and report fever or hypothermia to provider immediately.  Consideration of targeted temperature management.  Outcome: Progressing  Note: Titrating levophed for parameters     Problem: Pain - Standard  Goal: Alleviation of pain or a reduction in pain to the patient’s comfort goal  Description: Target End Date:  Prior to discharge or change in level of care    Document on Vitals flowsheet    1.  Document pain using the appropriate pain scale per order or unit policy  2.  Educate and implement non-pharmacologic comfort measures (i.e. relaxation, distraction, massage, cold/heat therapy, etc.)  3.  Pain management medications as ordered  4.  Reassess pain after pain med administration per policy  5.  If opiods administered assess patient's response to pain medication is appropriate per POSS sedation scale  6.  Follow pain management plan developed in collaboration with patient and interdisciplinary team (including palliative care or pain specialists if applicable)  Outcome: Progressing  Note: Pain assessed, interventions given as indicated and interventions reassessed.         Patient is not progressing towards the following goals:

## 2023-11-14 NOTE — PROGRESS NOTES
Radiology Progress Note   Author: DOMINIQUE Raphael Date & Time created: 11/14/2023  10:22 AM   Date of admission  11/11/2023  Note to reader: this note follows the APSO format rather than the historical SOAP format. Assessment and plan located at the top of the note for ease of use.    Chief Complaint  68 y.o. male admitted 11/11/2023 with   Chief Complaint   Patient presents with    Weakness     BIB EMS from home for generalized weakness x3 months, increasing in severity today. States that ADLs like brushing his teeth make him weak. Pt reports recent diagnosis of Stage 4 pancreatic cancer.     Shortness of Breath     SOB x4 days. Pt denies home O2 use, 94% on RA.          HPI  68-year-old male with past medical history of HTN, GERD, gout and very recent diagnosis of pancreatic adenocarcinoma with metastasis to the lungs admitted 11/11/23 after presenting for weakness, malaise, jaundice, and shortness of breath with exertion x 1 week.  Patient admitted for elevated liver enzymes, acute renal failure, possible biliary obstruction, and shock.  ERCP was planned through GI; however, patient's shortness of breath continued so CTA was ordered showing bilateral pulmonary emboli with lower extremity venous Doppler showing DVTs.  Patient was started on heparin and underwent EKOS procedure with IR Dr. Kirby on 11/13/23.     Interval history:  11/14/2023 - patient feeling much better today.  No work of breathing.  SpO2 94-96 on 3 L nasal cannula.  Right groin access site soft, with mild ecchymosis, dressing CDI.     Assessment/Plan     Principal Problem:    Shock (HCC)  Active Problems:    DVT (deep venous thrombosis) (HCC)    Primary hypertension    Biliary obstruction    Pancreatic adenocarcinoma (HCC)    Acute kidney injury (HCC)    Goals of care, counseling/discussion    Right ventricular failure (HCC)    Acute pulmonary embolism with acute cor pulmonale (HCC)      Plan IR  - No lifting greater than 5 lbs and  no baths/ swimming/ soaking in tub for 5 days. Shower OK. OK to change dressings/band aid as needed.  - Anticoagulation per medical team  - No further IR intervention planned at this time  - Signing off  -  -Thank you for allowing Interventional Radiology team to participate in the patients care, if any additional care or requests are needed in the future please do not hesitate call or place IR order   [phone]           Review of Systems  Physical Exam   Review of Systems   Constitutional:  Negative for chills and fever.   Respiratory:  Negative for shortness of breath.    Cardiovascular:  Negative for chest pain and palpitations.   Gastrointestinal:  Negative for nausea and vomiting.   Neurological:  Negative for weakness and headaches.   Psychiatric/Behavioral:  The patient is nervous/anxious.       Vitals:    11/14/23 0845   BP: 97/56   Pulse: 78   Resp: (!) 22   Temp:    SpO2: 96%        Physical Exam  Eyes:      General: Scleral icterus present.   Cardiovascular:      Rate and Rhythm: Normal rate.      Pulses: Normal pulses.   Pulmonary:      Effort: Pulmonary effort is normal. No respiratory distress.      Comments: Nasal cannula in place  Abdominal:      Palpations: Abdomen is soft.   Skin:     General: Skin is warm and dry.      Comments: Right femoral vein access site soft, with mild ecchymosis, dressing CDI.   Neurological:      General: No focal deficit present.      Mental Status: He is alert and oriented to person, place, and time.   Psychiatric:         Mood and Affect: Mood normal.         Behavior: Behavior normal.             Labs    Recent Labs     11/13/23  0525 11/13/23  1935 11/14/23  0330   WBC 10.8 10.8 11.9*   RBC 3.96* 3.90* 3.90*   HEMOGLOBIN 13.0* 12.6* 12.7*   HEMATOCRIT 36.5* 37.0* 36.7*   MCV 92.2 94.9 94.1   MCH 32.8 32.3 32.6   MCHC 35.6 34.1 34.6   RDW 50.0 53.0* 53.8*   PLATELETCT 120* 136* 74*   MPV 9.6 9.7 11.3     Recent Labs     11/12/23  0600 11/13/23  0525 11/14/23  0610    SODIUM 127* 127* 130*   POTASSIUM 4.8 4.8 4.7   CHLORIDE 96 96 100   CO2 17* 16* 16*   GLUCOSE 144* 140* 135*   BUN 47* 44* 43*   CREATININE 1.49* 1.35 1.30   CALCIUM 7.9* 8.1* 7.8*     Recent Labs     11/12/23  0600 11/13/23  0525 11/14/23  0610   ALBUMIN 2.7* 2.5* 2.4*   TBILIRUBIN 10.8* 9.8* 7.8*   ALKPHOSPHAT 709* 676* 668*   TOTPROTEIN 5.2* 5.5* 5.2*   ALTSGPT 130* 133* 131*   ASTSGOT 90* 101* 108*   CREATININE 1.49* 1.35 1.30     IR-PULMONARY ARTERY-R-L SELECTIVE RIGHT   Final Result      1.  Pulmonary hypertension as recorded above.   2.  BILATERAL selective pulmonary arteriograms most notable for large distal right main pulmonary artery clot filling defect concordant with CTA findings.   3.  Placement of BILATERAL EKOS catheters   4.  Initiation of bilateral thrombolytic infusion with EKOS acoustic pulse thrombolysis therapy (US-assisted thrombolysis) for duration 4 hours      US-EXTREMITY VENOUS LOWER BILAT   Final Result      CT-CTA CHEST PULMONARY ARTERY W/ RECONS   Final Result      1.  Extensive bilateral pulmonary emboli involving the main pulmonary arteries, segmental, and subsegmental branches. RV strain noted.      2.  Diffuse metastatic disease throughout the lung fields.      3.  Confluent masslike infiltrates in the periphery of the lung fields bilaterally consistent with metastatic disease and/or pneumonitis.      4.  Mild to moderate bilateral pleural effusions.            EC-ECHOCARDIOGRAM COMPLETE W/ CONT   Final Result      CT-ABDOMEN-PELVIS WITH   Final Result         1. 3 cm ill-defined pancreatic mass, which could represent pancreatic adenocarcinoma or cholangiocarcinoma. There is encasement of the adjacent portal, splenic, and mesenteric veins. There is common bile duct dilation above the level of the lesion.   2. There are several peritoneal nodules, largest measuring 4.1 cm. There is also ascites. Therefore, consistent with peritoneal carcinomatosis.   3. Innumerable lower lung zone  "nodules, which could represent metastatic disease or a benign process.   4. Liver appears cirrhotic.   5. Density within the gallbladder, as above.   6. Simple small right renal cyst, which does not require follow-up.      DX-CHEST-PORTABLE (1 VIEW)   Final Result      Hypoinflation with prominent interstitial and numerous small ill-defined pulmonary nodules bilaterally may indicate granulomatous disease, pneumonitis or neoplastic process.          INR   Date Value Ref Range Status   11/13/2023 1.97 (H) 0.87 - 1.13 Final     Comment:     INR - Non-therapeutic Reference Range: 0.87-1.13  INR - Therapeutic Reference Range: 2.0-4.0       No results found for: \"POCINR\"     Intake/Output Summary (Last 24 hours) at 11/14/2023 1022  Last data filed at 11/14/2023 0800  Gross per 24 hour   Intake 1685.09 ml   Output 900 ml   Net 785.09 ml      Labs not explicitly included in this progress note were reviewed by the author. Radiology/imaging not explicitly included in this progress note was reviewed by the author.     I have performed a physical exam and reviewed and updated ROS and Plan today (11/14/2023).     45 minutes in directly providing and coordinating care and extensive data review.  No time overlap and excludes procedures.  "

## 2023-11-14 NOTE — PROGRESS NOTES
Inpatient Palliative Care     Location: [place] [address]     HPI:   Adam is seen lying in bed, his color is improved.  He reports he is feeling better less short of breath.  He has utilized no pain medication overnight but did utilize lorazepam and had a good nights rest.  Levophed has been discontinued.     Summary:  Patient more hopeful today, encouraged that procedure went well yesterday and he is improved.  Briefly discussed upcoming stent placement per GI benefits for symptom management.  Family agrees.  We will continue to discuss future care as clinical picture unfolds.     Active listening, reflection, reminiscing, validation & normalization, and empathic support utilized throughout this encounter.  All questions answered and contact information provided, encouraged to call with any questions or concerns.      Plan:     1) PC to continue to follow  2) we will complete POLST prior to discharge.  3)        Thank you for allowing me the opportunity to participate in the care of  Adam.     I spent a total of 37 minutes reviewing medical records, direct face-to-face time with the patient and/or family, coordination of care, and documentation. This is separate from the time spent on advance care planning, which is documented above.     Elvia Schmitz, MSN, APRN, ACNPC-AG.  Palliative Care Nurse Practitioner  [phone]

## 2023-11-14 NOTE — PROGRESS NOTES
Critical Care Progress Note    Date of admission  11/11/2023    Chief Complaint  68 y.o. male admitted 11/11/2023 with worsening weakness.  He has a history of stage IV adenocarcinoma the pancreas, primary hypertension, GERD and gout.    Hospital Course      11/12-Admit ICU shock (hypovelemic vs septic/distributive), hyperbili from mechanical obstruction d/t panc mass.  Titrating NE, Zosyn, GI to stent tomorrow AM, DNR/DNI    11/13 -    CTA with bilateral pulmonary emboli and lower extremity venous Doppler with DVT.  Echocardiogram confirms acute right heart failure.  EKOS today.  Begin full anticoagulation with heparin.  Hold on biliary stent.  11/14 -    tolerated EKOS well yesterday.  Titrating heparin and norepinephrine.  11/15 -    titrating heparin and norepinephrine.      Interval Problem Update  Reviewed last 24 hour events:      SR  NE  Heparin  96.6  +115 mL in the last 24  +6939 mL since admit      Review of Systems  Review of Systems   Unable to perform ROS: Acuity of condition        Vital Signs for last 24 hours   Temp:  [35.8 °C (96.5 °F)-35.9 °C (96.6 °F)] 35.8 °C (96.5 °F)  Pulse:  [68-91] 74  Resp:  [12-49] 16  BP: ()/(49-72) 106/60  SpO2:  [90 %-98 %] 92 %    Hemodynamic parameters for last 24 hours       Respiratory Information for the last 24 hours       Physical Exam   Physical Exam  Constitutional:       Appearance: He is not diaphoretic.   HENT:      Head: Normocephalic.      Mouth/Throat:      Pharynx: Oropharynx is clear.   Eyes:      General: Scleral icterus present.      Pupils: Pupils are equal, round, and reactive to light.   Cardiovascular:      Comments: Sinus rhythm  Pulmonary:      Breath sounds: Rales (Scattered crackles) present. No wheezing.   Abdominal:      General: There is no distension.      Tenderness: There is no abdominal tenderness.   Musculoskeletal:      Right lower leg: No edema.      Left lower leg: No edema.   Skin:     General: Skin is warm.   Neurological:       General: No focal deficit present.      Mental Status: He is oriented to person, place, and time.      Cranial Nerves: No cranial nerve deficit.         Medications  Current Facility-Administered Medications   Medication Dose Route Frequency Provider Last Rate Last Admin    LORazepam (Ativan) injection 0.5 mg  0.5 mg Intravenous Q4HRS PRN Cosme Traylor M.D.   0.5 mg at 11/14/23 1752    heparin infusion 25,000 units in 500 mL 0.45% NACL  0-30 Units/kg/hr Intravenous Continuous Misha Kirby M.D. 24 mL/hr at 11/15/23 0654 12 Units/kg/hr at 11/15/23 0654    heparin injection 4,000 Units  40 Units/kg Intravenous PRN Misha Kirby M.D.   4,000 Units at 11/13/23 2108    oxyCODONE CR (OxyCONTIN) tablet 10 mg  10 mg Oral Q12HRS Real Green M.D.   10 mg at 11/15/23 0559    HYDROmorphone (Dilaudid) injection 0.25 mg  0.25 mg Intravenous Q HOUR PRN Real Green M.D.   0.25 mg at 11/15/23 0339    MD Alert...ICU Electrolyte Replacement per Pharmacy   Other PHARMACY TO DOSE Cosme Traylor M.D.        omeprazole (PriLOSEC) capsule 20 mg  20 mg Oral DAILY Vinnie Matias M.D.   20 mg at 11/15/23 0559    buPROPion SR (Wellbutrin-SR) tablet 150 mg  150 mg Oral QAM Vinnie Matias M.D.   150 mg at 11/15/23 0559    oxyCODONE immediate-release (Roxicodone) tablet 5 mg  5 mg Oral Q3HRS PRN Vinnie Matias M.D.   5 mg at 11/14/23 2224    Or    oxyCODONE immediate release (Roxicodone) tablet 10 mg  10 mg Oral Q3HRS PRN Vinnie Matias M.D.   10 mg at 11/15/23 0226    labetalol (Normodyne/Trandate) injection 10 mg  10 mg Intravenous Q4HRS PRN Vinnie Matias M.D.        ondansetron (Zofran) syringe/vial injection 4 mg  4 mg Intravenous Q4HRS PRN Vinnie Matias M.D.        ondansetron (Zofran ODT) dispertab 4 mg  4 mg Oral Q4HRS PRN Vinnie Matias M.D.        hydrOXYzine HCl (Atarax) tablet 25 mg  25 mg Oral TID PRN Vinnie Matias M.D.   25 mg at 11/11/23 2026    norepinephrine (Levophed) 8 mg in 250 mL NS infusion  (premix)  0-1 mcg/kg/min (Ideal) Intravenous Continuous Cristino Sosa Jr., D.O. 6 mL/hr at 11/14/23 2257 0.04 mcg/kg/min at 11/14/23 2257       Fluids    Intake/Output Summary (Last 24 hours) at 11/15/2023 0739  Last data filed at 11/15/2023 0400  Gross per 24 hour   Intake 1114.99 ml   Output 1000 ml   Net 114.99 ml       Laboratory          Recent Labs     11/13/23  0525 11/14/23  0610 11/15/23  0345   SODIUM 127* 130* 130*   POTASSIUM 4.8 4.7 4.3   CHLORIDE 96 100 101   CO2 16* 16* 19*   BUN 44* 43* 38*   CREATININE 1.35 1.30 1.16   MAGNESIUM 2.1 2.2 2.3   PHOSPHORUS 4.0 4.3 2.9   CALCIUM 8.1* 7.8* 7.8*     Recent Labs     11/13/23  0525 11/14/23  0610 11/15/23  0345   ALTSGPT 133* 131* 152*   ASTSGOT 101* 108* 148*   ALKPHOSPHAT 676* 668* 664*   TBILIRUBIN 9.8* 7.8* 7.6*   GLUCOSE 140* 135* 119*     Recent Labs     11/13/23  0525 11/13/23 1935 11/14/23 0330 11/14/23  0610 11/15/23  0345   WBC 10.8 10.8 11.9*  --  9.1   NEUTSPOLYS 80.30*  --  79.30*  --  79.80*   LYMPHOCYTES 8.40*  --  9.50*  --  9.90*   MONOCYTES 8.50  --  8.80  --  7.70   EOSINOPHILS 0.40  --  0.30  --  1.00   BASOPHILS 0.50  --  0.70  --  0.40   ASTSGOT 101*  --   --  108* 148*   ALTSGPT 133*  --   --  131* 152*   ALKPHOSPHAT 676*  --   --  668* 664*   TBILIRUBIN 9.8*  --   --  7.8* 7.6*     Recent Labs     11/13/23  0736 11/13/23  1030 11/13/23 1935 11/14/23  0330 11/15/23  0345   RBC  --   --  3.90* 3.90* 3.56*   HEMOGLOBIN  --   --  12.6* 12.7* 11.4*   HEMATOCRIT  --   --  37.0* 36.7* 33.2*   PLATELETCT  --   --  136* 74* 134*   PROTHROMBTM 20.7* 21.1* 22.7*  --   --    APTT 29.8 28.7 52.3*  --   --    INR 1.75* 1.79* 1.97*  --   --        Imaging  None    Assessment/Plan  * Shock (HCC)- (present on admission)  Assessment & Plan  Obstructive shock with right ventricular failure due to acute pulmonary embolism  I am titrating norepinephrine to keep mean arterial pressure greater than 65    Acute pulmonary embolism with acute cor  pulmonale (HCC)  Assessment & Plan  CTA confirms bilateral pulmonary embolism with acute right heart failure  S/P EKOS catheter directed thrombolysis on 11/13  I am titrating heparin based upon serial anti-Xa determinations    Right ventricular failure (HCC)  Assessment & Plan  Echocardiogram with severely dilated RV and RV volume and pressure overload and reduced RV systolic function  Due to pulmonary embolism  RVSP 55 mmHg    Pancreatic adenocarcinoma (HCC)- (present on admission)  Assessment & Plan  Stage IV adenocarcinoma of the pancreas with evidence of pulmonary metastases and peritoneal carcinomatosis  Appreciate Dr. Frey's consult    Acute kidney injury (HCC)- (present on admission)  Assessment & Plan  Monitor renal function and urine output  Avoid nephrotoxins and renal dose medications  Resolved    Biliary obstruction- (present on admission)  Assessment & Plan  Pancreatic carcinoma encasing portal, 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  ERCP with stent placement on hold with acute pulmonary embolism and associated right heart strain    Primary hypertension- (present on admission)  Assessment & Plan  History of  Currently on vasopressor support    DVT (deep venous thrombosis) (HCC)- (present on admission)  Assessment & Plan  History of right lower extremity DVT in August 2015  Lower extremity venous doppler with acute bilateral DVT  I am titrating heparin based upon serial anti-Xa determinations         VTE:  Heparin  Ulcer: PPI  Lines: None    I have performed a physical exam and reviewed and updated ROS and Plan today (11/15/2023). In review of yesterday's note (11/14/2023), there are no changes except as documented above.     I have assessed and reassessed his respiratory status, hemodynamics, blood pressure, cardiovascular status with titration of norepinephrine, serial determinations of anticoagulation status with titration of heparin.  He is at increased risk for worsening  respiratory, cardiovascular and hematologic system dysfunction.    Discussed patient condition and risk of morbidity and/or mortality with RN, RT, Pharmacy, Charge nurse / hot rounds, and QA team    The patient remains critically ill.  Critical care time = 40 minutes in directly providing and coordinating critical care and extensive data review.  No time overlap and excludes procedures.    Cosme Traylor MD  Pulmonary and Critical Care Medicine

## 2023-11-15 LAB
ALBUMIN SERPL BCP-MCNC: 2.2 G/DL (ref 3.2–4.9)
ALBUMIN/GLOB SERPL: 0.8 G/DL
ALP SERPL-CCNC: 664 U/L (ref 30–99)
ALT SERPL-CCNC: 152 U/L (ref 2–50)
ANION GAP SERPL CALC-SCNC: 10 MMOL/L (ref 7–16)
AST SERPL-CCNC: 148 U/L (ref 12–45)
BASOPHILS # BLD AUTO: 0.4 % (ref 0–1.8)
BASOPHILS # BLD: 0.04 K/UL (ref 0–0.12)
BILIRUB SERPL-MCNC: 7.6 MG/DL (ref 0.1–1.5)
BUN SERPL-MCNC: 38 MG/DL (ref 8–22)
CALCIUM ALBUM COR SERPL-MCNC: 9.2 MG/DL (ref 8.5–10.5)
CALCIUM SERPL-MCNC: 7.8 MG/DL (ref 8.5–10.5)
CHLORIDE SERPL-SCNC: 101 MMOL/L (ref 96–112)
CO2 SERPL-SCNC: 19 MMOL/L (ref 20–33)
CREAT SERPL-MCNC: 1.16 MG/DL (ref 0.5–1.4)
EOSINOPHIL # BLD AUTO: 0.09 K/UL (ref 0–0.51)
EOSINOPHIL NFR BLD: 1 % (ref 0–6.9)
ERYTHROCYTE [DISTWIDTH] IN BLOOD BY AUTOMATED COUNT: 53 FL (ref 35.9–50)
GFR SERPLBLD CREATININE-BSD FMLA CKD-EPI: 69 ML/MIN/1.73 M 2
GLOBULIN SER CALC-MCNC: 2.9 G/DL (ref 1.9–3.5)
GLUCOSE SERPL-MCNC: 119 MG/DL (ref 65–99)
HCT VFR BLD AUTO: 33.2 % (ref 42–52)
HGB BLD-MCNC: 11.4 G/DL (ref 14–18)
IMM GRANULOCYTES # BLD AUTO: 0.11 K/UL (ref 0–0.11)
IMM GRANULOCYTES NFR BLD AUTO: 1.2 % (ref 0–0.9)
LYMPHOCYTES # BLD AUTO: 0.9 K/UL (ref 1–4.8)
LYMPHOCYTES NFR BLD: 9.9 % (ref 22–41)
MAGNESIUM SERPL-MCNC: 2.3 MG/DL (ref 1.5–2.5)
MCH RBC QN AUTO: 32 PG (ref 27–33)
MCHC RBC AUTO-ENTMCNC: 34.3 G/DL (ref 32.3–36.5)
MCV RBC AUTO: 93.3 FL (ref 81.4–97.8)
MONOCYTES # BLD AUTO: 0.7 K/UL (ref 0–0.85)
MONOCYTES NFR BLD AUTO: 7.7 % (ref 0–13.4)
NEUTROPHILS # BLD AUTO: 7.21 K/UL (ref 1.82–7.42)
NEUTROPHILS NFR BLD: 79.8 % (ref 44–72)
NRBC # BLD AUTO: 0 K/UL
NRBC BLD-RTO: 0 /100 WBC (ref 0–0.2)
PHOSPHATE SERPL-MCNC: 2.9 MG/DL (ref 2.5–4.5)
PLATELET # BLD AUTO: 134 K/UL (ref 164–446)
PMV BLD AUTO: 9.8 FL (ref 9–12.9)
POTASSIUM SERPL-SCNC: 4.3 MMOL/L (ref 3.6–5.5)
PROT SERPL-MCNC: 5.1 G/DL (ref 6–8.2)
RBC # BLD AUTO: 3.56 M/UL (ref 4.7–6.1)
SODIUM SERPL-SCNC: 130 MMOL/L (ref 135–145)
UFH PPP CHRO-ACNC: 0.45 IU/ML
UFH PPP CHRO-ACNC: 0.53 IU/ML
WBC # BLD AUTO: 9.1 K/UL (ref 4.8–10.8)

## 2023-11-15 PROCEDURE — 700102 HCHG RX REV CODE 250 W/ 637 OVERRIDE(OP): Performed by: HOSPITALIST

## 2023-11-15 PROCEDURE — 85025 COMPLETE CBC W/AUTO DIFF WBC: CPT

## 2023-11-15 PROCEDURE — A9270 NON-COVERED ITEM OR SERVICE: HCPCS

## 2023-11-15 PROCEDURE — 97166 OT EVAL MOD COMPLEX 45 MIN: CPT

## 2023-11-15 PROCEDURE — 700111 HCHG RX REV CODE 636 W/ 250 OVERRIDE (IP): Performed by: EMERGENCY MEDICINE

## 2023-11-15 PROCEDURE — 700102 HCHG RX REV CODE 250 W/ 637 OVERRIDE(OP)

## 2023-11-15 PROCEDURE — A9270 NON-COVERED ITEM OR SERVICE: HCPCS | Performed by: HOSPITALIST

## 2023-11-15 PROCEDURE — 99232 SBSQ HOSP IP/OBS MODERATE 35: CPT | Performed by: NURSE PRACTITIONER

## 2023-11-15 PROCEDURE — 700111 HCHG RX REV CODE 636 W/ 250 OVERRIDE (IP): Performed by: INTERNAL MEDICINE

## 2023-11-15 PROCEDURE — 80053 COMPREHEN METABOLIC PANEL: CPT

## 2023-11-15 PROCEDURE — 83735 ASSAY OF MAGNESIUM: CPT

## 2023-11-15 PROCEDURE — A9270 NON-COVERED ITEM OR SERVICE: HCPCS | Performed by: EMERGENCY MEDICINE

## 2023-11-15 PROCEDURE — 85520 HEPARIN ASSAY: CPT

## 2023-11-15 PROCEDURE — 99291 CRITICAL CARE FIRST HOUR: CPT | Performed by: INTERNAL MEDICINE

## 2023-11-15 PROCEDURE — 770022 HCHG ROOM/CARE - ICU (200)

## 2023-11-15 PROCEDURE — 700102 HCHG RX REV CODE 250 W/ 637 OVERRIDE(OP): Performed by: EMERGENCY MEDICINE

## 2023-11-15 PROCEDURE — 700111 HCHG RX REV CODE 636 W/ 250 OVERRIDE (IP): Performed by: RADIOLOGY

## 2023-11-15 PROCEDURE — 84100 ASSAY OF PHOSPHORUS: CPT

## 2023-11-15 RX ORDER — POLYETHYLENE GLYCOL 3350 17 G/17G
1 POWDER, FOR SOLUTION ORAL
Status: DISCONTINUED | OUTPATIENT
Start: 2023-11-15 | End: 2023-11-21 | Stop reason: HOSPADM

## 2023-11-15 RX ORDER — MIDODRINE HYDROCHLORIDE 5 MG/1
5 TABLET ORAL
Status: DISCONTINUED | OUTPATIENT
Start: 2023-11-15 | End: 2023-11-18

## 2023-11-15 RX ORDER — BISACODYL 10 MG
10 SUPPOSITORY, RECTAL RECTAL
Status: DISCONTINUED | OUTPATIENT
Start: 2023-11-15 | End: 2023-11-15

## 2023-11-15 RX ORDER — POLYETHYLENE GLYCOL 3350 17 G/17G
1 POWDER, FOR SOLUTION ORAL
Status: DISCONTINUED | OUTPATIENT
Start: 2023-11-15 | End: 2023-11-15

## 2023-11-15 RX ORDER — AMOXICILLIN 250 MG
2 CAPSULE ORAL 2 TIMES DAILY
Status: DISCONTINUED | OUTPATIENT
Start: 2023-11-15 | End: 2023-11-15

## 2023-11-15 RX ORDER — AMOXICILLIN 250 MG
2 CAPSULE ORAL 2 TIMES DAILY
Status: DISCONTINUED | OUTPATIENT
Start: 2023-11-15 | End: 2023-11-21 | Stop reason: HOSPADM

## 2023-11-15 RX ORDER — BISACODYL 10 MG
10 SUPPOSITORY, RECTAL RECTAL
Status: DISCONTINUED | OUTPATIENT
Start: 2023-11-15 | End: 2023-11-21 | Stop reason: HOSPADM

## 2023-11-15 RX ADMIN — HEPARIN SODIUM 12 UNITS/KG/HR: 5000 INJECTION, SOLUTION INTRAVENOUS at 04:56

## 2023-11-15 RX ADMIN — OXYCODONE HYDROCHLORIDE 10 MG: 10 TABLET ORAL at 02:26

## 2023-11-15 RX ADMIN — OXYCODONE HYDROCHLORIDE 10 MG: 10 TABLET, FILM COATED, EXTENDED RELEASE ORAL at 05:59

## 2023-11-15 RX ADMIN — LORAZEPAM 0.5 MG: 2 INJECTION INTRAMUSCULAR; INTRAVENOUS at 19:33

## 2023-11-15 RX ADMIN — OMEPRAZOLE 20 MG: 20 CAPSULE, DELAYED RELEASE ORAL at 05:59

## 2023-11-15 RX ADMIN — MIDODRINE HYDROCHLORIDE 5 MG: 5 TABLET ORAL at 17:35

## 2023-11-15 RX ADMIN — OXYCODONE HYDROCHLORIDE 10 MG: 10 TABLET, FILM COATED, EXTENDED RELEASE ORAL at 17:35

## 2023-11-15 RX ADMIN — OXYCODONE 5 MG: 5 TABLET ORAL at 16:16

## 2023-11-15 RX ADMIN — BUPROPION HYDROCHLORIDE 150 MG: 150 TABLET, EXTENDED RELEASE ORAL at 05:59

## 2023-11-15 RX ADMIN — HYDROMORPHONE HYDROCHLORIDE 0.25 MG: 1 INJECTION, SOLUTION INTRAMUSCULAR; INTRAVENOUS; SUBCUTANEOUS at 03:39

## 2023-11-15 RX ADMIN — DOCUSATE SODIUM 50 MG AND SENNOSIDES 8.6 MG 2 TABLET: 8.6; 5 TABLET, FILM COATED ORAL at 17:35

## 2023-11-15 RX ADMIN — MIDODRINE HYDROCHLORIDE 5 MG: 5 TABLET ORAL at 11:56

## 2023-11-15 ASSESSMENT — ENCOUNTER SYMPTOMS
PALPITATIONS: 0
CHILLS: 0
HEADACHES: 0
BLURRED VISION: 0
CONSTIPATION: 1
SINUS PAIN: 0
ABDOMINAL PAIN: 0
COUGH: 0
FOCAL WEAKNESS: 0
VOMITING: 0
MYALGIAS: 0
SHORTNESS OF BREATH: 0
SPEECH CHANGE: 0
DIARRHEA: 0
BLOOD IN STOOL: 0
NERVOUS/ANXIOUS: 0
WEAKNESS: 1
NAUSEA: 0
HEARTBURN: 0
DIZZINESS: 0
BACK PAIN: 1
DEPRESSION: 0
SHORTNESS OF BREATH: 1
WHEEZING: 0
FEVER: 0

## 2023-11-15 ASSESSMENT — COGNITIVE AND FUNCTIONAL STATUS - GENERAL
HELP NEEDED FOR BATHING: A LITTLE
DRESSING REGULAR LOWER BODY CLOTHING: A LITTLE
TOILETING: A LITTLE
DAILY ACTIVITIY SCORE: 20
DRESSING REGULAR UPPER BODY CLOTHING: A LITTLE
SUGGESTED CMS G CODE MODIFIER DAILY ACTIVITY: CJ

## 2023-11-15 ASSESSMENT — PAIN DESCRIPTION - PAIN TYPE
TYPE: ACUTE PAIN

## 2023-11-15 ASSESSMENT — ACTIVITIES OF DAILY LIVING (ADL): TOILETING: INDEPENDENT

## 2023-11-15 NOTE — THERAPY
"Occupational Therapy   Initial Evaluation     Patient Name: Adam Yan  Age:  68 y.o., Sex:  male  Medical Record #: [id number]  Today's Date: 11/15/2023     Precautions  Precautions: (P) Fall Risk    Assessment  Patient is 68 y.o. male who presents to acute w/ increasing weakness, fatigue, and jaundice. PMH includes pancreatic adenocarcinoma stage IV and HTN. Pt demo'd BADLs and functional mobility at [facility] level. Pt primarily limited during session by impaired activity tolerance. At this time recommend placement, however, anticipate quick progression while in house. Will continue to follow.     Plan    Occupational Therapy Initial Treatment Plan   Treatment Interventions: (P) Self Care / Activities of Daily Living, Neuro Re-Education / Balance, Therapeutic Exercises, Therapeutic Activity, Adaptive Equipment  Treatment Frequency: (P) 3 Times per Week  Duration: (P) Until Therapy Goals Met    DC Equipment Recommendations: (P) Unable to determine at this time  Discharge Recommendations: (P) Recommend post-acute placement for additional occupational therapy services prior to discharge home (however, anticipate quick progression while in house to home w/ HH)     Subjective    \"I usually work with mortgages\"      Objective       11/15/23 1501   Charge Group   OT Evaluation OT Evaluation Mod   Total Time Spent   OT Evaluation (Minutes) 25   Initial Contact Note    Initial Contact Note Order Received and Verified, Occupational Therapy Evaluation in Progress with Full Report to Follow.   Prior Living Situation   Prior Services None   Housing / Facility [address]   Bathroom Set up Walk In Shower   Equipment Owned None   Lives with - Patient's Self Care Capacity Spouse   Comments Spouse present and very supportive, works part time. Reports that dtr is staying with them for the next few weeks. Live in [place]   Prior Level of ADL Function   Self Feeding Independent   Grooming / Hygiene Independent   Bathing " Independent   Dressing Independent   Toileting Independent   Prior Level of IADL Function   Medication Management Independent   Laundry Independent   Kitchen Mobility Independent   Finances Independent   Home Management Independent   Shopping Independent   Prior Level Of Mobility Independent Without Device in Community;Independent Without Device in Home   Driving / Transportation Driving Independent   Occupation (Pre-Hospital Vocational)   (semi retired, works with mortagages)   Precautions   Precautions Fall Risk   Vitals   O2 (LPM) 1  (as found)   O2 Delivery Device Silicone Nasal Cannula   Pain 0 - 10 Group   Therapist Pain Assessment Post Activity Pain Same as Prior to Activity;Nurse Notified  (no c/o pain during session)   Cognition    Cognition / Consciousness WDL   Level of Consciousness Alert   Comments pleasent, cooperative, motivated, stoic   Active ROM Upper Body   Active ROM Upper Body  WDL   Strength Upper Body   Upper Body Strength  WDL   Coordination Upper Body   Coordination WDL   Balance Assessment   Sitting Balance (Static) Fair   Sitting Balance (Dynamic) Fair   Standing Balance (Static) Fair -   Standing Balance (Dynamic) Fair -   Weight Shift Sitting Fair   Weight Shift Standing Fair   Comments w/ FWW   Bed Mobility    Supine to Sit Minimal Assist   Sit to Supine Supervised   Scooting Standby Assist   ADL Assessment   Grooming Supervision;Seated  (CGA in attempt at standing, limited by activity tolerance)   Upper Body Dressing Supervision   Lower Body Dressing Contact Guard Assist  (socks, cues for pacing)   How much help from another person does the patient currently need...   Putting on and taking off regular lower body clothing? 3   Bathing (including washing, rinsing, and drying)? 3   Toileting, which includes using a toilet, bedpan, or urinal? 3   Putting on and taking off regular upper body clothing? 3   Taking care of personal grooming such as brushing teeth? 4   Eating meals? 4   6  Clicks Daily Activity Score 20   Functional Mobility   Sit to Stand Contact Guard Assist   Bed, Chair, Wheelchair Transfer Contact Guard Assist   Mobility bathroom distance, 3x w/ FWW   Activity Tolerance   Sitting in Chair NT  (had just gotten back to bed ~1 hour ago)   Sitting Edge of Bed 15 min   Standing 5 min total   Patient / Family Goals   Patient / Family Goal #1 To go home   Short Term Goals   Short Term Goal # 1 Pt will demo LB dressing w/ SPV   Short Term Goal # 2 Pt will demo standing grooming w/ SPV   Short Term Goal # 3 Pt will demo toilet txf w/ SPV   Education Group   Role of Occupational Therapist Patient Response Patient;Acceptance;Explanation;Demonstration;Action Demonstration;Verbal Demonstration;Family   Energy Conservation Patient Response Patient;Family;Acceptance;Explanation;Demonstration;Verbal Demonstration;Action Demonstration   ADL Patient Response Patient;Family;Acceptance;Explanation;Demonstration;Verbal Demonstration   Occupational Therapy Initial Treatment Plan    Treatment Interventions Self Care / Activities of Daily Living;Neuro Re-Education / Balance;Therapeutic Exercises;Therapeutic Activity;Adaptive Equipment   Treatment Frequency 3 Times per Week   Duration Until Therapy Goals Met   Problem List   Problem List Decreased Active Daily Living Skills;Decreased Homemaking Skills;Decreased Functional Mobility;Decreased Activity Tolerance;Impaired Postural Control / Balance   Anticipated Discharge Equipment and Recommendations   DC Equipment Recommendations Unable to determine at this time   Discharge Recommendations Recommend post-acute placement for additional occupational therapy services prior to discharge home  (however, anticipate quick progression while in house to home w/ HH)   Interdisciplinary Plan of Care Collaboration   IDT Collaboration with  Nursing   Patient Position at End of Therapy In Bed;Call Light within Reach;Tray Table within Reach;Phone within Reach;Family /  Friend in Room   Collaboration Comments report given   Session Information   Date / Session Number  11/15, 1 (1/3, 11/21)

## 2023-11-15 NOTE — PROGRESS NOTES
..Gastroenterology Progress Note               Author:  DOMINIQUE Corea   Date & Time Created: 11/14/2023 4:25 PM       Patient ID:  Name:             Adam Yan  YOB: 1955  Age:                 68 y.o.  male  MRN:               [id number]        Medical Decision Making, by Problem:  Active Hospital Problems    Diagnosis     Right ventricular failure (HCC) [I50.810]     Acute pulmonary embolism with acute cor pulmonale (HCC) [I26.09]     Biliary obstruction [K83.1]     Pancreatic adenocarcinoma (HCC) [C25.9]     Acute kidney injury (HCC) [N17.9]     Shock (HCC) [R57.9]     Goals of care, counseling/discussion [Z71.89]     DVT (deep venous thrombosis) (HCC) [I82.409]     Primary hypertension [I10]            Presenting Chief Complaint:  Obstructive jaundice from pancreatic cancer    HPI:  Adam Yan is a 68 y.o. male with a pmhx of HTN, GERD, gout who presented 11/11/2023 with jaundice, poor oral intake and malaise.  The patient and his wife report approximately 1 month diagnosis of a pancreatic mass and lung metastasis.  He underwent a EGD/EUS on 10/31/2023 by Dr. Heredia at [place] for FNA of this mass and unfortunately the pathology has returned 3 days ago with adenocarcinoma.                                                                                Impression:            - Normal esophagus.                        - Normal stomach.                        - Erythematous duodenopathy.                        - There was diffuse abnormal echotexture in the visualized portion of the liver. This was characterized by a heterogenous appearance and a lobulated appearance.    Ascites was found on endosonographic examination of the peritoneal cavity.                        - A mass was identified in the uncinate process of the pancreas. However, the endosonographic appearance is suggestive of benign inflammatory changes. Fine needle biopsy performed.   There was dilation in the  common bile duct which measured up to 10 mm.       Given the pulmonary metastasis this would stage him to stage IV pancreatic adenocarcinoma, they have a follow-up appointment with his oncologist Dr. Hatch at [facility] next week.  Over the past week the patient's wife states he has been progressively weaker mostly staying in bed with the exception of going to the bathroom.  He has had incredibly poor oral intake and they have been trying bone broths and boost to increase his caloric intake.  He presented to the ER where he was found to have a fairly unremarkable CBC, hyponatremia at 124, ADAM with a creatinine of 1.63, BUN 45, AST and ALT elevations at 110 and 150 respectively and an alkaline phosphatase of 788.  In addition to this his total bilirubin was noted at 12.2.  Other than his weakness, malaise and inability to eat he has noted some acid epigastric pain which does radiate to his back.  He denies any bowel or bladder changes, he has had some increasing dyspnea however no chest pain.  Given the abruptness of this symptoms onset and the acuity of his diagnosis he has not had the opportunity to discuss treatment plans or goals of care with his oncology team.          Interval History:  11/13/2023: Patient seen this morning.  Having increased tachycardia and shortness of breath.  Underwent CTA and found to have numerous pulmonary emboli.  Went to IR and underwent EKOS.    11/14/2023: Patient seen at bedside with son.  He reports some dyspnea but is significantly improved post EKOS.  He reports meeting with palliative care as well as oncology and that he is still thinking about how he would like to proceed with care.  Weaning vasopressors.    Hospital Medications:  Current Facility-Administered Medications   Medication Dose Frequency Provider Last Rate Last Admin    LORazepam (Ativan) injection 0.5 mg  0.5 mg Q4HRS PRN Cosme Traylor M.D.   0.5 mg at 11/13/23 2058    heparin infusion 25,000 units in 500 mL  0.45% NACL  0-30 Units/kg/hr Continuous Misha Kirby M.D. 28 mL/hr at 11/14/23 1542 14 Units/kg/hr at 11/14/23 1542    heparin injection 4,000 Units  40 Units/kg PRN Misha Kirby M.D.   4,000 Units at 11/13/23 2108    oxyCODONE CR (OxyCONTIN) tablet 10 mg  10 mg Q12HRS Real Green M.D.   10 mg at 11/14/23 0602    HYDROmorphone (Dilaudid) injection 0.25 mg  0.25 mg Q HOUR PRN Real Green M.D.   0.25 mg at 11/13/23 1828    MD Alert...ICU Electrolyte Replacement per Pharmacy   PHARMACY TO DOSE Cosme Traylor M.D.        omeprazole (PriLOSEC) capsule 20 mg  20 mg DAILY Vinnie Matias M.D.   20 mg at 11/14/23 0602    buPROPion SR (Wellbutrin-SR) tablet 150 mg  150 mg QAM Vinnie Matias M.D.   150 mg at 11/14/23 0602    oxyCODONE immediate-release (Roxicodone) tablet 5 mg  5 mg Q3HRS PRN Vinnie Matias M.D.   5 mg at 11/14/23 1206    Or    oxyCODONE immediate release (Roxicodone) tablet 10 mg  10 mg Q3HRS PRN Vinnie Matias M.D.   10 mg at 11/11/23 2137    labetalol (Normodyne/Trandate) injection 10 mg  10 mg Q4HRS PRN Vinnie Matias M.D.        ondansetron (Zofran) syringe/vial injection 4 mg  4 mg Q4HRS PRN Vinnie Matias M.D.        ondansetron (Zofran ODT) dispertab 4 mg  4 mg Q4HRS PRN Vinnie Matias M.D.        hydrOXYzine HCl (Atarax) tablet 25 mg  25 mg TID PRN Vinnie Matias M.D.   25 mg at 11/11/23 2026    norepinephrine (Levophed) 8 mg in 250 mL NS infusion (premix)  0-1 mcg/kg/min (Ideal) Continuous Cristino Sosa Jr., D.O. 15 mL/hr at 11/14/23 0253 0.1 mcg/kg/min at 11/14/23 0253   Last reviewed on 11/11/2023  7:14 PM by Alayna Hernandez LALO       Review of Systems:  Review of Systems   Constitutional:  Positive for malaise/fatigue. Negative for chills and fever.   HENT:  Negative for congestion, hearing loss and sinus pain.    Eyes:  Negative for blurred vision.   Respiratory:  Positive for shortness of breath. Negative for cough.    Cardiovascular:  Negative for chest pain and leg  "swelling.   Gastrointestinal:  Positive for constipation. Negative for abdominal pain, blood in stool, diarrhea, heartburn, melena, nausea and vomiting.   Genitourinary:  Negative for dysuria.   Musculoskeletal:  Positive for back pain. Negative for myalgias.   Skin:  Negative for rash.   Neurological:  Positive for weakness. Negative for dizziness.   Psychiatric/Behavioral:  Negative for depression. The patient is not nervous/anxious.    All other systems reviewed and are negative.        Vital signs:  Weight/BMI: Body mass index is 29.14 kg/m².  BP 91/55   Pulse 70   Temp 35.9 °C (96.6 °F) (Temporal)   Resp (!) 23   Ht 1.854 m (6' 1\")   Wt 100 kg (220 lb 14.4 oz)   SpO2 95%   Vitals:    11/14/23 1415 11/14/23 1430 11/14/23 1445 11/14/23 1500   BP: 94/56 97/57  91/55   Pulse: 73 70 72 70   Resp: (!) 22 (!) 21 (!) 21 (!) 23   Temp:       TempSrc:       SpO2: 95% 94% 95% 95%   Weight:       Height:         Oxygen Therapy:  Pulse Oximetry: 95 %, O2 (LPM): 3, O2 Delivery Device: Silicone Nasal Cannula    Intake/Output Summary (Last 24 hours) at 11/14/2023 1625  Last data filed at 11/14/2023 0800  Gross per 24 hour   Intake 1285.34 ml   Output 900 ml   Net 385.34 ml           Physical Exam:  Physical Exam  Vitals and nursing note reviewed.   Constitutional:       General: He is not in acute distress.     Appearance: He is ill-appearing.   HENT:      Head: Normocephalic and atraumatic.      Nose: Nose normal. No congestion.      Mouth/Throat:      Mouth: Mucous membranes are moist.      Pharynx: Oropharynx is clear.   Eyes:      General: Scleral icterus present.      Extraocular Movements: Extraocular movements intact.      Conjunctiva/sclera: Conjunctivae normal.   Cardiovascular:      Rate and Rhythm: Regular rhythm. Tachycardia present.      Pulses: Normal pulses.      Heart sounds: Normal heart sounds. No murmur heard.  Pulmonary:      Effort: Pulmonary effort is normal. No respiratory distress.      Breath " sounds: Normal breath sounds.   Abdominal:      General: Abdomen is flat. Bowel sounds are normal. There is distension.      Palpations: Abdomen is soft.      Tenderness: There is no abdominal tenderness. There is no guarding.   Musculoskeletal:         General: Normal range of motion.      Cervical back: Normal range of motion and neck supple.      Right lower leg: No edema.      Left lower leg: No edema.   Skin:     General: Skin is warm and dry.      Capillary Refill: Capillary refill takes less than 2 seconds.      Coloration: Skin is jaundiced.   Neurological:      Mental Status: He is alert and oriented to person, place, and time.      Motor: Weakness present.   Psychiatric:         Mood and Affect: Mood normal.         Behavior: Behavior normal.             Labs:  Recent Labs     11/12/23  0600 11/13/23  0525 11/14/23  0610   SODIUM 127* 127* 130*   POTASSIUM 4.8 4.8 4.7   CHLORIDE 96 96 100   CO2 17* 16* 16*   BUN 47* 44* 43*   CREATININE 1.49* 1.35 1.30   MAGNESIUM  --  2.1 2.2   PHOSPHORUS  --  4.0 4.3   CALCIUM 7.9* 8.1* 7.8*       Recent Labs     11/12/23  0600 11/13/23  0525 11/14/23  0610   ALTSGPT 130* 133* 131*   ASTSGOT 90* 101* 108*   ALKPHOSPHAT 709* 676* 668*   TBILIRUBIN 10.8* 9.8* 7.8*   GLUCOSE 144* 140* 135*       Recent Labs     11/12/23  0600 11/13/23  0525 11/13/23  1935 11/14/23  0330 11/14/23  0610   WBC 10.6 10.8 10.8 11.9*  --    NEUTSPOLYS 75.30* 80.30*  --  79.30*  --    LYMPHOCYTES 12.40* 8.40*  --  9.50*  --    MONOCYTES 9.50 8.50  --  8.80  --    EOSINOPHILS 0.60 0.40  --  0.30  --    BASOPHILS 0.70 0.50  --  0.70  --    ASTSGOT 90* 101*  --   --  108*   ALTSGPT 130* 133*  --   --  131*   ALKPHOSPHAT 709* 676*  --   --  668*   TBILIRUBIN 10.8* 9.8*  --   --  7.8*       Recent Labs     11/13/23  0525 11/13/23  0736 11/13/23  1030 11/13/23  1935 11/14/23  0330   RBC 3.96*  --   --  3.90* 3.90*   HEMOGLOBIN 13.0*  --   --  12.6* 12.7*   HEMATOCRIT 36.5*  --   --  37.0* 36.7*    PLATELETCT 120*  --   --  136* 74*   PROTHROMBTM  --  20.7* 21.1* 22.7*  --    APTT  --  29.8 28.7 52.3*  --    INR  --  1.75* 1.79* 1.97*  --        Recent Results (from the past 24 hour(s))   APTT    Collection Time: 11/13/23  7:35 PM   Result Value Ref Range    APTT 52.3 (H) 24.7 - 36.0 sec   Prothrombin Time    Collection Time: 11/13/23  7:35 PM   Result Value Ref Range    PT 22.7 (H) 12.0 - 14.6 sec    INR 1.97 (H) 0.87 - 1.13   Heparin Xa (Unfractionated)    Collection Time: 11/13/23  7:35 PM   Result Value Ref Range    Heparin Xa (UFH) 0.10 IU/mL   CBC WITHOUT DIFFERENTIAL    Collection Time: 11/13/23  7:35 PM   Result Value Ref Range    WBC 10.8 4.8 - 10.8 K/uL    RBC 3.90 (L) 4.70 - 6.10 M/uL    Hemoglobin 12.6 (L) 14.0 - 18.0 g/dL    Hematocrit 37.0 (L) 42.0 - 52.0 %    MCV 94.9 81.4 - 97.8 fL    MCH 32.3 27.0 - 33.0 pg    MCHC 34.1 32.3 - 36.5 g/dL    RDW 53.0 (H) 35.9 - 50.0 fL    Platelet Count 136 (L) 164 - 446 K/uL    MPV 9.7 9.0 - 12.9 fL   CBC WITH DIFFERENTIAL    Collection Time: 11/14/23  3:30 AM   Result Value Ref Range    WBC 11.9 (H) 4.8 - 10.8 K/uL    RBC 3.90 (L) 4.70 - 6.10 M/uL    Hemoglobin 12.7 (L) 14.0 - 18.0 g/dL    Hematocrit 36.7 (L) 42.0 - 52.0 %    MCV 94.1 81.4 - 97.8 fL    MCH 32.6 27.0 - 33.0 pg    MCHC 34.6 32.3 - 36.5 g/dL    RDW 53.8 (H) 35.9 - 50.0 fL    Platelet Count 74 (L) 164 - 446 K/uL    MPV 11.3 9.0 - 12.9 fL    Neutrophils-Polys 79.30 (H) 44.00 - 72.00 %    Lymphocytes 9.50 (L) 22.00 - 41.00 %    Monocytes 8.80 0.00 - 13.40 %    Eosinophils 0.30 0.00 - 6.90 %    Basophils 0.70 0.00 - 1.80 %    Immature Granulocytes 1.40 (H) 0.00 - 0.90 %    Nucleated RBC 0.00 0.00 - 0.20 /100 WBC    Neutrophils (Absolute) 9.40 (H) 1.82 - 7.42 K/uL    Lymphs (Absolute) 1.13 1.00 - 4.80 K/uL    Monos (Absolute) 1.04 (H) 0.00 - 0.85 K/uL    Eos (Absolute) 0.04 0.00 - 0.51 K/uL    Baso (Absolute) 0.08 0.00 - 0.12 K/uL    Immature Granulocytes (abs) 0.16 (H) 0.00 - 0.11 K/uL    NRBC  (Absolute) 0.00 K/uL   Heparin Anti-Xa    Collection Time: 11/14/23  3:30 AM   Result Value Ref Range    Heparin Xa (UFH) 0.72 IU/mL   IMMATURE PLT FRACTION    Collection Time: 11/14/23  3:30 AM   Result Value Ref Range    Imm. Plt Fraction 2.6 0.6 - 13.1 %   Comp Metabolic Panel    Collection Time: 11/14/23  6:10 AM   Result Value Ref Range    Sodium 130 (L) 135 - 145 mmol/L    Potassium 4.7 3.6 - 5.5 mmol/L    Chloride 100 96 - 112 mmol/L    Co2 16 (L) 20 - 33 mmol/L    Anion Gap 14.0 7.0 - 16.0    Glucose 135 (H) 65 - 99 mg/dL    Bun 43 (H) 8 - 22 mg/dL    Creatinine 1.30 0.50 - 1.40 mg/dL    Calcium 7.8 (L) 8.5 - 10.5 mg/dL    Correct Calcium 9.1 8.5 - 10.5 mg/dL    AST(SGOT) 108 (H) 12 - 45 U/L    ALT(SGPT) 131 (H) 2 - 50 U/L    Alkaline Phosphatase 668 (H) 30 - 99 U/L    Total Bilirubin 7.8 (H) 0.1 - 1.5 mg/dL    Albumin 2.4 (L) 3.2 - 4.9 g/dL    Total Protein 5.2 (L) 6.0 - 8.2 g/dL    Globulin 2.8 1.9 - 3.5 g/dL    A-G Ratio 0.9 g/dL   MAGNESIUM    Collection Time: 11/14/23  6:10 AM   Result Value Ref Range    Magnesium 2.2 1.5 - 2.5 mg/dL   PHOSPHORUS    Collection Time: 11/14/23  6:10 AM   Result Value Ref Range    Phosphorus 4.3 2.5 - 4.5 mg/dL   ESTIMATED GFR    Collection Time: 11/14/23  6:10 AM   Result Value Ref Range    GFR (CKD-EPI) 60 >60 mL/min/1.73 m 2   Heparin Xa (Unfractionated)    Collection Time: 11/14/23  1:10 PM   Result Value Ref Range    Heparin Xa (UFH) 0.93 IU/mL       Radiology Review:  IR-PULMONARY ARTERY-R-L SELECTIVE RIGHT   Final Result      1.  Pulmonary hypertension as recorded above.   2.  BILATERAL selective pulmonary arteriograms most notable for large distal right main pulmonary artery clot filling defect concordant with CTA findings.   3.  Placement of BILATERAL EKOS catheters   4.  Initiation of bilateral thrombolytic infusion with EKOS acoustic pulse thrombolysis therapy (US-assisted thrombolysis) for duration 4 hours      US-EXTREMITY VENOUS LOWER BILAT   Final Result       CT-CTA CHEST PULMONARY ARTERY W/ RECONS   Final Result      1.  Extensive bilateral pulmonary emboli involving the main pulmonary arteries, segmental, and subsegmental branches. RV strain noted.      2.  Diffuse metastatic disease throughout the lung fields.      3.  Confluent masslike infiltrates in the periphery of the lung fields bilaterally consistent with metastatic disease and/or pneumonitis.      4.  Mild to moderate bilateral pleural effusions.            EC-ECHOCARDIOGRAM COMPLETE W/ CONT   Final Result      CT-ABDOMEN-PELVIS WITH   Final Result         1. 3 cm ill-defined pancreatic mass, which could represent pancreatic adenocarcinoma or cholangiocarcinoma. There is encasement of the adjacent portal, splenic, and mesenteric veins. There is common bile duct dilation above the level of the lesion.   2. There are several peritoneal nodules, largest measuring 4.1 cm. There is also ascites. Therefore, consistent with peritoneal carcinomatosis.   3. Innumerable lower lung zone nodules, which could represent metastatic disease or a benign process.   4. Liver appears cirrhotic.   5. Density within the gallbladder, as above.   6. Simple small right renal cyst, which does not require follow-up.      DX-CHEST-PORTABLE (1 VIEW)   Final Result      Hypoinflation with prominent interstitial and numerous small ill-defined pulmonary nodules bilaterally may indicate granulomatous disease, pneumonitis or neoplastic process.            MDM (Data Review):   -Records reviewed and summarized in current documentation  -I personally reviewed and interpreted the laboratory results  -I personally reviewed the radiology images    Assessment/Recommendations:  IMPRESSION/PLAN:  Pancreatic adenocarcinoma - metastatic to lung, ascites present-palliative care involved  Obstructive jaundice due to pancreatitic mass  Cholangitis - on antibiotics   Pulmonary emboli - status post EKOS     MDM:  This is a 60-year-old male with a past  medical history as listed above who presented to [facility] on 11/13/2023 with worsening weakness and shortness of breath.  Initial plans for biliary stent placement were canceled due to pulmonary embolisms.  Patient underwent EKOS on 11/13/2023.  Post procedurally, patient reports dyspnea has improved.  Palliative care and oncology also evaluated patient.  Oncology signed off.  Patient thinking about if he would like to undergo stent placement.  Discussed with patient that would like an additional 2-3 days for him to recover/stabilized from EKOS prior to procedure should he decide to proceed.  Patient and sons at bedside verbalized understanding of this.      Plan:  Needs ERCP with biliary stent placement-decision/timing to be determined  Diet per primary team  Will reassess daily to determine timing of endoscopic intervention    Discussed with patient and his family, Dr. Scott, Dr. Traylor    Core Quality Measures   Reviewed items::  Labs, Medications and Radiology reports reviewed

## 2023-11-15 NOTE — CARE PLAN
The patient is Stable - Low risk of patient condition declining or worsening    Shift Goals  Clinical Goals: Hemodynamic stability  Patient Goals: Sleep  Family Goals: Sleep    Progress made toward(s) clinical / shift goals:    Problem: Knowledge Deficit - Standard  Goal: Patient and family/care givers will demonstrate understanding of plan of care, disease process/condition, diagnostic tests and medications  Description: Target End Date:  1-3 days or as soon as patient condition allows    Document in Patient Education    1.  Patient and family/caregiver oriented to unit, equipment, visitation policy and means for communicating concern  2.  Complete/review Learning Assessment  3.  Assess knowledge level of disease process/condition, treatment plan, diagnostic tests and medications  4.  Explain disease process/condition, treatment plan, diagnostic tests and medications  Outcome: Progressing  Note: Educated on interventions      Problem: Hemodynamics  Goal: Patient's hemodynamics, fluid balance and neurologic status will be stable or improve  Description: Target End Date:  Prior to discharge or change in level of care    Document on Assessment and I/O flowsheet templates    1.  Monitor vital signs, pulse oximetry and cardiac monitor per provider order and/or policy  2.  Maintain blood pressure per provider order  3.  Hemodynamic monitoring per provider order  4.  Manage IV fluids and IV infusions  5.  Monitor intake and output  6.  Daily weights per unit policy or provider order  7.  Assess peripheral pulses and capillary refill  8.  Assess color and body temperature  9.  Position patient for maximum circulation/cardiac output  10. Monitor for signs/symptoms of excessive bleeding  11. Assess mental status, restlessness and changes in level of consciousness  12. Monitor temperature and report fever or hypothermia to provider immediately. Consideration of targeted temperature management.  Outcome: Progressing  Note:  Actively titrating down on levophed to parameters     Problem: Respiratory  Goal: Patient will achieve/maintain optimum respiratory ventilation and gas exchange  Description: Target End Date:  Prior to discharge or change in level of care    Document on Assessment flowsheet    1.  Assess and monitor rate, rhythm, depth and effort of respiration  2.  Breath sounds assessed qshift and/or as needed  3.  Assess O2 saturation, administer/titrate oxygen as ordered  4.  Position patient for maximum ventilatory efficiency  5.  Turn, cough, and deep breath with splinting to improve effectiveness  6.  Collaborate with RT to administer medication/treatments per order  7.  Encourage use of incentive spirometer and encourage patient to cough after use and utilize splinting techniques if applicable  8.  Airway suctioning  9.  Monitor sputum production for changes in color, consistency and frequency  10. Perform frequent oral hygiene  11. Alternate physical activity with rest periods  Outcome: Progressing  Note: Titrating off oxygen       Problem: Pain - Standard  Goal: Alleviation of pain or a reduction in pain to the patient’s comfort goal  Description: Target End Date:  Prior to discharge or change in level of care    Document on Vitals flowsheet    1.  Document pain using the appropriate pain scale per order or unit policy  2.  Educate and implement non-pharmacologic comfort measures (i.e. relaxation, distraction, massage, cold/heat therapy, etc.)  3.  Pain management medications as ordered  4.  Reassess pain after pain med administration per policy  5.  If opiods administered assess patient's response to pain medication is appropriate per POSS sedation scale  6.  Follow pain management plan developed in collaboration with patient and interdisciplinary team (including palliative care or pain specialists if applicable)  Outcome: Progressing  Note: Pain assessed, interventions given as indicated and interventions reassessed.          Patient is not progressing towards the following goals:

## 2023-11-15 NOTE — DISCHARGE PLANNING
Case Management Discharge Planning    Admission Date: 11/11/2023  GMLOS: 4.8  ALOS: 4    6-Clicks ADL Score:    6-Clicks Mobility Score: 17  PT and/or OT Eval ordered: Yes  Post-acute Referrals Ordered: No  Post-acute Choice Obtained: No  Has referral(s) been sent to post-acute provider:  No      Anticipated Discharge Dispo: Discharge Disposition: D/T to SNF with Medicare cert in anticipation of skilled care (03)  Discharge Address: [address] [place] [address]    DME Needed: No    Action(s) Taken: DC Assessment Complete (See below)    LSW met with pt at bedside to complete DC assessment. LSW Introduced self and department roles. Pt A&Ox4 and able to verify the information on the face sheet. Pt lives with his wife in a single-story house. Pt verified address as [address] [place] [address]. Pt verified PCP as Dr. Fuentes Curran M.D. Prior to this hospitalization pt was independent at home with ADLs and most IADLs. Pt does not use any DME at baseline. Pt reported that his family is a good support. Pt denies any SA or MH concerns. Pt does have an advance directive with his wife Marilyn as his DPOA. Pt family will transport pt home.     Escalations Completed: None    Medically Clear: No    Next Steps:  f/u with pt and medical team to discuss dc needs and barriers.     Barriers to Discharge: Medical clearance and Pending PT Evaluation    Is the patient up for discharge tomorrow: No      Care Transition Team Assessment    Information Source  Orientation Level: Oriented X4  Information Given By: Patient  Who is responsible for making decisions for patient? : Patient    Readmission Evaluation  Is this a readmission?: No    Elopement Risk  Legal Hold: No  Ambulatory or Self Mobile in Wheelchair: No-Not an Elopement Risk  Elopement Risk: Not at Risk for Elopement    Interdisciplinary Discharge Planning  Lives with - Patient's Self Care Capacity: Spouse (Marilyn, works part time, can assist)  Housing /  Facility: [address] [facility]  Prior Services: None    Discharge Preparedness  What is your plan after discharge?: Uncertain - pending medical team collaboration  What are your discharge supports?: Spouse, Child  Prior Functional Level: Ambulatory, Independent with Activities of Daily Living, Independent with Medication Management  Difficulity with ADLs: None  Difficulity with IADLs: None    Functional Assesment  Prior Functional Level: Ambulatory, Independent with Activities of Daily Living, Independent with Medication Management    Finances  Financial Barriers to Discharge: No  Prescription Coverage: Yes    Advance Directive  Advance Directive?: DPOA for Health Care  Durable Power of  Name and Contact : Marilyn Yan [phone] (Wife)    Domestic Abuse  Have you ever been the victim of abuse or violence?: No  Physical Abuse or Sexual Abuse: No  Verbal Abuse or Emotional Abuse: No  Possible Abuse/Neglect Reported to:: Not Applicable    Psychological Assessment  History of Substance Abuse: None  History of Psychiatric Problems: No  Non-compliant with Treatment: No    Discharge Risks or Barriers  Patient risk factors: Complex medical needs    Anticipated Discharge Information  Discharge Disposition: D/T to SNF with Medicare cert in anticipation of skilled care (03)  Discharge Address: [address] [place] [id number]

## 2023-11-15 NOTE — PROGRESS NOTES
"Critical Care Progress Note    Date of admission  11/11/2023    Chief Complaint  68 y.o. male admitted 11/11/2023 with worsening weakness. Transferred to ICU on 11/11 for shock    Hospital Course  \"68 y.o. male with a pmhx of HTN, GERD, gout who presented 11/11/2023 with jaundice, poor oral intake and malaise.  The patient and his wife report approximately 1 month diagnosis of a pancreatic mass and lung metastasis.  He underwent a EGD on 10/31/2023 by Dr. Heredia at [facility] for biopsy of this mass and unfortunately the pathology has returned 3 days ago with adenocarcinoma.  Given the pulmonary metastasis this would stage him to stage IV pancreatic adenocarcinoma, they have a follow-up appointment with his oncologist Dr. Hatch at [facility] next week.  Over the past week the patient's wife states he has been progressively weaker mostly staying in bed with the exception of going to the bathroom.  He has had incredibly poor oral intake and they have been trying bone broths and boost to increase his caloric intake.  He presented to the ER where he was found to have a fairly unremarkable CBC, hyponatremia at 124, ADAM with a creatinine of 1.63, BUN 45, AST and ALT elevations at 110 and 150 respectively and an alkaline phosphatase of 788.  In addition to this his total bilirubin was noted at 12.2.  Other than his weakness, malaise and inability to eat he has noted some acid epigastric pain which does radiate to his back.  He denies any bowel or bladder changes, he has had some increasing dyspnea however no chest pain.  Given the abruptness of this symptoms onset and the acuity of his diagnosis he has not had the opportunity to discuss treatment plans or goals of care with his oncology team.     11/12 - Admit ICU shock (hypovelemic vs septic/distributive), hyperbili from mechanical obstruction d/t panc mass.  Titrating NE, Zosyn, GI to stent tomorrow AM, DNR/DNI\" - Per Dr. Green's note    11/13 - Requiring 0.06 levo, LR @ 100 " cc/hr. ECHO demonstrating normal EF of 70%, however with RV overload and reduced RV fxn, RVSP 55. U/S BLEs demonstrating bilateral DVT, CTA chest demonstrating extensive bilateral PE's w/ RV strain. IR consulted, initiated on Heparin bolus/drip. S/p EKOS catheter placement with catheter-directed thrombolysis.    11/14 - EKOS catheters removed 11/13 evening, on IV heparin. Significant subjective improvement, requiring 0.06 levo. On 3 L NC supplementation. Currently on therapeutic anticoagulation with IV heparin. Oncology consulted, appreciate support. IV Zosyn discontinued.    Interval Problem Update  Reviewed last 24 hour events:  No acute events overnight. Down-titrated to 0.01 Levo. On 1.5 L NC supplementation. Otherwise stable vitals. On IV heparin. Patient and family still deciding on whether to pursue treatment versus comfort measures/hospice.     Review of Systems  Review of Systems   Constitutional:  Negative for chills and fever.   Respiratory:  Negative for cough, shortness of breath and wheezing.    Cardiovascular:  Negative for chest pain and palpitations.   Gastrointestinal:  Negative for abdominal pain, blood in stool, melena, nausea and vomiting.   Genitourinary:  Negative for dysuria and hematuria.   Neurological:  Negative for speech change, focal weakness and headaches.   Psychiatric/Behavioral:  The patient is not nervous/anxious.         Vital Signs for last 24 hours   Temp:  [35.8 °C (96.5 °F)] 35.8 °C (96.5 °F)  Pulse:  [68-91] 74  Resp:  [12-49] 16  BP: ()/(49-72) 106/60  SpO2:  [90 %-98 %] 92 %    Hemodynamic parameters for last 24 hours       Respiratory Information for the last 24 hours       Physical Exam   Physical Exam  Constitutional:       Appearance: He is ill-appearing.   HENT:      Head: Normocephalic and atraumatic.      Mouth/Throat:      Mouth: Mucous membranes are moist.   Eyes:      General: Scleral icterus present.      Extraocular Movements: Extraocular movements intact.       Pupils: Pupils are equal, round, and reactive to light.   Cardiovascular:      Rate and Rhythm: Normal rate and regular rhythm.      Heart sounds: No murmur heard.     No friction rub. No gallop.   Pulmonary:      Effort: Pulmonary effort is normal.      Breath sounds: Normal breath sounds. No wheezing, rhonchi or rales.   Abdominal:      General: Abdomen is flat.      Tenderness: There is no abdominal tenderness. There is no guarding or rebound.   Musculoskeletal:      Right lower leg: No edema.      Left lower leg: No edema.      Comments: Right femoral dressing C/D/I   Skin:     Coloration: Skin is jaundiced.   Neurological:      General: No focal deficit present.      Mental Status: He is alert and oriented to person, place, and time.         Medications  Current Facility-Administered Medications   Medication Dose Route Frequency Provider Last Rate Last Admin    LORazepam (Ativan) injection 0.5 mg  0.5 mg Intravenous Q4HRS PRN Cosme Traylor M.D.   0.5 mg at 11/14/23 1752    heparin infusion 25,000 units in 500 mL 0.45% NACL  0-30 Units/kg/hr Intravenous Continuous Misha Kirby M.D. 24 mL/hr at 11/15/23 0654 12 Units/kg/hr at 11/15/23 0654    heparin injection 4,000 Units  40 Units/kg Intravenous PRN Misha Kirby M.D.   4,000 Units at 11/13/23 2108    oxyCODONE CR (OxyCONTIN) tablet 10 mg  10 mg Oral Q12HRS Real Green M.D.   10 mg at 11/15/23 0559    HYDROmorphone (Dilaudid) injection 0.25 mg  0.25 mg Intravenous Q HOUR PRN Real Green M.D.   0.25 mg at 11/15/23 0339    MD Alert...ICU Electrolyte Replacement per Pharmacy   Other PHARMACY TO DOSE Cosme Traylor M.D.        omeprazole (PriLOSEC) capsule 20 mg  20 mg Oral DAILY Vinnie Matias M.D.   20 mg at 11/15/23 0559    buPROPion SR (Wellbutrin-SR) tablet 150 mg  150 mg Oral QAM Vinnie Matias M.D.   150 mg at 11/15/23 0559    oxyCODONE immediate-release (Roxicodone) tablet 5 mg  5 mg Oral Q3HRS PRN Vinnie Matias M.D.    5 mg at 11/14/23 2224    Or    oxyCODONE immediate release (Roxicodone) tablet 10 mg  10 mg Oral Q3HRS PRN Vinnie Matias M.D.   10 mg at 11/15/23 0226    labetalol (Normodyne/Trandate) injection 10 mg  10 mg Intravenous Q4HRS PRN Vinnie Matias M.D.        ondansetron (Zofran) syringe/vial injection 4 mg  4 mg Intravenous Q4HRS PRN Vinnie Matias M.D.        ondansetron (Zofran ODT) dispertab 4 mg  4 mg Oral Q4HRS PRN Vinnie Matias M.D.        hydrOXYzine HCl (Atarax) tablet 25 mg  25 mg Oral TID PRN Vinnie Matias M.D.   25 mg at 11/11/23 2026    norepinephrine (Levophed) 8 mg in 250 mL NS infusion (premix)  0-1 mcg/kg/min (Ideal) Intravenous Continuous Cristino Sosa Jr., D.O. 6 mL/hr at 11/14/23 2257 0.04 mcg/kg/min at 11/14/23 2257       Fluids    Intake/Output Summary (Last 24 hours) at 11/15/2023 0828  Last data filed at 11/15/2023 0400  Gross per 24 hour   Intake 857.25 ml   Output 1000 ml   Net -142.75 ml       Laboratory          Recent Labs     11/13/23  0525 11/14/23  0610 11/15/23  0345   SODIUM 127* 130* 130*   POTASSIUM 4.8 4.7 4.3   CHLORIDE 96 100 101   CO2 16* 16* 19*   BUN 44* 43* 38*   CREATININE 1.35 1.30 1.16   MAGNESIUM 2.1 2.2 2.3   PHOSPHORUS 4.0 4.3 2.9   CALCIUM 8.1* 7.8* 7.8*     Recent Labs     11/13/23  0525 11/14/23  0610 11/15/23  0345   ALTSGPT 133* 131* 152*   ASTSGOT 101* 108* 148*   ALKPHOSPHAT 676* 668* 664*   TBILIRUBIN 9.8* 7.8* 7.6*   GLUCOSE 140* 135* 119*     Recent Labs     11/13/23  0525 11/13/23  1935 11/14/23  0330 11/14/23  0610 11/15/23  0345   WBC 10.8 10.8 11.9*  --  9.1   NEUTSPOLYS 80.30*  --  79.30*  --  79.80*   LYMPHOCYTES 8.40*  --  9.50*  --  9.90*   MONOCYTES 8.50  --  8.80  --  7.70   EOSINOPHILS 0.40  --  0.30  --  1.00   BASOPHILS 0.50  --  0.70  --  0.40   ASTSGOT 101*  --   --  108* 148*   ALTSGPT 133*  --   --  131* 152*   ALKPHOSPHAT 676*  --   --  668* 664*   TBILIRUBIN 9.8*  --   --  7.8* 7.6*     Recent Labs     11/13/23  0736 11/13/23  1030  11/13/23  1935 11/14/23  0330 11/15/23  0345   RBC  --   --  3.90* 3.90* 3.56*   HEMOGLOBIN  --   --  12.6* 12.7* 11.4*   HEMATOCRIT  --   --  37.0* 36.7* 33.2*   PLATELETCT  --   --  136* 74* 134*   PROTHROMBTM 20.7* 21.1* 22.7*  --   --    APTT 29.8 28.7 52.3*  --   --    INR 1.75* 1.79* 1.97*  --   --        Imaging  CT:    Reviewed  Echo:   Reviewed  Ultrasound:  Reviewed    Assessment/Plan  * Shock (HCC)- (present on admission)  Assessment & Plan  Initial concerns for hypovolemic vs. Septic/distributive shock. Found to have submassive bilateral PE on 11/13, therefore likely obstructive shock. S/p EKOS catheter directed thrombolysis (11/13)    -Continue pressor support with levophed to maintain MAP >65 mmHg, titrate as appropriate  -Will consider midodrine if persistent pressor requirements  -Continue to monitor hemodynamics    Acute pulmonary embolism with acute cor pulmonale (HCC)  Assessment & Plan  ECHO demonstrating normal EF of 70%, however with RV overload and reduced RV fxn, RVSP 55. U/S BLEs demonstrating bilateral DVTs, CTA chest demonstrating extensive bilateral PE's w/ RV strain. Like sequale of Stage IV pancreatic adenocarcinoma. SPESI score of 2, therefore high-risk for mortality.  S/p EKOS catheter directed thrombolysis (11/13), appreciate IR support.    -Telemetry monitoring with continuous pulse oximetry  -Continue therapeutic anticoagulation with IV heparin, transition to Lovenox vs DOAC when clinically appropriate    Biliary obstruction- (present on admission)  Assessment & Plan  Hx of Stage IV pancreatic adenocarcinoma. Noted to be encompassing portal, splenic and mesenteric veins, as well as causing CBD dilatation. GI consulted, appreciate support    -Tentative plan for ERCP w/ stent placement, appreciate GI support  -Continue to monitor/trend    DVT (deep venous thrombosis) (HCC)- (present on admission)  Assessment & Plan  Patient has hx of RLE DVT in 8/2015. U/S BLE demonstrating acute to  subacute occlusive RLE thrombus, and chronic partially occlusive LLE thrombus. Likely in setting of metastatic pancreatic adenocarcinoma.    -Continue therapeutic anticoagulation with IV heparin  -Transition to Lovenox vs DOAC following tentative ERCP with stent placement    Right ventricular failure (HCC)  Assessment & Plan  ECHO demonstrating RV volume overload and reduced RV functionm RVSP of 55. Likely in setting of Bilateral submassive PE, as seen on CTA Chest. S/p EKOS catheter directed thrombolysis (11/13)    -Continue therapeutic anticoagulation with IV heparin  -Continue to monitor    Acute kidney injury (HCC)- (present on admission)  Assessment & Plan  ADAM on presentation w/ BUN 45 and Cr of 1.63 (baseline ~1.1-1.3). Likely prerenal in setting of poor p.o. intake and subsequent shock.    -Now resolved  -Strict I/Os   -Avoid nephrotoxic agents  -Renally adjust medications    Pancreatic adenocarcinoma (HCC)- (present on admission)  Assessment & Plan  Patient has history of Stage IV pancreatic adenocarcinoma  with pulmonary and peritoneal metastases, leading to current biliary obstruction as well as bilateral DVTs/PES    -Continue to monitor clinical status  -Palliative care following  -Oncology consulted, with patient and family still deciding to pursue treatment vs comfort measures/hospice    Primary hypertension- (present on admission)  Assessment & Plan  History of HTN, on lisinopril 20 mg outpatient    -Hold antihypertensives in the setting of shock  -Reinitiate lisinopril when clinically appropriate.          VTE:  Heparin  Ulcer: PPI  Lines: None    I have performed a physical exam and reviewed and updated ROS and Plan today (11/15/2023). In review of yesterday's note (11/14/2023), there are no changes except as documented above.     Discussed patient condition and risk of morbidity and/or mortality with Family, RN, RT, Pharmacy, and Patient

## 2023-11-15 NOTE — PROGRESS NOTES
..Gastroenterology Progress Note               Author:  DOMINIQUE Corea   Date & Time Created: 11/15/2023 2:33 PM       Patient ID:  Name:             Adam Yan  YOB: 1955  Age:                 68 y.o.  male  MRN:               [id number]        Medical Decision Making, by Problem:  Active Hospital Problems    Diagnosis     Right ventricular failure (HCC) [I50.810]     Acute pulmonary embolism with acute cor pulmonale (HCC) [I26.09]     Biliary obstruction [K83.1]     Pancreatic adenocarcinoma (HCC) [C25.9]     Acute kidney injury (HCC) [N17.9]     Shock (HCC) [R57.9]     Goals of care, counseling/discussion [Z71.89]     DVT (deep venous thrombosis) (HCC) [I82.409]     Primary hypertension [I10]            Presenting Chief Complaint:  Obstructive jaundice from pancreatic cancer    HPI:  Adam Yan is a 68 y.o. male with a pmhx of HTN, GERD, gout who presented 11/11/2023 with jaundice, poor oral intake and malaise.  The patient and his wife report approximately 1 month diagnosis of a pancreatic mass and lung metastasis.  He underwent a EGD/EUS on 10/31/2023 by Dr. Heredia at [facility] for FNA of this mass and unfortunately the pathology has returned 3 days ago with adenocarcinoma.                                                                                Impression:            - Normal esophagus.                        - Normal stomach.                        - Erythematous duodenopathy.                        - There was diffuse abnormal echotexture in the visualized portion of the liver. This was characterized by a heterogenous appearance and a lobulated appearance.    Ascites was found on endosonographic examination of the peritoneal cavity.                        - A mass was identified in the uncinate process of the pancreas. However, the endosonographic appearance is suggestive of benign inflammatory changes. Fine needle biopsy performed.   There was dilation in the  common bile duct which measured up to 10 mm.       Given the pulmonary metastasis this would stage him to stage IV pancreatic adenocarcinoma, they have a follow-up [place] with [place] [facility] [place].  Over the past week the patient's wife states he has been progressively weaker mostly staying in bed with the exception of going to the bathroom.  He has had incredibly poor oral intake and they have been trying bone broths and boost to increase his caloric intake.  He presented to the ER where he was found to have a fairly unremarkable CBC, hyponatremia at 124, ADAM with a creatinine of 1.63, BUN 45, AST and ALT elevations at 110 and 150 respectively and an alkaline phosphatase of 788.  In addition to this his total bilirubin was noted at 12.2.  Other than his weakness, malaise and inability to eat he has noted some acid epigastric pain which does radiate to his back.  He denies any bowel or bladder changes, he has had some increasing dyspnea however no chest pain.  Given the abruptness of this symptoms onset and the acuity of his diagnosis he has not had the opportunity to discuss treatment plans or goals of care with his oncology team.          Interval History:  11/13/2023: Patient seen this morning.  Having increased tachycardia and shortness of breath.  Underwent CTA and found to have numerous pulmonary emboli.  Went to IR and underwent EKOS.    11/14/2023: Patient seen at bedside with son.  He reports some dyspnea but is significantly improved post EKOS.  He reports meeting with palliative care as well as oncology and that he is still thinking about how he would like to proceed with care.  Weaning vasopressors.    11/15/2023: Patient seen at bedside with wife.  Patient reports further improvement in dyspnea.  Weaned to 0.5 LPM NC.  He states that he is still thinking about how he would like to proceed with his care.  Vasopressors continue to wean.      Hospital Medications:  Current  Facility-Administered Medications   Medication Dose Frequency Provider Last Rate Last Admin    midodrine (Proamatine) tablet 5 mg  5 mg TID WITH MEALS Mark YONATHAN Saul D.O.   5 mg at 11/15/23 1156    LORazepam (Ativan) injection 0.5 mg  0.5 mg Q4HRS PRN Cosme Traylor M.D.   0.5 mg at 11/14/23 1752    heparin infusion 25,000 units in 500 mL 0.45% NACL  0-30 Units/kg/hr Continuous Misha Kirby M.D. 24 mL/hr at 11/15/23 1000 12 Units/kg/hr at 11/15/23 1000    heparin injection 4,000 Units  40 Units/kg PRN Misha Kirby M.D.   4,000 Units at 11/13/23 2108    oxyCODONE CR (OxyCONTIN) tablet 10 mg  10 mg Q12HRS Real Green M.D.   10 mg at 11/15/23 0559    HYDROmorphone (Dilaudid) injection 0.25 mg  0.25 mg Q HOUR PRN Real Green M.D.   0.25 mg at 11/15/23 0339    MD Alert...ICU Electrolyte Replacement per Pharmacy   PHARMACY TO DOSE Cosme Traylor M.D.        omeprazole (PriLOSEC) capsule 20 mg  20 mg DAILY Vinnie Matias M.D.   20 mg at 11/15/23 0559    buPROPion SR (Wellbutrin-SR) tablet 150 mg  150 mg QAM Vinnie Matias M.D.   150 mg at 11/15/23 0559    oxyCODONE immediate-release (Roxicodone) tablet 5 mg  5 mg Q3HRS PRN Vinnie Matias M.D.   5 mg at 11/14/23 2224    Or    oxyCODONE immediate release (Roxicodone) tablet 10 mg  10 mg Q3HRS PRN Vinnie Matias M.D.   10 mg at 11/15/23 0226    labetalol (Normodyne/Trandate) injection 10 mg  10 mg Q4HRS PRN Vinnie Matias M.D.        ondansetron (Zofran) syringe/vial injection 4 mg  4 mg Q4HRS PRN Vinnie Matias M.D.        ondansetron (Zofran ODT) dispertab 4 mg  4 mg Q4HRS PRN Vinnie Matias M.D.        hydrOXYzine HCl (Atarax) tablet 25 mg  25 mg TID PRN Vinnie Matias M.D.   25 mg at 11/11/23 2026    norepinephrine (Levophed) 8 mg in 250 mL NS infusion (premix)  0-1 mcg/kg/min (Ideal) Continuous Cristino Sosa Jr., D.O. 1.5 mL/hr at 11/15/23 0726 0.01 mcg/kg/min at 11/15/23 0726   Last reviewed on 11/11/2023  7:14 PM by Alayna Hernandez PhT  "      Review of Systems:  Review of Systems   Constitutional:  Positive for malaise/fatigue. Negative for chills and fever.   HENT:  Negative for congestion, hearing loss and sinus pain.    Eyes:  Negative for blurred vision.   Respiratory:  Positive for shortness of breath (Improved). Negative for cough.    Cardiovascular:  Negative for chest pain and leg swelling.   Gastrointestinal:  Positive for constipation. Negative for abdominal pain, blood in stool, diarrhea, heartburn, melena, nausea and vomiting.   Genitourinary:  Negative for dysuria.   Musculoskeletal:  Positive for back pain. Negative for myalgias.   Skin:  Negative for rash.   Neurological:  Positive for weakness. Negative for dizziness.   Psychiatric/Behavioral:  Negative for depression. The patient is not nervous/anxious.    All other systems reviewed and are negative.        Vital signs:  Weight/BMI: Body mass index is 29.14 kg/m².  BP 93/57   Pulse 73   Temp 35.8 °C (96.5 °F) (Temporal)   Resp (!) 21   Ht 1.854 m (6' 1\")   Wt 100 kg (220 lb 14.4 oz)   SpO2 93%   Vitals:    11/15/23 0915 11/15/23 0930 11/15/23 0945 11/15/23 1000   BP: 95/54 91/57 93/59 93/57   Pulse: 69 79 76 73   Resp: (!) 21 (!) 25 (!) 21 (!) 21   Temp:       TempSrc:       SpO2: 92% 92% 93% 93%   Weight:       Height:         Oxygen Therapy:  Pulse Oximetry: 93 %, O2 (LPM): 0.5, O2 Delivery Device: Silicone Nasal Cannula    Intake/Output Summary (Last 24 hours) at 11/15/2023 1433  Last data filed at 11/15/2023 0929  Gross per 24 hour   Intake 1158.75 ml   Output 1000 ml   Net 158.75 ml           Physical Exam:  Physical Exam  Vitals and nursing note reviewed.   Constitutional:       General: He is awake. He is not in acute distress.     Appearance: He is ill-appearing.   HENT:      Head: Normocephalic and atraumatic.      Nose: Nose normal. No congestion.      Mouth/Throat:      Mouth: Mucous membranes are moist.      Pharynx: Oropharynx is clear. No oropharyngeal exudate. "   Eyes:      General: Scleral icterus present.      Extraocular Movements: Extraocular movements intact.      Conjunctiva/sclera: Conjunctivae normal.   Cardiovascular:      Rate and Rhythm: Normal rate and regular rhythm.      Pulses: Normal pulses.      Heart sounds: Normal heart sounds. No murmur heard.  Pulmonary:      Effort: Pulmonary effort is normal. No respiratory distress.      Breath sounds: Normal breath sounds.   Abdominal:      General: Abdomen is flat. Bowel sounds are normal. There is distension.      Palpations: Abdomen is soft.      Tenderness: There is no abdominal tenderness. There is no guarding.   Musculoskeletal:         General: Normal range of motion.      Cervical back: Normal range of motion and neck supple.      Right lower leg: No edema.      Left lower leg: No edema.   Skin:     General: Skin is warm and dry.      Capillary Refill: Capillary refill takes less than 2 seconds.      Coloration: Skin is jaundiced.   Neurological:      Mental Status: He is alert and oriented to person, place, and time.      Motor: Weakness present.   Psychiatric:         Mood and Affect: Mood normal.         Behavior: Behavior normal. Behavior is cooperative.             Labs:  Recent Labs     11/13/23 0525 11/14/23 0610 11/15/23  0345   SODIUM 127* 130* 130*   POTASSIUM 4.8 4.7 4.3   CHLORIDE 96 100 101   CO2 16* 16* 19*   BUN 44* 43* 38*   CREATININE 1.35 1.30 1.16   MAGNESIUM 2.1 2.2 2.3   PHOSPHORUS 4.0 4.3 2.9   CALCIUM 8.1* 7.8* 7.8*       Recent Labs     11/13/23 0525 11/14/23  0610 11/15/23  0345   ALTSGPT 133* 131* 152*   ASTSGOT 101* 108* 148*   ALKPHOSPHAT 676* 668* 664*   TBILIRUBIN 9.8* 7.8* 7.6*   GLUCOSE 140* 135* 119*       Recent Labs     11/13/23  0525 11/13/23  1935 11/14/23  0330 11/14/23  0610 11/15/23  0345   WBC 10.8 10.8 11.9*  --  9.1   NEUTSPOLYS 80.30*  --  79.30*  --  79.80*   LYMPHOCYTES 8.40*  --  9.50*  --  9.90*   MONOCYTES 8.50  --  8.80  --  7.70   EOSINOPHILS 0.40  --   0.30  --  1.00   BASOPHILS 0.50  --  0.70  --  0.40   ASTSGOT 101*  --   --  108* 148*   ALTSGPT 133*  --   --  131* 152*   ALKPHOSPHAT 676*  --   --  668* 664*   TBILIRUBIN 9.8*  --   --  7.8* 7.6*       Recent Labs     11/13/23  0736 11/13/23  1030 11/13/23  1935 11/14/23  0330 11/15/23  0345   RBC  --   --  3.90* 3.90* 3.56*   HEMOGLOBIN  --   --  12.6* 12.7* 11.4*   HEMATOCRIT  --   --  37.0* 36.7* 33.2*   PLATELETCT  --   --  136* 74* 134*   PROTHROMBTM 20.7* 21.1* 22.7*  --   --    APTT 29.8 28.7 52.3*  --   --    INR 1.75* 1.79* 1.97*  --   --        Recent Results (from the past 24 hour(s))   Heparin Xa (Unfractionated)    Collection Time: 11/14/23  9:50 PM   Result Value Ref Range    Heparin Xa (UFH) 0.73 IU/mL   CBC WITH DIFFERENTIAL    Collection Time: 11/15/23  3:45 AM   Result Value Ref Range    WBC 9.1 4.8 - 10.8 K/uL    RBC 3.56 (L) 4.70 - 6.10 M/uL    Hemoglobin 11.4 (L) 14.0 - 18.0 g/dL    Hematocrit 33.2 (L) 42.0 - 52.0 %    MCV 93.3 81.4 - 97.8 fL    MCH 32.0 27.0 - 33.0 pg    MCHC 34.3 32.3 - 36.5 g/dL    RDW 53.0 (H) 35.9 - 50.0 fL    Platelet Count 134 (L) 164 - 446 K/uL    MPV 9.8 9.0 - 12.9 fL    Neutrophils-Polys 79.80 (H) 44.00 - 72.00 %    Lymphocytes 9.90 (L) 22.00 - 41.00 %    Monocytes 7.70 0.00 - 13.40 %    Eosinophils 1.00 0.00 - 6.90 %    Basophils 0.40 0.00 - 1.80 %    Immature Granulocytes 1.20 (H) 0.00 - 0.90 %    Nucleated RBC 0.00 0.00 - 0.20 /100 WBC    Neutrophils (Absolute) 7.21 1.82 - 7.42 K/uL    Lymphs (Absolute) 0.90 (L) 1.00 - 4.80 K/uL    Monos (Absolute) 0.70 0.00 - 0.85 K/uL    Eos (Absolute) 0.09 0.00 - 0.51 K/uL    Baso (Absolute) 0.04 0.00 - 0.12 K/uL    Immature Granulocytes (abs) 0.11 0.00 - 0.11 K/uL    NRBC (Absolute) 0.00 K/uL   Comp Metabolic Panel    Collection Time: 11/15/23  3:45 AM   Result Value Ref Range    Sodium 130 (L) 135 - 145 mmol/L    Potassium 4.3 3.6 - 5.5 mmol/L    Chloride 101 96 - 112 mmol/L    Co2 19 (L) 20 - 33 mmol/L    Anion Gap 10.0 7.0  - 16.0    Glucose 119 (H) 65 - 99 mg/dL    Bun 38 (H) 8 - 22 mg/dL    Creatinine 1.16 0.50 - 1.40 mg/dL    Calcium 7.8 (L) 8.5 - 10.5 mg/dL    Correct Calcium 9.2 8.5 - 10.5 mg/dL    AST(SGOT) 148 (H) 12 - 45 U/L    ALT(SGPT) 152 (H) 2 - 50 U/L    Alkaline Phosphatase 664 (H) 30 - 99 U/L    Total Bilirubin 7.6 (H) 0.1 - 1.5 mg/dL    Albumin 2.2 (L) 3.2 - 4.9 g/dL    Total Protein 5.1 (L) 6.0 - 8.2 g/dL    Globulin 2.9 1.9 - 3.5 g/dL    A-G Ratio 0.8 g/dL   MAGNESIUM    Collection Time: 11/15/23  3:45 AM   Result Value Ref Range    Magnesium 2.3 1.5 - 2.5 mg/dL   PHOSPHORUS    Collection Time: 11/15/23  3:45 AM   Result Value Ref Range    Phosphorus 2.9 2.5 - 4.5 mg/dL   Heparin Xa (Unfractionated)    Collection Time: 11/15/23  3:45 AM   Result Value Ref Range    Heparin Xa (UFH) 0.53 IU/mL   ESTIMATED GFR    Collection Time: 11/15/23  3:45 AM   Result Value Ref Range    GFR (CKD-EPI) 69 >60 mL/min/1.73 m 2   Heparin Xa (Unfractionated)    Collection Time: 11/15/23  9:56 AM   Result Value Ref Range    Heparin Xa (UFH) 0.45 IU/mL       Radiology Review:  IR-PULMONARY ARTERY-R-L SELECTIVE RIGHT   Final Result      1.  Pulmonary hypertension as recorded above.   2.  BILATERAL selective pulmonary arteriograms most notable for large distal right main pulmonary artery clot filling defect concordant with CTA findings.   3.  Placement of BILATERAL EKOS catheters   4.  Initiation of bilateral thrombolytic infusion with EKOS acoustic pulse thrombolysis therapy (US-assisted thrombolysis) for duration 4 hours      US-EXTREMITY VENOUS LOWER BILAT   Final Result      CT-CTA CHEST PULMONARY ARTERY W/ RECONS   Final Result      1.  Extensive bilateral pulmonary emboli involving the main pulmonary arteries, segmental, and subsegmental branches. RV strain noted.      2.  Diffuse metastatic disease throughout the lung fields.      3.  Confluent masslike infiltrates in the periphery of the lung fields bilaterally consistent with  metastatic disease and/or pneumonitis.      4.  Mild to moderate bilateral pleural effusions.            EC-ECHOCARDIOGRAM COMPLETE W/ CONT   Final Result      CT-ABDOMEN-PELVIS WITH   Final Result         1. 3 cm ill-defined pancreatic mass, which could represent pancreatic adenocarcinoma or cholangiocarcinoma. There is encasement of the adjacent portal, splenic, and mesenteric veins. There is common bile duct dilation above the level of the lesion.   2. There are several peritoneal nodules, largest measuring 4.1 cm. There is also ascites. Therefore, consistent with peritoneal carcinomatosis.   3. Innumerable lower lung zone nodules, which could represent metastatic disease or a benign process.   4. Liver appears cirrhotic.   5. Density within the gallbladder, as above.   6. Simple small right renal cyst, which does not require follow-up.      DX-CHEST-PORTABLE (1 VIEW)   Final Result      Hypoinflation with prominent interstitial and numerous small ill-defined pulmonary nodules bilaterally may indicate granulomatous disease, pneumonitis or neoplastic process.            MDM (Data Review):   -Records reviewed and summarized in current documentation  -I personally reviewed and interpreted the laboratory results  -I personally reviewed the radiology images    Assessment/Recommendations:  IMPRESSION/PLAN:  Pancreatic adenocarcinoma - metastatic to lung, ascites present-palliative care involved  Obstructive jaundice due to pancreatitic mass  Cholangitis - on antibiotics   Pulmonary emboli - status post EKOS     MDM:  This is a 60-year-old male with a past medical history as listed above who presented to [facility] on [facility]/13/2023 with worsening weakness and shortness of breath.  Initial plans for biliary stent placement were canceled due to pulmonary embolisms.  Patient underwent EKOS on 11/13/2023.  Post procedurally, patient reports dyspnea has improved.  Palliative care and oncology also evaluated  patient.  Oncology signed off.  Patient thinking about if he would like to undergo stent placement.  Discussed with his improvement in respiratory status and continuing to wean from pressors, may consider placement in the next couple days should he decide to pursue this route.      Plan:  Needs ERCP with biliary stent placement-decision/timing to be determined  Diet per primary team  Will reassess daily to determine timing of endoscopic intervention    Discussed with patient, wife at bedside, Dr. Zimmer.    Core Quality Measures   Reviewed items::  Labs, Medications and Radiology reports reviewed

## 2023-11-15 NOTE — CARE PLAN
The patient is Watcher - Medium risk of patient condition declining or worsening    Shift Goals  Clinical Goals: Wean off Levo  Patient Goals: Sleep  Family Goals: Sleep    Progress made toward(s) clinical / shift goals:      Problem: Knowledge Deficit - Standard  Goal: Patient and family/care givers will demonstrate understanding of plan of care, disease process/condition, diagnostic tests and medications  Outcome: Progressing     Problem: Hemodynamics  Goal: Patient's hemodynamics, fluid balance and neurologic status will be stable or improve  Outcome: Progressing     Problem: Pain - Standard  Goal: Alleviation of pain or a reduction in pain to the patient’s comfort goal  Outcome: Progressing     Problem: Fall Risk  Goal: Patient will remain free from falls  Outcome: Progressing       Patient is not progressing towards the following goals:

## 2023-11-16 PROBLEM — E87.1 HYPONATREMIA: Status: ACTIVE | Noted: 2023-11-16

## 2023-11-16 PROBLEM — D69.6 THROMBOCYTOPENIA (HCC): Status: ACTIVE | Noted: 2023-11-16

## 2023-11-16 LAB
ALBUMIN SERPL BCP-MCNC: 2.1 G/DL (ref 3.2–4.9)
ALBUMIN/GLOB SERPL: 0.8 G/DL
ALP SERPL-CCNC: 710 U/L (ref 30–99)
ALT SERPL-CCNC: 183 U/L (ref 2–50)
ANION GAP SERPL CALC-SCNC: 10 MMOL/L (ref 7–16)
AST SERPL-CCNC: 183 U/L (ref 12–45)
BASOPHILS # BLD AUTO: 0.4 % (ref 0–1.8)
BASOPHILS # BLD: 0.03 K/UL (ref 0–0.12)
BILIRUB SERPL-MCNC: 7.7 MG/DL (ref 0.1–1.5)
BUN SERPL-MCNC: 35 MG/DL (ref 8–22)
CALCIUM ALBUM COR SERPL-MCNC: 9.2 MG/DL (ref 8.5–10.5)
CALCIUM SERPL-MCNC: 7.7 MG/DL (ref 8.5–10.5)
CHLORIDE SERPL-SCNC: 102 MMOL/L (ref 96–112)
CO2 SERPL-SCNC: 19 MMOL/L (ref 20–33)
CREAT SERPL-MCNC: 1.01 MG/DL (ref 0.5–1.4)
EOSINOPHIL # BLD AUTO: 0.11 K/UL (ref 0–0.51)
EOSINOPHIL NFR BLD: 1.4 % (ref 0–6.9)
ERYTHROCYTE [DISTWIDTH] IN BLOOD BY AUTOMATED COUNT: 56.7 FL (ref 35.9–50)
GFR SERPLBLD CREATININE-BSD FMLA CKD-EPI: 81 ML/MIN/1.73 M 2
GLOBULIN SER CALC-MCNC: 2.8 G/DL (ref 1.9–3.5)
GLUCOSE SERPL-MCNC: 102 MG/DL (ref 65–99)
HCT VFR BLD AUTO: 34 % (ref 42–52)
HGB BLD-MCNC: 11.3 G/DL (ref 14–18)
IMM GRANULOCYTES # BLD AUTO: 0.12 K/UL (ref 0–0.11)
IMM GRANULOCYTES NFR BLD AUTO: 1.5 % (ref 0–0.9)
LYMPHOCYTES # BLD AUTO: 0.79 K/UL (ref 1–4.8)
LYMPHOCYTES NFR BLD: 9.9 % (ref 22–41)
MAGNESIUM SERPL-MCNC: 2.1 MG/DL (ref 1.5–2.5)
MCH RBC QN AUTO: 31.8 PG (ref 27–33)
MCHC RBC AUTO-ENTMCNC: 33.2 G/DL (ref 32.3–36.5)
MCV RBC AUTO: 95.8 FL (ref 81.4–97.8)
MONOCYTES # BLD AUTO: 0.6 K/UL (ref 0–0.85)
MONOCYTES NFR BLD AUTO: 7.5 % (ref 0–13.4)
NEUTROPHILS # BLD AUTO: 6.3 K/UL (ref 1.82–7.42)
NEUTROPHILS NFR BLD: 79.3 % (ref 44–72)
NRBC # BLD AUTO: 0 K/UL
NRBC BLD-RTO: 0 /100 WBC (ref 0–0.2)
PHOSPHATE SERPL-MCNC: 2.7 MG/DL (ref 2.5–4.5)
PLATELET # BLD AUTO: 130 K/UL (ref 164–446)
PMV BLD AUTO: 10.1 FL (ref 9–12.9)
POTASSIUM SERPL-SCNC: 4.1 MMOL/L (ref 3.6–5.5)
PROT SERPL-MCNC: 4.9 G/DL (ref 6–8.2)
RBC # BLD AUTO: 3.55 M/UL (ref 4.7–6.1)
SODIUM SERPL-SCNC: 131 MMOL/L (ref 135–145)
UFH PPP CHRO-ACNC: 0.32 IU/ML
WBC # BLD AUTO: 8 K/UL (ref 4.8–10.8)

## 2023-11-16 PROCEDURE — 700111 HCHG RX REV CODE 636 W/ 250 OVERRIDE (IP): Performed by: INTERNAL MEDICINE

## 2023-11-16 PROCEDURE — 85520 HEPARIN ASSAY: CPT

## 2023-11-16 PROCEDURE — A9270 NON-COVERED ITEM OR SERVICE: HCPCS

## 2023-11-16 PROCEDURE — 770004 HCHG ROOM/CARE - ONCOLOGY PRIVATE *

## 2023-11-16 PROCEDURE — A9270 NON-COVERED ITEM OR SERVICE: HCPCS | Performed by: HOSPITALIST

## 2023-11-16 PROCEDURE — A9270 NON-COVERED ITEM OR SERVICE: HCPCS | Performed by: EMERGENCY MEDICINE

## 2023-11-16 PROCEDURE — 99291 CRITICAL CARE FIRST HOUR: CPT | Performed by: INTERNAL MEDICINE

## 2023-11-16 PROCEDURE — 80053 COMPREHEN METABOLIC PANEL: CPT

## 2023-11-16 PROCEDURE — 84100 ASSAY OF PHOSPHORUS: CPT

## 2023-11-16 PROCEDURE — 700102 HCHG RX REV CODE 250 W/ 637 OVERRIDE(OP)

## 2023-11-16 PROCEDURE — 85025 COMPLETE CBC W/AUTO DIFF WBC: CPT

## 2023-11-16 PROCEDURE — 700111 HCHG RX REV CODE 636 W/ 250 OVERRIDE (IP): Performed by: RADIOLOGY

## 2023-11-16 PROCEDURE — 99232 SBSQ HOSP IP/OBS MODERATE 35: CPT | Performed by: NURSE PRACTITIONER

## 2023-11-16 PROCEDURE — 99233 SBSQ HOSP IP/OBS HIGH 50: CPT | Performed by: HOSPITALIST

## 2023-11-16 PROCEDURE — 700102 HCHG RX REV CODE 250 W/ 637 OVERRIDE(OP): Performed by: EMERGENCY MEDICINE

## 2023-11-16 PROCEDURE — 700102 HCHG RX REV CODE 250 W/ 637 OVERRIDE(OP): Performed by: HOSPITALIST

## 2023-11-16 PROCEDURE — 83735 ASSAY OF MAGNESIUM: CPT

## 2023-11-16 RX ADMIN — HEPARIN SODIUM 12 UNITS/KG/HR: 5000 INJECTION, SOLUTION INTRAVENOUS at 01:10

## 2023-11-16 RX ADMIN — OXYCODONE 5 MG: 5 TABLET ORAL at 16:52

## 2023-11-16 RX ADMIN — HEPARIN SODIUM 12 UNITS/KG/HR: 5000 INJECTION, SOLUTION INTRAVENOUS at 21:57

## 2023-11-16 RX ADMIN — MIDODRINE HYDROCHLORIDE 5 MG: 5 TABLET ORAL at 14:44

## 2023-11-16 RX ADMIN — LORAZEPAM 0.5 MG: 2 INJECTION INTRAMUSCULAR; INTRAVENOUS at 21:44

## 2023-11-16 RX ADMIN — OMEPRAZOLE 20 MG: 20 CAPSULE, DELAYED RELEASE ORAL at 05:11

## 2023-11-16 RX ADMIN — BUPROPION HYDROCHLORIDE 150 MG: 150 TABLET, EXTENDED RELEASE ORAL at 05:11

## 2023-11-16 RX ADMIN — DOCUSATE SODIUM 50 MG AND SENNOSIDES 8.6 MG 2 TABLET: 8.6; 5 TABLET, FILM COATED ORAL at 16:52

## 2023-11-16 RX ADMIN — MIDODRINE HYDROCHLORIDE 5 MG: 5 TABLET ORAL at 20:53

## 2023-11-16 RX ADMIN — DOCUSATE SODIUM 50 MG AND SENNOSIDES 8.6 MG 2 TABLET: 8.6; 5 TABLET, FILM COATED ORAL at 05:11

## 2023-11-16 RX ADMIN — MIDODRINE HYDROCHLORIDE 5 MG: 5 TABLET ORAL at 08:32

## 2023-11-16 RX ADMIN — OXYCODONE HYDROCHLORIDE 10 MG: 10 TABLET, FILM COATED, EXTENDED RELEASE ORAL at 05:11

## 2023-11-16 RX ADMIN — OXYCODONE HYDROCHLORIDE 10 MG: 10 TABLET, FILM COATED, EXTENDED RELEASE ORAL at 16:52

## 2023-11-16 ASSESSMENT — ENCOUNTER SYMPTOMS
NERVOUS/ANXIOUS: 0
MYALGIAS: 0
STRIDOR: 0
SPEECH CHANGE: 0
PALPITATIONS: 0
SINUS PAIN: 0
ABDOMINAL PAIN: 0
BACK PAIN: 0
DIZZINESS: 0
DIARRHEA: 0
WEAKNESS: 1
CONSTIPATION: 1
BLURRED VISION: 0
SHORTNESS OF BREATH: 0
HEADACHES: 0
COUGH: 0
ABDOMINAL PAIN: 1
CHILLS: 0
NAUSEA: 0
FEVER: 0
HEARTBURN: 0
BACK PAIN: 1
VOMITING: 0
BLOOD IN STOOL: 0
DEPRESSION: 0

## 2023-11-16 ASSESSMENT — COGNITIVE AND FUNCTIONAL STATUS - GENERAL
DAILY ACTIVITIY SCORE: 18
TURNING FROM BACK TO SIDE WHILE IN FLAT BAD: A LITTLE
TOILETING: A LITTLE
WALKING IN HOSPITAL ROOM: A LITTLE
DRESSING REGULAR LOWER BODY CLOTHING: A LOT
CLIMB 3 TO 5 STEPS WITH RAILING: A LOT
STANDING UP FROM CHAIR USING ARMS: A LITTLE
HELP NEEDED FOR BATHING: A LOT
MOVING FROM LYING ON BACK TO SITTING ON SIDE OF FLAT BED: A LITTLE
SUGGESTED CMS G CODE MODIFIER DAILY ACTIVITY: CK
DRESSING REGULAR UPPER BODY CLOTHING: A LITTLE
MOBILITY SCORE: 17
MOVING TO AND FROM BED TO CHAIR: A LITTLE
SUGGESTED CMS G CODE MODIFIER MOBILITY: CK

## 2023-11-16 ASSESSMENT — PAIN DESCRIPTION - PAIN TYPE
TYPE: ACUTE PAIN

## 2023-11-16 NOTE — PROGRESS NOTES
..Gastroenterology Progress Note               Author:  DOMINIQUE Corea   Date & Time Created: 11/16/2023 3:21 PM       Patient ID:  Name:             Adam Yan  YOB: 1955  Age:                 68 y.o.  male  MRN:               [id number]        Medical Decision Making, by Problem:  Active Hospital Problems    Diagnosis     Right ventricular failure (HCC) [I50.810]     Acute pulmonary embolism with acute cor pulmonale (HCC) [I26.09]     Biliary obstruction [K83.1]     Pancreatic adenocarcinoma (HCC) [C25.9]     Acute kidney injury (HCC) [N17.9]     Shock (HCC) [R57.9]     Goals of care, counseling/discussion [Z71.89]     DVT (deep venous thrombosis) (HCC) [I82.409]     Primary hypertension [I10]            Presenting Chief Complaint:  Obstructive jaundice from pancreatic cancer    HPI:  Adam Yan is a 68 y.o. male with a pmhx of HTN, GERD, gout who presented 11/11/2023 with jaundice, poor oral intake and malaise.  The patient and his wife report approximately 1 month diagnosis of a pancreatic mass and lung metastasis.  He underwent a EGD/EUS on 10/31/2023 by Dr. Heredia at [facility] for FNA of this mass and unfortunately the pathology has returned 3 days ago with adenocarcinoma.                                                                                Impression:            - Normal esophagus.                        - Normal stomach.                        - Erythematous duodenopathy.                        - There was diffuse abnormal echotexture in the visualized portion of the liver. This was characterized by a heterogenous appearance and a lobulated appearance.    Ascites was found on endosonographic examination of the peritoneal cavity.                        - A mass was identified in the uncinate process of the pancreas. However, the endosonographic appearance is suggestive of benign inflammatory changes. Fine needle biopsy performed.   There was dilation in the  common bile duct which measured up to 10 mm.       Given the pulmonary metastasis this would stage him to stage IV pancreatic adenocarcinoma, they have a follow-up [place] [facility] [place]  Over the past week the patient's wife states he has been progressively weaker mostly staying in bed with the exception of going to the bathroom.  He has had incredibly poor oral intake and they have been trying bone broths and boost to increase his caloric intake.  He presented to the ER where he was found to have a fairly unremarkable CBC, hyponatremia at 124, ADAM with a creatinine of 1.63, BUN 45, AST and ALT elevations at 110 and 150 respectively and an alkaline phosphatase of 788.  In addition to this his total bilirubin was noted at 12.2.  Other than his weakness, malaise and inability to eat he has noted some acid epigastric pain which does radiate to his back.  He denies any bowel or bladder changes, he has had some increasing dyspnea however no chest pain.  Given the abruptness of this symptoms onset and the acuity of his diagnosis he has not had the opportunity to discuss treatment plans or goals of care with his oncology team.          Interval History:  11/13/2023: Patient seen this morning.  Having increased tachycardia and shortness of breath.  Underwent CTA and found to have numerous pulmonary emboli.  Went to IR and underwent EKOS.    11/14/2023: Patient seen at bedside with son.  He reports some dyspnea but is significantly improved post EKOS.  He reports meeting with palliative care as well as oncology and that he is still thinking about how he would like to proceed with care.  Weaning vasopressors.    11/15/2023: Patient seen at bedside with wife.  Patient reports further improvement in dyspnea.  Weaned to 0.5 LPM NC.  He states that he is still thinking about how he would like to proceed with his care.  Vasopressors continue to wean.    11/16/2023: patient seen at bedside  with wife and daughter. Dyspnea improved. On 1LPM NC. Off vasopressors, moved out of ICU. Hgb stable. Tbili 7.7.       Hospital Medications:  Current Facility-Administered Medications   Medication Dose Frequency Provider Last Rate Last Admin    midodrine (Proamatine) tablet 5 mg  5 mg TID WITH MEALS Markmiladis Saul, D.O.   5 mg at 11/16/23 1444    senna-docusate (Pericolace Or Senokot S) 8.6-50 MG per tablet 2 Tablet  2 Tablet BID Markmiladis Saul, D.O.   2 Tablet at 11/16/23 0511    And    polyethylene glycol/lytes (Miralax) PACKET 1 Packet  1 Packet QDAY PRN Mark Saul, D.O.        And    magnesium hydroxide (Milk Of Magnesia) suspension 30 mL  30 mL QDAY PRN Mark Saul, D.O.        And    bisacodyl (Dulcolax) suppository 10 mg  10 mg QDAY PRN Mark Saul, D.O.        LORazepam (Ativan) injection 0.5 mg  0.5 mg Q4HRS PRN Cosme Traylor M.D.   0.5 mg at 11/15/23 1933    heparin infusion 25,000 units in 500 mL 0.45% NACL  0-30 Units/kg/hr Continuous Misha Kirby M.D. 24 mL/hr at 11/16/23 0722 12 Units/kg/hr at 11/16/23 0722    heparin injection 4,000 Units  40 Units/kg PRN Misha Kirby M.D.   4,000 Units at 11/13/23 2108    oxyCODONE CR (OxyCONTIN) tablet 10 mg  10 mg Q12HRS Real Green M.D.   10 mg at 11/16/23 0511    HYDROmorphone (Dilaudid) injection 0.25 mg  0.25 mg Q HOUR PRN Real Green M.D.   0.25 mg at 11/15/23 0339    MD Alert...ICU Electrolyte Replacement per Pharmacy   PHARMACY TO DOSE Cosme Traylor M.D.        omeprazole (PriLOSEC) capsule 20 mg  20 mg DAILY Vinnie Matias M.D.   20 mg at 11/16/23 0511    buPROPion SR (Wellbutrin-SR) tablet 150 mg  150 mg JENNIFER Matias M.D.   150 mg at 11/16/23 0511    oxyCODONE immediate-release (Roxicodone) tablet 5 mg  5 mg Q3HRS JANET Matias M.D.   5 mg at 11/15/23 1616    Or    oxyCODONE immediate release (Roxicodone) tablet 10 mg  10 mg Q3HRS PRMIRZA Matias M.D.   10 mg at 11/15/23 0226    labetalol  "(Normodyne/Trandate) injection 10 mg  10 mg Q4HRS PRN Vinnie Maitas M.D.        ondansetron (Zofran) syringe/vial injection 4 mg  4 mg Q4HRS PRN Vinnie Matias M.D.        ondansetron (Zofran ODT) dispertab 4 mg  4 mg Q4HRS PRN Vinnie Matias M.D.        hydrOXYzine HCl (Atarax) tablet 25 mg  25 mg TID PRN Vinnie Matias M.D.   25 mg at 11/11/23 2026   Last reviewed on 11/11/2023  7:14 PM by Alayna Hernandez LALO       Review of Systems:  Review of Systems   Constitutional:  Positive for malaise/fatigue. Negative for chills and fever.   HENT:  Negative for congestion, hearing loss and sinus pain.    Eyes:  Negative for blurred vision.   Respiratory:  Negative for cough and shortness of breath (Improved).    Cardiovascular:  Negative for chest pain and leg swelling.   Gastrointestinal:  Positive for constipation. Negative for abdominal pain, blood in stool, diarrhea, heartburn, melena, nausea and vomiting.   Genitourinary:  Negative for dysuria.   Musculoskeletal:  Positive for back pain. Negative for myalgias.   Skin:  Negative for itching and rash.   Neurological:  Positive for weakness. Negative for dizziness.   Psychiatric/Behavioral:  Negative for depression. The patient is not nervous/anxious.    All other systems reviewed and are negative.        Vital signs:  Weight/BMI: Body mass index is 29.14 kg/m².  /70   Pulse 75   Temp 36.1 °C (97 °F) (Temporal)   Resp 18   Ht 1.854 m (6' 1\")   Wt 100 kg (220 lb 14.4 oz)   SpO2 92%   Vitals:    11/16/23 0600 11/16/23 0700 11/16/23 0800 11/16/23 0905   BP: 103/58 112/63 100/58 108/70   Pulse: 73 78 73 75   Resp:   20 18   Temp:   36.1 °C (96.9 °F) 36.1 °C (97 °F)   TempSrc:   Temporal Temporal   SpO2: 92% 92% 92% 92%   Weight:       Height:         Oxygen Therapy:  Pulse Oximetry: 92 %, O2 (LPM): 1, O2 Delivery Device: Silicone Nasal Cannula    Intake/Output Summary (Last 24 hours) at 11/16/2023 1521  Last data filed at 11/16/2023 1443  Gross per 24 hour   Intake " 970.47 ml   Output 1000 ml   Net -29.53 ml           Physical Exam:  Physical Exam  Vitals and nursing note reviewed.   Constitutional:       General: He is awake. He is not in acute distress.     Appearance: He is ill-appearing.   HENT:      Head: Normocephalic and atraumatic.      Nose: Nose normal. No congestion.      Mouth/Throat:      Mouth: Mucous membranes are moist.      Pharynx: Oropharynx is clear. No oropharyngeal exudate.   Eyes:      General: Scleral icterus present.      Extraocular Movements: Extraocular movements intact.      Conjunctiva/sclera: Conjunctivae normal.   Cardiovascular:      Rate and Rhythm: Normal rate and regular rhythm.      Pulses: Normal pulses.      Heart sounds: Normal heart sounds. No murmur heard.  Pulmonary:      Effort: Pulmonary effort is normal. No respiratory distress.      Breath sounds: Normal breath sounds.   Abdominal:      General: Abdomen is flat. Bowel sounds are normal. There is no distension.      Palpations: Abdomen is soft.      Tenderness: There is no abdominal tenderness. There is no guarding.   Musculoskeletal:         General: Normal range of motion.      Cervical back: Normal range of motion and neck supple.      Right lower leg: No edema.      Left lower leg: No edema.   Skin:     General: Skin is warm and dry.      Capillary Refill: Capillary refill takes less than 2 seconds.      Coloration: Skin is jaundiced.   Neurological:      Mental Status: He is alert and oriented to person, place, and time.      Motor: Weakness present.   Psychiatric:         Mood and Affect: Mood normal.         Behavior: Behavior normal. Behavior is cooperative.         Thought Content: Thought content normal.         Judgment: Judgment normal.             Labs:  Recent Labs     11/14/23  0610 11/15/23  0345 11/16/23  0447   SODIUM 130* 130* 131*   POTASSIUM 4.7 4.3 4.1   CHLORIDE 100 101 102   CO2 16* 19* 19*   BUN 43* 38* 35*   CREATININE 1.30 1.16 1.01   MAGNESIUM 2.2 2.3 2.1    PHOSPHORUS 4.3 2.9 2.7   CALCIUM 7.8* 7.8* 7.7*       Recent Labs     11/14/23  0610 11/15/23  0345 11/16/23  0447   ALTSGPT 131* 152* 183*   ASTSGOT 108* 148* 183*   ALKPHOSPHAT 668* 664* 710*   TBILIRUBIN 7.8* 7.6* 7.7*   GLUCOSE 135* 119* 102*       Recent Labs     11/14/23  0330 11/14/23  0610 11/15/23  0345 11/16/23  0447   WBC 11.9*  --  9.1 8.0   NEUTSPOLYS 79.30*  --  79.80* 79.30*   LYMPHOCYTES 9.50*  --  9.90* 9.90*   MONOCYTES 8.80  --  7.70 7.50   EOSINOPHILS 0.30  --  1.00 1.40   BASOPHILS 0.70  --  0.40 0.40   ASTSGOT  --  108* 148* 183*   ALTSGPT  --  131* 152* 183*   ALKPHOSPHAT  --  668* 664* 710*   TBILIRUBIN  --  7.8* 7.6* 7.7*       Recent Labs     11/13/23  1935 11/14/23  0330 11/15/23  0345 11/16/23  0447   RBC 3.90* 3.90* 3.56* 3.55*   HEMOGLOBIN 12.6* 12.7* 11.4* 11.3*   HEMATOCRIT 37.0* 36.7* 33.2* 34.0*   PLATELETCT 136* 74* 134* 130*   PROTHROMBTM 22.7*  --   --   --    APTT 52.3*  --   --   --    INR 1.97*  --   --   --        Recent Results (from the past 24 hour(s))   CBC WITH DIFFERENTIAL    Collection Time: 11/16/23  4:47 AM   Result Value Ref Range    WBC 8.0 4.8 - 10.8 K/uL    RBC 3.55 (L) 4.70 - 6.10 M/uL    Hemoglobin 11.3 (L) 14.0 - 18.0 g/dL    Hematocrit 34.0 (L) 42.0 - 52.0 %    MCV 95.8 81.4 - 97.8 fL    MCH 31.8 27.0 - 33.0 pg    MCHC 33.2 32.3 - 36.5 g/dL    RDW 56.7 (H) 35.9 - 50.0 fL    Platelet Count 130 (L) 164 - 446 K/uL    MPV 10.1 9.0 - 12.9 fL    Neutrophils-Polys 79.30 (H) 44.00 - 72.00 %    Lymphocytes 9.90 (L) 22.00 - 41.00 %    Monocytes 7.50 0.00 - 13.40 %    Eosinophils 1.40 0.00 - 6.90 %    Basophils 0.40 0.00 - 1.80 %    Immature Granulocytes 1.50 (H) 0.00 - 0.90 %    Nucleated RBC 0.00 0.00 - 0.20 /100 WBC    Neutrophils (Absolute) 6.30 1.82 - 7.42 K/uL    Lymphs (Absolute) 0.79 (L) 1.00 - 4.80 K/uL    Monos (Absolute) 0.60 0.00 - 0.85 K/uL    Eos (Absolute) 0.11 0.00 - 0.51 K/uL    Baso (Absolute) 0.03 0.00 - 0.12 K/uL    Immature Granulocytes (abs) 0.12  (H) 0.00 - 0.11 K/uL    NRBC (Absolute) 0.00 K/uL   Comp Metabolic Panel    Collection Time: 11/16/23  4:47 AM   Result Value Ref Range    Sodium 131 (L) 135 - 145 mmol/L    Potassium 4.1 3.6 - 5.5 mmol/L    Chloride 102 96 - 112 mmol/L    Co2 19 (L) 20 - 33 mmol/L    Anion Gap 10.0 7.0 - 16.0    Glucose 102 (H) 65 - 99 mg/dL    Bun 35 (H) 8 - 22 mg/dL    Creatinine 1.01 0.50 - 1.40 mg/dL    Calcium 7.7 (L) 8.5 - 10.5 mg/dL    Correct Calcium 9.2 8.5 - 10.5 mg/dL    AST(SGOT) 183 (H) 12 - 45 U/L    ALT(SGPT) 183 (H) 2 - 50 U/L    Alkaline Phosphatase 710 (H) 30 - 99 U/L    Total Bilirubin 7.7 (H) 0.1 - 1.5 mg/dL    Albumin 2.1 (L) 3.2 - 4.9 g/dL    Total Protein 4.9 (L) 6.0 - 8.2 g/dL    Globulin 2.8 1.9 - 3.5 g/dL    A-G Ratio 0.8 g/dL   MAGNESIUM    Collection Time: 11/16/23  4:47 AM   Result Value Ref Range    Magnesium 2.1 1.5 - 2.5 mg/dL   PHOSPHORUS    Collection Time: 11/16/23  4:47 AM   Result Value Ref Range    Phosphorus 2.7 2.5 - 4.5 mg/dL   Heparin Xa (Unfractionated)    Collection Time: 11/16/23  4:47 AM   Result Value Ref Range    Heparin Xa (UFH) 0.32 IU/mL   ESTIMATED GFR    Collection Time: 11/16/23  4:47 AM   Result Value Ref Range    GFR (CKD-EPI) 81 >60 mL/min/1.73 m 2       Radiology Review:  IR-PULMONARY ARTERY-R-L SELECTIVE RIGHT   Final Result      1.  Pulmonary hypertension as recorded above.   2.  BILATERAL selective pulmonary arteriograms most notable for large distal right main pulmonary artery clot filling defect concordant with CTA findings.   3.  Placement of BILATERAL EKOS catheters   4.  Initiation of bilateral thrombolytic infusion with EKOS acoustic pulse thrombolysis therapy (US-assisted thrombolysis) for duration 4 hours      US-EXTREMITY VENOUS LOWER BILAT   Final Result      CT-CTA CHEST PULMONARY ARTERY W/ RECONS   Final Result      1.  Extensive bilateral pulmonary emboli involving the main pulmonary arteries, segmental, and subsegmental branches. RV strain noted.      2.   Diffuse metastatic disease throughout the lung fields.      3.  Confluent masslike infiltrates in the periphery of the lung fields bilaterally consistent with metastatic disease and/or pneumonitis.      4.  Mild to moderate bilateral pleural effusions.            EC-ECHOCARDIOGRAM COMPLETE W/ CONT   Final Result      CT-ABDOMEN-PELVIS WITH   Final Result         1. 3 cm ill-defined pancreatic mass, which could represent pancreatic adenocarcinoma or cholangiocarcinoma. There is encasement of the adjacent portal, splenic, and mesenteric veins. There is common bile duct dilation above the level of the lesion.   2. There are several peritoneal nodules, largest measuring 4.1 cm. There is also ascites. Therefore, consistent with peritoneal carcinomatosis.   3. Innumerable lower lung zone nodules, which could represent metastatic disease or a benign process.   4. Liver appears cirrhotic.   5. Density within the gallbladder, as above.   6. Simple small right renal cyst, which does not require follow-up.      DX-CHEST-PORTABLE (1 VIEW)   Final Result      Hypoinflation with prominent interstitial and numerous small ill-defined pulmonary nodules bilaterally may indicate granulomatous disease, pneumonitis or neoplastic process.            MDM (Data Review):   -Records reviewed and summarized in current documentation  -I personally reviewed and interpreted the laboratory results  -I personally reviewed the radiology images    Assessment/Recommendations:  IMPRESSION/PLAN:  Pancreatic adenocarcinoma - metastatic to lung, ascites present-palliative care involved  Obstructive jaundice due to pancreatitic mass  Cholangitis - on antibiotics   Pulmonary emboli - status post EKOS     MDM:  This is a 60-year-old male with a past medical history as listed above who presented to [facility] on [facility]23 with worsening weakness and shortness of breath.  Initial plans for biliary stent placement were canceled due to  pulmonary embolisms.  Patient underwent EKOS on 11/13/2023.  Post procedurally, patient reports dyspnea has improved.  Palliative care and oncology also evaluated patient.  Oncology signed off. Patient dyspnea resolved. Weaned to 1LPM NC. Weaned off vasopressors and moved out of ICU.  Patient thinking about if he would like to undergo stent placement. Questions/concerns about stent discussed with patient and family. Patient and family are still unsure if benefits outweigh risks at this time and how much patient would benefit from a quality of life standpoint. They state that they would like to continue to discuss together about whether or not to pursue stent.     Plan:  ?ERCP with biliary stent placement-decision/timing to be determined  Diet per primary team  Will reassess daily to determine timing of endoscopic intervention    Discussed with patient, wife and daughter at bedside, Dr. Comer, Dr. Flores    Core Quality Measures   Reviewed items::  Labs, Medications and Radiology reports reviewed

## 2023-11-16 NOTE — PROGRESS NOTES
Inpatient Palliative Care     Location: R310     HPI:   Patient has been transferred to the cancer nursing unit.  He is seen lying in bed.  He remains jaundiced.  He is still on oxygen but less than 1 L.  No dyspnea evident.  He is still struggling with decisions regarding comfort focused care versus disease modifying treatment.     Summary:  Patient's wife Marilyn relays previous terrible experience with ERCP and including pain after procedure and difficulty swallowing.  In addition patient and wife are initially told by gastroenterologist that preliminary path was benign path results will be back in 2 days.  Patient he did not have a stent placed but started having immediate symptoms thereafter.  Patient shares that he is fearful regarding additional ERCP with stent placement given this history.  As such we discussed options based upon comfort versus disease modifying treatment or quality over quantity.  Either way, the stent may provide him with increased comfort and possible return of appetite and energy.  Marilyn has inquired if it would be appropriate to have hospice agency come off for information, this would be a good idea.       Active listening, reflection, reminiscing, validation & normalization, and empathic support utilized throughout this encounter.  All questions answered and contact information provided, encouraged to call with any questions or concerns.      Plan:     1) palliative care to continue to follow  2) plan to switch OxyContin prior to discharge  3) we will complete POLST prior to discharge.     Thank you for allowing me the opportunity to participate in the care of  Adam.     I spent a total of 40 minutes reviewing medical records, direct face-to-face time with the patient and/or family, coordination of care, and documentation. This is separate from the time spent on advance care planning, which is documented above.     Elvia Schmitz, MSN, APRN, ACNPC-AG.  Palliative Care Nurse  [facility]  [phone]

## 2023-11-16 NOTE — CARE PLAN
The patient is Stable - Low risk of patient condition declining or worsening    Shift Goals  Clinical Goals: Wean off Levo  Patient Goals: Sleep  Family Goals: Sleep    Progress made toward(s) clinical / shift goals:    Problem: Hemodynamics  Goal: Patient's hemodynamics, fluid balance and neurologic status will be stable or improve  Description: Target End Date:  Prior to discharge or change in level of care    Document on Assessment and I/O flowsheet templates    1.  Monitor vital signs, pulse oximetry and cardiac monitor per provider order and/or policy  2.  Maintain blood pressure per provider order  3.  Hemodynamic monitoring per provider order  4.  Manage IV fluids and IV infusions  5.  Monitor intake and output  6.  Daily weights per unit policy or provider order  7.  Assess peripheral pulses and capillary refill  8.  Assess color and body temperature  9.  Position patient for maximum circulation/cardiac output  10. Monitor for signs/symptoms of excessive bleeding  11. Assess mental status, restlessness and changes in level of consciousness  12. Monitor temperature and report fever or hypothermia to provider immediately. Consideration of targeted temperature management.  Outcome: Progressing  Note: Weaned off of levophed       Problem: Respiratory  Goal: Patient will achieve/maintain optimum respiratory ventilation and gas exchange  Description: Target End Date:  Prior to discharge or change in level of care    Document on Assessment flowsheet    1.  Assess and monitor rate, rhythm, depth and effort of respiration  2.  Breath sounds assessed qshift and/or as needed  3.  Assess O2 saturation, administer/titrate oxygen as ordered  4.  Position patient for maximum ventilatory efficiency  5.  Turn, cough, and deep breath with splinting to improve effectiveness  6.  Collaborate with RT to administer medication/treatments per order  7.  Encourage use of incentive spirometer and encourage patient to cough after use  and utilize splinting techniques if applicable  8.  Airway suctioning  9.  Monitor sputum production for changes in color, consistency and frequency  10. Perform frequent oral hygiene  11. Alternate physical activity with rest periods  Outcome: Progressing     Problem: Pain - Standard  Goal: Alleviation of pain or a reduction in pain to the patient’s comfort goal  Description: Target End Date:  Prior to discharge or change in level of care    Document on Vitals flowsheet    1.  Document pain using the appropriate pain scale per order or unit policy  2.  Educate and implement non-pharmacologic comfort measures (i.e. relaxation, distraction, massage, cold/heat therapy, etc.)  3.  Pain management medications as ordered  4.  Reassess pain after pain med administration per policy  5.  If opiods administered assess patient's response to pain medication is appropriate per POSS sedation scale  6.  Follow pain management plan developed in collaboration with patient and interdisciplinary team (including palliative care or pain specialists if applicable)  Outcome: Progressing  Note: Pain assessed, interventions given as indicated and interventions reassessed.         Patient is not progressing towards the following goals:

## 2023-11-16 NOTE — CARE PLAN
The patient is Watcher - Medium risk of patient condition declining or worsening    Shift Goals  Clinical Goals: Wean off Levo  Patient Goals: Sleep  Family Goals: Sleep    Progress made toward(s) clinical / shift goals:        Problem: Knowledge Deficit - Standard  Goal: Patient and family/care givers will demonstrate understanding of plan of care, disease process/condition, diagnostic tests and medications  Outcome: Progressing     Problem: Hemodynamics  Goal: Patient's hemodynamics, fluid balance and neurologic status will be stable or improve  Outcome: Progressing     Problem: Urinary - Renal Perfusion  Goal: Ability to achieve and maintain adequate renal perfusion and functioning will improve  Outcome: Progressing     Problem: Respiratory  Goal: Patient will achieve/maintain optimum respiratory ventilation and gas exchange  Outcome: Progressing     Problem: Physical Regulation  Goal: Diagnostic test results will improve  Outcome: Progressing     Problem: Pain - Standard  Goal: Alleviation of pain or a reduction in pain to the patient’s comfort goal  Outcome: Progressing     Problem: Skin Integrity  Goal: Skin integrity is maintained or improved  Outcome: Progressing     Problem: Fall Risk  Goal: Patient will remain free from falls  Outcome: Progressing

## 2023-11-16 NOTE — PROGRESS NOTES
Critical Care Progress Note    Date of admission  11/11/2023    Chief Complaint  68 y.o. male admitted 11/11/2023 with worsening weakness.  He has a history of stage IV adenocarcinoma the pancreas, primary hypertension, GERD and gout.    Hospital Course      11/12-Admit ICU shock (hypovelemic vs septic/distributive), hyperbili from mechanical obstruction d/t panc mass.  Titrating NE, Zosyn, GI to stent tomorrow AM, DNR/DNI    11/13 -    CTA with bilateral pulmonary emboli and lower extremity venous Doppler with DVT.  Echocardiogram confirms acute right heart failure.  EKOS today.  Begin full anticoagulation with heparin.  Hold on biliary stent.  11/14 -    tolerated EKOS well yesterday.  Titrating heparin and norepinephrine.  11/15 -    titrating heparin and norepinephrine.  11/16 -    off norepinephrine.  Titrating heparin.      Interval Problem Update  Reviewed last 24 hour events:      SR  Heparin  NE off  96.8      Adam feels better with less shortness of breath.  He denies any angina, palpitations or syncope.  He is able to tolerate his diet.  He denies nausea or vomiting.  His pain is controlled.      Review of Systems  Review of Systems   Unable to perform ROS: Acuity of condition        Vital Signs for last 24 hours   Temp:  [35.7 °C (96.3 °F)-36 °C (96.8 °F)] 35.9 °C (96.6 °F)  Pulse:  [60-81] 78  Resp:  [18-32] 22  BP: ()/(46-69) 112/63  SpO2:  [89 %-95 %] 92 %    Hemodynamic parameters for last 24 hours       Respiratory Information for the last 24 hours       Physical Exam   Physical Exam  Constitutional:       Appearance: He is not diaphoretic.   HENT:      Head: Normocephalic.      Mouth/Throat:      Pharynx: Oropharynx is clear.   Eyes:      General: Scleral icterus present.      Pupils: Pupils are equal, round, and reactive to light.   Cardiovascular:      Comments: Sinus rhythm  Pulmonary:      Breath sounds: Rales (Unchanged crackles at the bases) present. No wheezing.   Abdominal:       General: There is no distension.      Tenderness: There is no abdominal tenderness.   Musculoskeletal:         General: Normal range of motion.      Right lower leg: No edema.      Left lower leg: No edema.   Skin:     General: Skin is warm.   Neurological:      General: No focal deficit present.      Mental Status: He is oriented to person, place, and time.      Cranial Nerves: No cranial nerve deficit.   Psychiatric:         Mood and Affect: Mood normal.         Behavior: Behavior normal.         Thought Content: Thought content normal.         Judgment: Judgment normal.         Medications  Current Facility-Administered Medications   Medication Dose Route Frequency Provider Last Rate Last Admin    midodrine (Proamatine) tablet 5 mg  5 mg Oral TID WITH MEALS JOHN Grace.O.   5 mg at 11/15/23 1735    senna-docusate (Pericolace Or Senokot S) 8.6-50 MG per tablet 2 Tablet  2 Tablet Oral BID Mark Saul D.O.   2 Tablet at 11/16/23 0511    And    polyethylene glycol/lytes (Miralax) PACKET 1 Packet  1 Packet Oral QDAY PRN Mark Saul D.O.        And    magnesium hydroxide (Milk Of Magnesia) suspension 30 mL  30 mL Oral QDAY PRN Mark Saul D.O.        And    bisacodyl (Dulcolax) suppository 10 mg  10 mg Rectal QDAY PRN Mark Saul D.O.        LORazepam (Ativan) injection 0.5 mg  0.5 mg Intravenous Q4HRS PRN Cosme Traylor M.D.   0.5 mg at 11/15/23 1933    heparin infusion 25,000 units in 500 mL 0.45% NACL  0-30 Units/kg/hr Intravenous Continuous Misha Kirby M.D. 24 mL/hr at 11/16/23 0722 12 Units/kg/hr at 11/16/23 0722    heparin injection 4,000 Units  40 Units/kg Intravenous PRN Misha Kirby M.D.   4,000 Units at 11/13/23 2108    oxyCODONE CR (OxyCONTIN) tablet 10 mg  10 mg Oral Q12HRS Real Green M.D.   10 mg at 11/16/23 0511    HYDROmorphone (Dilaudid) injection 0.25 mg  0.25 mg Intravenous Q HOUR PRN Real Green M.D.   0.25 mg at 11/15/23 0339    MD Alert...ICU  Electrolyte Replacement per Pharmacy   Other PHARMACY TO DOSE Cosme Traylor M.D.        omeprazole (PriLOSEC) capsule 20 mg  20 mg Oral DAILY Vinnie Matias M.D.   20 mg at 11/16/23 0511    buPROPion SR (Wellbutrin-SR) tablet 150 mg  150 mg Oral QAM Vinnie Matias M.D.   150 mg at 11/16/23 0511    oxyCODONE immediate-release (Roxicodone) tablet 5 mg  5 mg Oral Q3HRS PRN Vinnie Matias M.D.   5 mg at 11/15/23 1616    Or    oxyCODONE immediate release (Roxicodone) tablet 10 mg  10 mg Oral Q3HRS PRN Vinnie Matias M.D.   10 mg at 11/15/23 0226    labetalol (Normodyne/Trandate) injection 10 mg  10 mg Intravenous Q4HRS PRN Vinnie Matias M.D.        ondansetron (Zofran) syringe/vial injection 4 mg  4 mg Intravenous Q4HRS PRN Vinnie Matias M.D.        ondansetron (Zofran ODT) dispertab 4 mg  4 mg Oral Q4HRS PRN Vinnie Matias M.D.        hydrOXYzine HCl (Atarax) tablet 25 mg  25 mg Oral TID PRN Vinnie Matias M.D.   25 mg at 11/11/23 2026       Fluids    Intake/Output Summary (Last 24 hours) at 11/16/2023 0735  Last data filed at 11/16/2023 0600  Gross per 24 hour   Intake 1083.98 ml   Output 1050 ml   Net 33.98 ml       Laboratory          Recent Labs     11/14/23  0610 11/15/23  0345 11/16/23  0447   SODIUM 130* 130* 131*   POTASSIUM 4.7 4.3 4.1   CHLORIDE 100 101 102   CO2 16* 19* 19*   BUN 43* 38* 35*   CREATININE 1.30 1.16 1.01   MAGNESIUM 2.2 2.3 2.1   PHOSPHORUS 4.3 2.9 2.7   CALCIUM 7.8* 7.8* 7.7*     Recent Labs     11/14/23  0610 11/15/23  0345 11/16/23  0447   ALTSGPT 131* 152* 183*   ASTSGOT 108* 148* 183*   ALKPHOSPHAT 668* 664* 710*   TBILIRUBIN 7.8* 7.6* 7.7*   GLUCOSE 135* 119* 102*     Recent Labs     11/14/23  0330 11/14/23  0610 11/15/23  0345 11/16/23  0447   WBC 11.9*  --  9.1 8.0   NEUTSPOLYS 79.30*  --  79.80* 79.30*   LYMPHOCYTES 9.50*  --  9.90* 9.90*   MONOCYTES 8.80  --  7.70 7.50   EOSINOPHILS 0.30  --  1.00 1.40   BASOPHILS 0.70  --  0.40 0.40   ASTSGOT  --  108* 148* 183*   ALTSGPT  --   131* 152* 183*   ALKPHOSPHAT  --  668* 664* 710*   TBILIRUBIN  --  7.8* 7.6* 7.7*     Recent Labs     11/13/23  0736 11/13/23  1030 11/13/23  1935 11/13/23  1935 11/14/23  0330 11/15/23  0345 11/16/23  0447   RBC  --   --  3.90*   < > 3.90* 3.56* 3.55*   HEMOGLOBIN  --   --  12.6*   < > 12.7* 11.4* 11.3*   HEMATOCRIT  --   --  37.0*   < > 36.7* 33.2* 34.0*   PLATELETCT  --   --  136*   < > 74* 134* 130*   PROTHROMBTM 20.7* 21.1* 22.7*  --   --   --   --    APTT 29.8 28.7 52.3*  --   --   --   --    INR 1.75* 1.79* 1.97*  --   --   --   --     < > = values in this interval not displayed.       Imaging  None    Assessment/Plan  * Shock (HCC)- (present on admission)  Assessment & Plan  Obstructive shock with right ventricular failure due to acute pulmonary embolism  Shock has resolved  He is off vasopressor support  Continue midodrine, 5 mg every 8 hours    Acute pulmonary embolism with acute cor pulmonale (HCC)  Assessment & Plan  CTA confirms bilateral pulmonary embolism with acute right heart failure  S/P EKOS catheter directed thrombolysis on 11/13  I am titrating heparin based upon serial anti-Xa determinations    Right ventricular failure (HCC)  Assessment & Plan  Echocardiogram with severely dilated RV and RV volume and pressure overload and reduced RV systolic function  Due to pulmonary embolism  RVSP 55 mmHg    Pancreatic adenocarcinoma (HCC)- (present on admission)  Assessment & Plan  Stage IV adenocarcinoma of the pancreas with evidence of pulmonary metastases and peritoneal carcinomatosis  Appreciate Dr. 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 In review of yesterday's note (11/15/2023), there are no changes except as documented above.     I have assessed and reassessed serial determinations of his anticoagulation status with the titration of heparin.  He is at increased risk for worsening hematologic system dysfunction.    Discussed patient condition and risk of morbidity and/or mortality with RN, RT, Pharmacy, Charge nurse / hot rounds, and QA team    The patient remains critically ill.  Critical care time = 35 minutes in directly providing and coordinating critical care and extensive data review.  No time overlap and excludes procedures.    Cosme Traylor MD  Pulmonary and Critical Care Medicine

## 2023-11-16 NOTE — PROGRESS NOTES
Inpatient Palliative Care     Location: [place] [address]     HPI:   Adam is seen lying in bed.  His color is much improved and he is down to less than a liter of oxygen.  He is not visibly short of breath and reports no dyspnea currently.  He remains on heparin drip, Levophed drip has been discontinued and he has been started on midodrine.  He has seen oncology/Dr. Frey yesterday and has been presented with options regarding future care.  He remains undecided regarding biliary stent and treatment versus hospice/comfort care measures.     Summary:  Ongoing discussion regarding treatment versus hospice and how pain is managed with either care path.  Presented to hospice options regarding city of residence.  Offered for representative to come speak with patient and family for information only.  Patient has questions regarding how his pain will be managed if he chooses treatment option.  We discussed switching OxyContin to more optimal long-acting medication per insurance/payment purposes.  He is agreeable to this plan after he moves out of the ICU.     Active listening, reflection, reminiscing, validation & normalization, and empathic support utilized throughout this encounter.  All questions answered and contact information provided, encouraged to call with any questions or concerns.      Plan:     1) PC APRN to continue to follow  2) plan to transition to other long-acting opioid for ease of insurance purposes/discharge.  3) plan to complete POLST prior to discharge.     Thank you for allowing me the opportunity to participate in the care of Adam.     I spent a total of 36 minutes reviewing medical records, direct face-to-face time with the patient and/or family, coordination of care, and documentation. This is separate from the time spent on advance care planning, which is documented above.     Elvia Schmitz, MSN, APRN, ACNPC-AG.  Palliative Care Nurse Practitioner  [phone]

## 2023-11-16 NOTE — CARE PLAN
The patient is Stable - Low risk of patient condition declining or worsening    Shift Goals  Clinical Goals: transfer to CNU  Patient Goals: rest/comfort  Family Goals: updates    Progress made toward(s) clinical / shift goals:  Updated on POC, transferring to CNU, medicated per MAR  Problem: Knowledge Deficit - Standard  Goal: Patient and family/care givers will demonstrate understanding of plan of care, disease process/condition, diagnostic tests and medications  Outcome: Progressing     Problem: Hemodynamics  Goal: Patient's hemodynamics, fluid balance and neurologic status will be stable or improve  Outcome: Met     Problem: Fluid Volume  Goal: Fluid volume balance will be maintained  Outcome: Progressing     Problem: Respiratory  Goal: Patient will achieve/maintain optimum respiratory ventilation and gas exchange  Outcome: Progressing     Problem: Mechanical Ventilation  Goal: Safe management of artificial airway and ventilation  Outcome: Met  Goal: Successful weaning off mechanical ventilator, spontaneously maintains adequate gas exchange  Outcome: Met  Goal: Patient will be able to express needs and understand communication  Outcome: Met     Problem: Pain - Standard  Goal: Alleviation of pain or a reduction in pain to the patient’s comfort goal  Outcome: Progressing     Problem: Skin Integrity  Goal: Skin integrity is maintained or improved  Outcome: Progressing     Problem: Fall Risk  Goal: Patient will remain free from falls  Outcome: Progressing       Patient is not progressing towards the following goals:None

## 2023-11-17 LAB
ALBUMIN SERPL BCP-MCNC: 2.4 G/DL (ref 3.2–4.9)
ALBUMIN/GLOB SERPL: 0.8 G/DL
ALP SERPL-CCNC: 828 U/L (ref 30–99)
ALT SERPL-CCNC: 234 U/L (ref 2–50)
ANION GAP SERPL CALC-SCNC: 10 MMOL/L (ref 7–16)
AST SERPL-CCNC: 233 U/L (ref 12–45)
BACTERIA BLD CULT: NORMAL
BACTERIA BLD CULT: NORMAL
BASOPHILS # BLD AUTO: 0.3 % (ref 0–1.8)
BASOPHILS # BLD: 0.03 K/UL (ref 0–0.12)
BILIRUB SERPL-MCNC: 7.9 MG/DL (ref 0.1–1.5)
BUN SERPL-MCNC: 29 MG/DL (ref 8–22)
CALCIUM ALBUM COR SERPL-MCNC: 9.4 MG/DL (ref 8.5–10.5)
CALCIUM SERPL-MCNC: 8.1 MG/DL (ref 8.5–10.5)
CHLORIDE SERPL-SCNC: 99 MMOL/L (ref 96–112)
CO2 SERPL-SCNC: 20 MMOL/L (ref 20–33)
CREAT SERPL-MCNC: 0.96 MG/DL (ref 0.5–1.4)
EOSINOPHIL # BLD AUTO: 0.11 K/UL (ref 0–0.51)
EOSINOPHIL NFR BLD: 1.2 % (ref 0–6.9)
ERYTHROCYTE [DISTWIDTH] IN BLOOD BY AUTOMATED COUNT: 54.2 FL (ref 35.9–50)
GFR SERPLBLD CREATININE-BSD FMLA CKD-EPI: 86 ML/MIN/1.73 M 2
GLOBULIN SER CALC-MCNC: 2.9 G/DL (ref 1.9–3.5)
GLUCOSE SERPL-MCNC: 110 MG/DL (ref 65–99)
HCT VFR BLD AUTO: 33.4 % (ref 42–52)
HGB BLD-MCNC: 11.7 G/DL (ref 14–18)
IMM GRANULOCYTES # BLD AUTO: 0.17 K/UL (ref 0–0.11)
IMM GRANULOCYTES NFR BLD AUTO: 1.9 % (ref 0–0.9)
LYMPHOCYTES # BLD AUTO: 0.78 K/UL (ref 1–4.8)
LYMPHOCYTES NFR BLD: 8.8 % (ref 22–41)
MAGNESIUM SERPL-MCNC: 2 MG/DL (ref 1.5–2.5)
MCH RBC QN AUTO: 32.7 PG (ref 27–33)
MCHC RBC AUTO-ENTMCNC: 35 G/DL (ref 32.3–36.5)
MCV RBC AUTO: 93.3 FL (ref 81.4–97.8)
MONOCYTES # BLD AUTO: 0.67 K/UL (ref 0–0.85)
MONOCYTES NFR BLD AUTO: 7.6 % (ref 0–13.4)
NEUTROPHILS # BLD AUTO: 7.08 K/UL (ref 1.82–7.42)
NEUTROPHILS NFR BLD: 80.2 % (ref 44–72)
NRBC # BLD AUTO: 0 K/UL
NRBC BLD-RTO: 0 /100 WBC (ref 0–0.2)
PHOSPHATE SERPL-MCNC: 2.7 MG/DL (ref 2.5–4.5)
PLATELET # BLD AUTO: 143 K/UL (ref 164–446)
PMV BLD AUTO: 10.1 FL (ref 9–12.9)
POTASSIUM SERPL-SCNC: 3.9 MMOL/L (ref 3.6–5.5)
PROT SERPL-MCNC: 5.3 G/DL (ref 6–8.2)
RBC # BLD AUTO: 3.58 M/UL (ref 4.7–6.1)
SIGNIFICANT IND 70042: NORMAL
SIGNIFICANT IND 70042: NORMAL
SITE SITE: NORMAL
SITE SITE: NORMAL
SODIUM SERPL-SCNC: 129 MMOL/L (ref 135–145)
SOURCE SOURCE: NORMAL
SOURCE SOURCE: NORMAL
UFH PPP CHRO-ACNC: 0.33 IU/ML
WBC # BLD AUTO: 8.8 K/UL (ref 4.8–10.8)

## 2023-11-17 PROCEDURE — 99233 SBSQ HOSP IP/OBS HIGH 50: CPT | Performed by: HOSPITALIST

## 2023-11-17 PROCEDURE — 85025 COMPLETE CBC W/AUTO DIFF WBC: CPT

## 2023-11-17 PROCEDURE — 770004 HCHG ROOM/CARE - ONCOLOGY PRIVATE *

## 2023-11-17 PROCEDURE — 97530 THERAPEUTIC ACTIVITIES: CPT

## 2023-11-17 PROCEDURE — 97535 SELF CARE MNGMENT TRAINING: CPT

## 2023-11-17 PROCEDURE — A9270 NON-COVERED ITEM OR SERVICE: HCPCS

## 2023-11-17 PROCEDURE — 700102 HCHG RX REV CODE 250 W/ 637 OVERRIDE(OP): Performed by: HOSPITALIST

## 2023-11-17 PROCEDURE — 700102 HCHG RX REV CODE 250 W/ 637 OVERRIDE(OP): Performed by: EMERGENCY MEDICINE

## 2023-11-17 PROCEDURE — A9270 NON-COVERED ITEM OR SERVICE: HCPCS | Performed by: HOSPITALIST

## 2023-11-17 PROCEDURE — 84100 ASSAY OF PHOSPHORUS: CPT

## 2023-11-17 PROCEDURE — 83735 ASSAY OF MAGNESIUM: CPT

## 2023-11-17 PROCEDURE — 85520 HEPARIN ASSAY: CPT

## 2023-11-17 PROCEDURE — 700111 HCHG RX REV CODE 636 W/ 250 OVERRIDE (IP): Performed by: RADIOLOGY

## 2023-11-17 PROCEDURE — 80053 COMPREHEN METABOLIC PANEL: CPT

## 2023-11-17 PROCEDURE — A9270 NON-COVERED ITEM OR SERVICE: HCPCS | Performed by: EMERGENCY MEDICINE

## 2023-11-17 PROCEDURE — 36415 COLL VENOUS BLD VENIPUNCTURE: CPT

## 2023-11-17 PROCEDURE — 700102 HCHG RX REV CODE 250 W/ 637 OVERRIDE(OP)

## 2023-11-17 RX ADMIN — OMEPRAZOLE 20 MG: 20 CAPSULE, DELAYED RELEASE ORAL at 05:15

## 2023-11-17 RX ADMIN — MIDODRINE HYDROCHLORIDE 5 MG: 5 TABLET ORAL at 13:55

## 2023-11-17 RX ADMIN — HYDROXYZINE HYDROCHLORIDE 25 MG: 25 TABLET, FILM COATED ORAL at 18:29

## 2023-11-17 RX ADMIN — HEPARIN SODIUM 12 UNITS/KG/HR: 5000 INJECTION, SOLUTION INTRAVENOUS at 19:19

## 2023-11-17 RX ADMIN — OXYCODONE HYDROCHLORIDE 10 MG: 10 TABLET ORAL at 17:01

## 2023-11-17 RX ADMIN — MIDODRINE HYDROCHLORIDE 5 MG: 5 TABLET ORAL at 20:30

## 2023-11-17 RX ADMIN — MIDODRINE HYDROCHLORIDE 5 MG: 5 TABLET ORAL at 09:14

## 2023-11-17 RX ADMIN — BUPROPION HYDROCHLORIDE 150 MG: 150 TABLET, EXTENDED RELEASE ORAL at 05:15

## 2023-11-17 RX ADMIN — OXYCODONE HYDROCHLORIDE 10 MG: 10 TABLET, FILM COATED, EXTENDED RELEASE ORAL at 17:01

## 2023-11-17 RX ADMIN — HYDROXYZINE HYDROCHLORIDE 25 MG: 25 TABLET, FILM COATED ORAL at 21:44

## 2023-11-17 RX ADMIN — OXYCODONE HYDROCHLORIDE 10 MG: 10 TABLET ORAL at 23:32

## 2023-11-17 RX ADMIN — OXYCODONE HYDROCHLORIDE 10 MG: 10 TABLET, FILM COATED, EXTENDED RELEASE ORAL at 05:15

## 2023-11-17 ASSESSMENT — PAIN DESCRIPTION - PAIN TYPE
TYPE: ACUTE PAIN
TYPE: ACUTE PAIN

## 2023-11-17 ASSESSMENT — COGNITIVE AND FUNCTIONAL STATUS - GENERAL
PERSONAL GROOMING: A LITTLE
MOVING FROM LYING ON BACK TO SITTING ON SIDE OF FLAT BED: A LOT
SUGGESTED CMS G CODE MODIFIER DAILY ACTIVITY: CJ
MOBILITY SCORE: 16
WALKING IN HOSPITAL ROOM: A LITTLE
HELP NEEDED FOR BATHING: A LITTLE
TURNING FROM BACK TO SIDE WHILE IN FLAT BAD: A LITTLE
DAILY ACTIVITIY SCORE: 20
TOILETING: A LITTLE
STANDING UP FROM CHAIR USING ARMS: A LITTLE
CLIMB 3 TO 5 STEPS WITH RAILING: A LOT
MOVING TO AND FROM BED TO CHAIR: A LITTLE
SUGGESTED CMS G CODE MODIFIER MOBILITY: CK
DRESSING REGULAR LOWER BODY CLOTHING: A LITTLE

## 2023-11-17 ASSESSMENT — GAIT ASSESSMENTS
DEVIATION: BRADYKINETIC
DISTANCE (FEET): 15
GAIT LEVEL OF ASSIST: CONTACT GUARD ASSIST
ASSISTIVE DEVICE: FRONT WHEEL WALKER

## 2023-11-17 NOTE — CARE PLAN
The patient is Watcher - Medium risk of patient condition declining or worsening    Shift Goals  Clinical Goals: monitor pt's bp  Patient Goals: rest/comfort  Family Goals: kept informed with updates    Progress made toward(s) clinical / shift goals:        Problem: Knowledge Deficit - Standard  Goal: Patient and family/care givers will demonstrate understanding of plan of care, disease process/condition, diagnostic tests and medications  Outcome: Progressing       Problem: Hemodynamics  Goal: Patient's hemodynamics, fluid balance and neurologic status will be stable or improve  Outcome: Progressing     Problem: Respiratory  Goal: Patient will achieve/maintain optimum respiratory ventilation and gas exchange  Outcome: Progressing

## 2023-11-17 NOTE — DISCHARGE PLANNING
Case Management Discharge Planning    Admission Date: 11/11/2023  GMLOS: 8.2  ALOS: 6    6-Clicks ADL Score: 20  6-Clicks Mobility Score: 17  PT and/or OT Eval ordered: Yes  Post-acute Referrals Ordered: Yes  Post-acute Choice Obtained: Yes  Has referral(s) been sent to post-acute provider:  Yes      Anticipated Discharge Dispo: Discharge Disposition: D/T to SNF with Medicare cert in anticipation of skilled care (03)  Discharge Address: [address] [place] [address]    DME Needed: No    Action(s) Taken: Pt was discussed in IDT rounds; pt is still trying to decide if he will go home with hospice or get a stent. [facility] came to bedside and explained hospice to pt and pt's wife.     Pending pt's decision.    Escalations Completed: None    Medically Clear: No    Next Steps: f/u with pt    Barriers to Discharge: Medical clearance    Is the patient up for discharge tomorrow: No

## 2023-11-17 NOTE — PROGRESS NOTES
[facility] Medicine Daily Progress Note    Date of Service  11/17/2023    Chief Complaint  Adam Yan is a 68 y.o. male admitted 11/11/2023 with shortness of breath.    Hospital Course  Mr. Yan is a 68 y.o. male who presented 11/11/2023 with past medical history of pancreatic adenocarcinoma stage IV followed by Dr. Hatch oncology, hypertension who presents to the hospital for generalized weakness, fatigue and jaundice for 1 week prior to admit.  He also complains of shortness of breath on exertion.  Patient had was diagnosed with pancreatic carcinoma on 10/31 after an ERCP.  During admit he a CTA chest showing bilateral pulmonary emboli, CBD dilation, pancreatic head mass, and a doppler lower extremity with DVT.  An Echo showed acute right heart strain. He required admit to the ICU for for shock (hypovolemic and septic/distributive) and was on pressors.  He was jaundiced and labs suggest mechanical obstruction. On 11/13 he had EKOS with improvement.  GI was consulted for consideration of biliary stent.  Oncology consulted and due to hyperbilirubinemia he is not a current chemo candidate.       Interval Problem Update  11/16:  He is off norepinephrine.  Transferred to oncology.  Arcenio reviewed his notes.  I reviewed imaging with he and his wife showing them his lungs with miliary tumors, and pancreatic head mass.  Discussion over ERCP with pros/cons discussed.  He and his wife were made aware that whatever decision is made that he should have hospice arranged.  He has been too weak to walk and may need SNF in [place] prior to return home.   11/17: Mr. Yan was seen and evaluated on the oncology floor. His bilirubin has crept up to 7.9 and AST, ALT, alk phos have all gone up as well.  I had a long talk with him about the possible ERCP with stenting versus no further intervention and simply going home on hospice.  He is very emotional and struggling with his new terminal diagnosis.  At this point  in time he remains on IV heparin drip in case he wants it and will make a decision in the morning.  I did discuss with  gastroenterology.    I have discussed this patient's plan of care and discharge plan at IDT rounds today with Case Management, Nursing, Nursing leadership, and other members of the IDT team.    Consultants/Specialty  Critical care  Oncology  GI    Code Status  DNAR/DNI    Disposition  The patient is not medically cleared for discharge to home or a post-acute facility.  Anticipate discharge to: hospice    I have placed the appropriate orders for post-discharge needs.    Review of Systems  Review of Systems   Constitutional:         Generally weak   Gastrointestinal:         For appetite   All other systems reviewed and are negative.       Physical Exam  Temp:  [36.1 °C (96.9 °F)-36.7 °C (98 °F)] 36.5 °C (97.7 °F)  Pulse:  [72-77] 75  Resp:  [18-20] 18  BP: (100-113)/(58-74) 108/67  SpO2:  [91 %-93 %] 93 %    Physical Exam  Vitals and nursing note reviewed.   Constitutional:       General: He is not in acute distress.     Appearance: He is ill-appearing. He is not toxic-appearing.   HENT:      Mouth/Throat:      Mouth: Mucous membranes are dry.   Eyes:      General: Scleral icterus present.   Cardiovascular:      Rate and Rhythm: Normal rate and regular rhythm.   Abdominal:      General: There is distension.      Tenderness: There is no abdominal tenderness.   Skin:     Coloration: Skin is jaundiced.   Neurological:      Mental Status: He is alert.         Fluids    Intake/Output Summary (Last 24 hours) at 11/17/2023 0723  Last data filed at 11/17/2023 0600  Gross per 24 hour   Intake 387.99 ml   Output 625 ml   Net -237.01 ml       Laboratory  Recent Labs     11/15/23  0345 11/16/23  0447 11/17/23  0503   WBC 9.1 8.0 8.8   RBC 3.56* 3.55* 3.58*   HEMOGLOBIN 11.4* 11.3* 11.7*   HEMATOCRIT 33.2* 34.0* 33.4*   MCV 93.3 95.8 93.3   MCH 32.0 31.8 32.7   MCHC 34.3 33.2 35.0   RDW 53.0* 56.7*  54.2*   PLATELETCT 134* 130* 143*   MPV 9.8 10.1 10.1     Recent Labs     11/15/23  0345 11/16/23  0447 11/17/23  0503   SODIUM 130* 131* 129*   POTASSIUM 4.3 4.1 3.9   CHLORIDE 101 102 99   CO2 19* 19* 20   GLUCOSE 119* 102* 110*   BUN 38* 35* 29*   CREATININE 1.16 1.01 0.96   CALCIUM 7.8* 7.7* 8.1*                   Imaging  IR-PULMONARY ARTERY-R-L SELECTIVE RIGHT   Final Result      1.  Pulmonary hypertension as recorded above.   2.  BILATERAL selective pulmonary arteriograms most notable for large distal right main pulmonary artery clot filling defect concordant with CTA findings.   3.  Placement of BILATERAL EKOS catheters   4.  Initiation of bilateral thrombolytic infusion with EKOS acoustic pulse thrombolysis therapy (US-assisted thrombolysis) for duration 4 hours      US-EXTREMITY VENOUS LOWER BILAT   Final Result      CT-CTA CHEST PULMONARY ARTERY W/ RECONS   Final Result      1.  Extensive bilateral pulmonary emboli involving the main pulmonary arteries, segmental, and subsegmental branches. RV strain noted.      2.  Diffuse metastatic disease throughout the lung fields.      3.  Confluent masslike infiltrates in the periphery of the lung fields bilaterally consistent with metastatic disease and/or pneumonitis.      4.  Mild to moderate bilateral pleural effusions.            EC-ECHOCARDIOGRAM COMPLETE W/ CONT   Final Result      CT-ABDOMEN-PELVIS WITH   Final Result         1. 3 cm ill-defined pancreatic mass, which could represent pancreatic adenocarcinoma or cholangiocarcinoma. There is encasement of the adjacent portal, splenic, and mesenteric veins. There is common bile duct dilation above the level of the lesion.   2. There are several peritoneal nodules, largest measuring 4.1 cm. There is also ascites. Therefore, consistent with peritoneal carcinomatosis.   3. Innumerable lower lung zone nodules, which could represent metastatic disease or a benign process.   4. Liver appears cirrhotic.   5. Density  within the gallbladder, as above.   6. Simple small right renal cyst, which does not require follow-up.      DX-CHEST-PORTABLE (1 VIEW)   Final Result      Hypoinflation with prominent interstitial and numerous small ill-defined pulmonary nodules bilaterally may indicate granulomatous disease, pneumonitis or neoplastic process.           Assessment/Plan  * Acute pulmonary embolism with acute cor pulmonale (HCC)  Assessment & Plan  S/P EKOS 11/13  Heparin drip until finalized plan for ERCP or not.  Then possible home on [facility]    Pancreatic adenocarcinoma (HCC)- (present on admission)  Assessment & Plan  Diagnosed on 10/31  Metastatic to the lungs  Pain control  The patient has not started chemotherapy  Palliative consult  Follows Dr. Hatch as oncology in [place]  Dr Frey's note reviewed and no current options given his hyperbilirubinemia    Thrombocytopenia (HCC)  Assessment & Plan  Due to metastatic adenocarcinoma  11/11 Plt:130 <134<74 <126    Hyponatremia- (present on admission)  Assessment & Plan  Hypovolemia hyponatremia  IV fluids  Monitor labs as needed  11/16 Na:131    Right ventricular failure (HCC)  Assessment & Plan  Due to pulmonary embolism.  Echo reviewed.  S/P Ekos 11/13    Goals of care, counseling/discussion  Assessment & Plan  DNR/DNI  Hospice encouraged upon discharge    Shock (HCC)- (present on admission)  Assessment & Plan  Norepinephrine stopped and off 11/16.  Monitor vitals.  Oral intake as tolerates    Acute kidney injury (HCC)- (present on admission)  Assessment & Plan  Likely prerenal  IV fluid hydration   Creatinine normalized      Biliary obstruction- (present on admission)  Assessment & Plan  Secondary to an pancreatic mass  He has not made his decision as to whether he wants an ERCP with stenting versus simply home on hospice with palliative care  GI consulted for possible ERCP for stent (heparin will need to be held 4 hours prior to procedure)  Pain control  No evidence of  fevers or signs of cholangitis    Primary hypertension- (present on admission)  Assessment & Plan  Monitor vitals.  Was on Levophed up until 11/16am.  Restart oral BP meds as vitals require.    DVT (deep venous thrombosis) (HCC)- (present on admission)  Assessment & Plan  Heparin drip         VTE prophylaxis:    therapeutic anticoagulation with weight-based heparin gtt, pharmacy to adjust PRN      I have performed a physical exam and reviewed and updated ROS and Plan today (11/17/2023). In review of yesterday's note (11/16/2023), there are no changes except as documented above.

## 2023-11-17 NOTE — ASSESSMENT & PLAN NOTE
Norepinephrine stopped and off 11/16.  He has been on midodrine which will be stopped on 11/18/2023 and his blood pressure has maintained.

## 2023-11-17 NOTE — THERAPY
"Occupational Therapy  Daily Treatment     Patient Name: Adam Yan  Age:  68 y.o., Sex:  male  Medical Record #: [id number]  Today's Date: 11/17/2023     Precautions  Precautions: Fall Risk    Assessment    Pt seen for OT tx session. Pt demo'd improved ADL participation, able to perform standing grooming w/ SBA and don socks w/ SPV. Pt required SBA for functional mobility and cues for pacing. Will continue to follow while in house.     Plan    Treatment Plan Status: (P) Continue Current Treatment Plan  Type of Treatment: Self Care / Activities of Daily Living, Neuro Re-Education / Balance, Therapeutic Exercises, Therapeutic Activity, Adaptive Equipment  Treatment Frequency: 3 Times per Week  Treatment Duration: Until Therapy Goals Met    DC Equipment Recommendations: (P) Unable to determine at this time  Discharge Recommendations: (P) Home Health     Subjective    \"I am feeling better today\"     Objective       11/17/23 1051   Treatment Charges   Charges Yes   OT Self Care / ADL (Units) 2   Total Time Spent   OT Self Care / ADL (Minutes) 24   OT Total Time Spent (Calculated) 24   Precautions   Precautions Fall Risk   Vitals   O2 (LPM) 1   O2 Delivery Device Silicone Nasal Cannula   Pain 0 - 10 Group   Therapist Pain Assessment Post Activity Pain Same as Prior to Activity;Nurse Notified  (no c/o pain during session)   Active ROM Upper Body   Active ROM Upper Body  WDL   Strength Upper Body   Upper Body Strength  WDL   Balance   Sitting Balance (Static) Fair +   Sitting Balance (Dynamic) Fair   Standing Balance (Static) Fair -   Standing Balance (Dynamic) Fair -   Weight Shift Sitting Good   Weight Shift Standing Fair   Skilled Intervention Verbal Cuing   Comments w/ FWW   Bed Mobility    Supine to Sit Supervised   Sit to Supine   (NT in chair post)   Scooting Standby Assist   Skilled Intervention Verbal Cuing   Activities of Daily Living   Grooming Standby Assist;Standing  (~30 seconds)   Upper Body Dressing " Supervision   Lower Body Dressing Supervision  (donned socks)   Toileting   (declined need)   Skilled Intervention Verbal Cuing;Tactile Cuing;Facilitation   How much help from another person does the patient currently need...   Putting on and taking off regular lower body clothing? 3   Bathing (including washing, rinsing, and drying)? 3   Toileting, which includes using a toilet, bedpan, or urinal? 3   Putting on and taking off regular upper body clothing? 4   Taking care of personal grooming such as brushing teeth? 3   Eating meals? 4   6 Clicks Daily Activity Score 20   Functional Mobility   Sit to Stand Standby Assist   Bed, Chair, Wheelchair Transfer Standby Assist   Mobility household distance w/ FWW   Skilled Intervention Verbal Cuing;Tactile Cuing;Facilitation   Activity Tolerance   Sitting in Chair 10 min   Sitting Edge of Bed 5 min   Standing 5 min   Patient / Family Goals   Patient / Family Goal #1 To go home   Short Term Goals   Short Term Goal # 1 Pt will demo LB dressing w/ SPV   Goal Outcome # 1 Progressing as expected  (pants not trialed)   Short Term Goal # 2 Pt will demo standing grooming w/ SPV   Goal Outcome # 2 Progressing as expected   Short Term Goal # 3 Pt will demo toilet txf w/ SPV   Goal Outcome # 3 Progressing as expected   Education Group   Role of Occupational Therapist Patient Response Patient;Acceptance;Explanation;Demonstration;Action Demonstration;Verbal Demonstration;Family   ADL Patient Response Patient;Family;Acceptance;Explanation;Demonstration;Verbal Demonstration   Additional Comments ed/trained pt and spouse on importance of pt performing BADLs to increase overall strength.   Occupational Therapy Treatment Plan    O.T. Treatment Plan Continue Current Treatment Plan   Anticipated Discharge Equipment and Recommendations   DC Equipment Recommendations Unable to determine at this time   Discharge Recommendations Home Health    Interdisciplinary Plan of Care Collaboration   IDT  Collaboration with  Nursing   Patient Position at End of Therapy Seated;Call Light within Reach;Tray Table within Reach;Phone within Reach;Family / Friend in Room   Collaboration Comments report given   Session Information   Date / Session Number  11/17, 2 (2/3, 11/21)

## 2023-11-17 NOTE — CARE PLAN
The patient is Stable - Low risk of patient condition declining or worsening    Shift Goals  Clinical Goals: MAP will remain at or above 65. Patient will not have S/S of bleeding  Patient Goals: Discuss stent placement with MD  Family Goals: Updates. Discuss POC with MD    Progress made toward(s) clinical / shift goals:      Problem: Knowledge Deficit - Standard  Goal: Patient and family/care givers will demonstrate understanding of plan of care, disease process/condition, diagnostic tests and medications  Outcome: Progressing  Note: Discussed POC with patient and primary team. Addressed patient and family questions about medications and potential stent placement.      Problem: Fluid Volume  Goal: Fluid volume balance will be maintained  Outcome: Progressing  Note: Heparin gtt in place. VSS. Midodrine administered per MAR; MAP remains above 65. Poor PO intake observed     Problem: Respiratory  Goal: Patient will achieve/maintain optimum respiratory ventilation and gas exchange  Outcome: Progressing  Note: Patient remains on supplemental O2. Titration to room air in place as appropriate.      Problem: Pain - Standard  Goal: Alleviation of pain or a reduction in pain to the patient’s comfort goal  Outcome: Progressing  Note: Minimal request for pain meds this shift. Patient communicates needs well. Pain treated PRN x1 this shift on CNU     Problem: Fall Risk  Goal: Patient will remain free from falls  Outcome: Progressing  Note: Fall precautions in place. Patient calls appropriately to make needs known. Up with 1PA and FWW       Patient is not progressing towards the following goals: NA

## 2023-11-17 NOTE — PROGRESS NOTES
Hospital Medicine Daily Progress Note    Date of Service  11/16/2023    Chief Complaint  Short of breath and weakness    Hospital Course  Adam Yan is a 68 y.o. male who presented 11/11/2023 with past medical history of pancreatic adenocarcinoma stage IV, hypertension who presents to the hospital for generalized weakness, fatigue and jaundice for 1 week prior to admit.  He also complains of shortness of breath on exertion.  Patient had was diagnosed with pancreatic carcinoma on 10/31 after an ERCP.  During admit he a CTA chest showing bilateral pulmonary emboli, CBD dilation, pancreatic head mass, and a doppler lower extremity with DVT.  An Echo showed acute right heart strain. He required admit to the ICU for for shock (hypovolemic and septic/distributive) and was on pressors.  He was jaundiced and labs suggest mechanical obstruction. On 11/13 he had EKOS with improvement.  GI was consulted for consideration of biliary stent.  Oncology consulted and due to hyperbilirubinemia he is not a current chemo candidate.       Interval Problem Update  11/16:  He is off norepinephrine.  Transferred to oncology.  Arcenio reviewed his notes.  I reviewed imaging with he and his wife showing them his lungs with miliary tumors, and pancreatic head mass.  Discussion over ERCP with pros/cons discussed.  He and his wife were made aware that whatever decision is made that he should have hospice arranged.  He has been too weak to walk and may need SNF in [place] prior to return home.        I have discussed this patient's plan of care and discharge plan at IDT rounds today with Case Management, Nursing, Nursing leadership, and other members of the IDT team.    Consultants/Specialty  critical care, GI, and oncology    Code Status  DNAR/DNI    Disposition  The patient is not medically cleared for discharge to home or a post-acute facility.  Anticipate discharge to: hospice    I have placed the appropriate orders for  Not applicable post-discharge needs.    Review of Systems  Review of Systems   Constitutional:  Positive for malaise/fatigue. Negative for chills and fever.   Respiratory:  Negative for shortness of breath and stridor.    Cardiovascular:  Positive for leg swelling. Negative for palpitations.   Gastrointestinal:  Positive for abdominal pain. Negative for diarrhea and nausea.   Genitourinary:  Negative for dysuria.   Musculoskeletal:  Negative for back pain and joint pain.   Neurological:  Negative for dizziness, speech change and headaches.   Psychiatric/Behavioral:  The patient is not nervous/anxious.         Physical Exam  Temp:  [35.9 °C (96.6 °F)-36.5 °C (97.7 °F)] 36.5 °C (97.7 °F)  Pulse:  [69-81] 75  Resp:  [18-22] 18  BP: ()/(56-74) 103/65  SpO2:  [89 %-95 %] 91 %    Physical Exam  Vitals reviewed.   Constitutional:       Appearance: Normal appearance. He is ill-appearing. He is not diaphoretic.   HENT:      Head: Normocephalic and atraumatic.      Nose: Nose normal.      Mouth/Throat:      Mouth: Mucous membranes are moist.      Pharynx: No oropharyngeal exudate.   Eyes:      General: Scleral icterus present.         Right eye: No discharge.         Left eye: No discharge.      Extraocular Movements: Extraocular movements intact.      Conjunctiva/sclera: Conjunctivae normal.   Cardiovascular:      Rate and Rhythm: Normal rate and regular rhythm.      Pulses:           Radial pulses are 2+ on the right side and 2+ on the left side.        Dorsalis pedis pulses are 2+ on the right side and 2+ on the left side.      Heart sounds: No murmur heard.  Pulmonary:      Effort: Pulmonary effort is normal. No respiratory distress.      Breath sounds: Normal breath sounds. No wheezing or rales.   Abdominal:      General: Bowel sounds are normal. There is no distension.      Palpations: Abdomen is soft.      Tenderness: There is no abdominal tenderness.   Musculoskeletal:         General: Swelling (R>L leg) present. No  tenderness.      Cervical back: Neck supple. No muscular tenderness.      Right lower leg: No edema.      Left lower leg: No edema.   Lymphadenopathy:      Cervical: No cervical adenopathy.   Skin:     Coloration: Skin is not jaundiced or pale.   Neurological:      General: No focal deficit present.      Mental Status: He is alert and oriented to person, place, and time. Mental status is at baseline.      Cranial Nerves: No cranial nerve deficit.   Psychiatric:         Mood and Affect: Mood normal.         Behavior: Behavior normal.         Fluids    Intake/Output Summary (Last 24 hours) at 11/16/2023 1957  Last data filed at 11/16/2023 1443  Gross per 24 hour   Intake 767.82 ml   Output 825 ml   Net -57.18 ml       Laboratory  Recent Labs     11/14/23  0330 11/15/23  0345 11/16/23 0447   WBC 11.9* 9.1 8.0   RBC 3.90* 3.56* 3.55*   HEMOGLOBIN 12.7* 11.4* 11.3*   HEMATOCRIT 36.7* 33.2* 34.0*   MCV 94.1 93.3 95.8   MCH 32.6 32.0 31.8   MCHC 34.6 34.3 33.2   RDW 53.8* 53.0* 56.7*   PLATELETCT 74* 134* 130*   MPV 11.3 9.8 10.1     Recent Labs     11/14/23  0610 11/15/23  0345 11/16/23 0447   SODIUM 130* 130* 131*   POTASSIUM 4.7 4.3 4.1   CHLORIDE 100 101 102   CO2 16* 19* 19*   GLUCOSE 135* 119* 102*   BUN 43* 38* 35*   CREATININE 1.30 1.16 1.01   CALCIUM 7.8* 7.8* 7.7*                   Imaging  IR-PULMONARY ARTERY-R-L SELECTIVE RIGHT   Final Result      1.  Pulmonary hypertension as recorded above.   2.  BILATERAL selective pulmonary arteriograms most notable for large distal right main pulmonary artery clot filling defect concordant with CTA findings.   3.  Placement of BILATERAL EKOS catheters   4.  Initiation of bilateral thrombolytic infusion with EKOS acoustic pulse thrombolysis therapy (US-assisted thrombolysis) for duration 4 hours      US-EXTREMITY VENOUS LOWER BILAT   Final Result      CT-CTA CHEST PULMONARY ARTERY W/ RECONS   Final Result      1.  Extensive bilateral pulmonary emboli involving the main  pulmonary arteries, segmental, and subsegmental branches. RV strain noted.      2.  Diffuse metastatic disease throughout the lung fields.      3.  Confluent masslike infiltrates in the periphery of the lung fields bilaterally consistent with metastatic disease and/or pneumonitis.      4.  Mild to moderate bilateral pleural effusions.            EC-ECHOCARDIOGRAM COMPLETE W/ CONT   Final Result      CT-ABDOMEN-PELVIS WITH   Final Result         1. 3 cm ill-defined pancreatic mass, which could represent pancreatic adenocarcinoma or cholangiocarcinoma. There is encasement of the adjacent portal, splenic, and mesenteric veins. There is common bile duct dilation above the level of the lesion.   2. There are several peritoneal nodules, largest measuring 4.1 cm. There is also ascites. Therefore, consistent with peritoneal carcinomatosis.   3. Innumerable lower lung zone nodules, which could represent metastatic disease or a benign process.   4. Liver appears cirrhotic.   5. Density within the gallbladder, as above.   6. Simple small right renal cyst, which does not require follow-up.      DX-CHEST-PORTABLE (1 VIEW)   Final Result      Hypoinflation with prominent interstitial and numerous small ill-defined pulmonary nodules bilaterally may indicate granulomatous disease, pneumonitis or neoplastic process.           Assessment/Plan  * Shock (HCC)- (present on admission)  Assessment & Plan  Norepinephrine stopped and off 11/16.  Monitor vitals.  Oral intake as tolerates    Thrombocytopenia (HCC)  Assessment & Plan  Due to metastatic adenocarcinoma  11/11 Plt:130 <134<74 <126    Hyponatremia- (present on admission)  Assessment & Plan  Hypovolemia hyponatremia  IV fluids  Monitor labs as needed  11/16 Na:131    Acute pulmonary embolism with acute cor pulmonale (HCC)  Assessment & Plan  S/P EKOS 11/13  Heparin drip until finalized plan for ERCP or not.  Then possible DOAC or lovenox.    Right ventricular failure  (HCC)  Assessment & Plan  Due to pulmonary embolism.  Echo reviewed.  S/P Ekos 11/13    Goals of care, counseling/discussion  Assessment & Plan  DNR/DNI  Hospice encouraged upon discharge    Acute kidney injury (HCC)- (present on admission)  Assessment & Plan  Likely prerenal  IV fluid hydration   Monitor BMP and assess response  Avoid IV contrast/nephrotoxins/NSAIDs  Dose adjust meds for decreased GFR  Last labs from 11/11 Cr:1.63      Pancreatic adenocarcinoma (HCC)- (present on admission)  Assessment & Plan  Diagnosed on 10/31  Metastatic to the lungs  Pain control  The patient has not started chemotherapy  Palliative consult  Follows Dr. Hatch as oncology in [place]  Dr Frey's note reviewed and no current options given his hyperbilirubinemia    Biliary obstruction- (present on admission)  Assessment & Plan  Secondary to an pancreatic mass  GI consulting for possible ERCP for stent (heparin will need to be held 4 hours prior to procedure)  Pain control  No evidence of fevers or signs of cholangitis    Primary hypertension- (present on admission)  Assessment & Plan  Monitor vitals.  Was on Levophed up until 11/16am.  Restart oral BP meds as vitals require.    DVT (deep venous thrombosis) (HCC)- (present on admission)  Assessment & Plan  Heparin drip         VTE prophylaxis:    therapeutic anticoagulation with weight-based heparin gtt, pharmacy to adjust PRN      I have performed a physical exam and reviewed and updated ROS and Plan today (11/16/2023). In review of yesterday's note (11/15/2023), there are no changes except as documented above.

## 2023-11-17 NOTE — DISCHARGE PLANNING
Received choice form @: No choice  Agency/Facility name: [facility]  Sent referral per choice form @: 0085

## 2023-11-18 LAB
ALBUMIN SERPL BCP-MCNC: 2.6 G/DL (ref 3.2–4.9)
ALBUMIN/GLOB SERPL: 0.9 G/DL
ALP SERPL-CCNC: 948 U/L (ref 30–99)
ALT SERPL-CCNC: 283 U/L (ref 2–50)
ANION GAP SERPL CALC-SCNC: 11 MMOL/L (ref 7–16)
AST SERPL-CCNC: 307 U/L (ref 12–45)
BASOPHILS # BLD AUTO: 0.5 % (ref 0–1.8)
BASOPHILS # BLD: 0.05 K/UL (ref 0–0.12)
BILIRUB SERPL-MCNC: 8.8 MG/DL (ref 0.1–1.5)
BUN SERPL-MCNC: 26 MG/DL (ref 8–22)
CALCIUM ALBUM COR SERPL-MCNC: 9.2 MG/DL (ref 8.5–10.5)
CALCIUM SERPL-MCNC: 8.1 MG/DL (ref 8.5–10.5)
CHLORIDE SERPL-SCNC: 98 MMOL/L (ref 96–112)
CO2 SERPL-SCNC: 21 MMOL/L (ref 20–33)
CREAT SERPL-MCNC: 0.89 MG/DL (ref 0.5–1.4)
EOSINOPHIL # BLD AUTO: 0.08 K/UL (ref 0–0.51)
EOSINOPHIL NFR BLD: 0.9 % (ref 0–6.9)
ERYTHROCYTE [DISTWIDTH] IN BLOOD BY AUTOMATED COUNT: 58.6 FL (ref 35.9–50)
GFR SERPLBLD CREATININE-BSD FMLA CKD-EPI: 93 ML/MIN/1.73 M 2
GLOBULIN SER CALC-MCNC: 2.8 G/DL (ref 1.9–3.5)
GLUCOSE SERPL-MCNC: 104 MG/DL (ref 65–99)
HCT VFR BLD AUTO: 33.1 % (ref 42–52)
HGB BLD-MCNC: 11.5 G/DL (ref 14–18)
IMM GRANULOCYTES # BLD AUTO: 0.23 K/UL (ref 0–0.11)
IMM GRANULOCYTES NFR BLD AUTO: 2.5 % (ref 0–0.9)
LYMPHOCYTES # BLD AUTO: 1.04 K/UL (ref 1–4.8)
LYMPHOCYTES NFR BLD: 11.3 % (ref 22–41)
MAGNESIUM SERPL-MCNC: 1.9 MG/DL (ref 1.5–2.5)
MCH RBC QN AUTO: 33.4 PG (ref 27–33)
MCHC RBC AUTO-ENTMCNC: 34.7 G/DL (ref 32.3–36.5)
MCV RBC AUTO: 96.2 FL (ref 81.4–97.8)
MONOCYTES # BLD AUTO: 0.65 K/UL (ref 0–0.85)
MONOCYTES NFR BLD AUTO: 7.1 % (ref 0–13.4)
NEUTROPHILS # BLD AUTO: 7.12 K/UL (ref 1.82–7.42)
NEUTROPHILS NFR BLD: 77.7 % (ref 44–72)
NRBC # BLD AUTO: 0 K/UL
NRBC BLD-RTO: 0 /100 WBC (ref 0–0.2)
PHOSPHATE SERPL-MCNC: 3 MG/DL (ref 2.5–4.5)
PLATELET # BLD AUTO: 154 K/UL (ref 164–446)
PMV BLD AUTO: 9.9 FL (ref 9–12.9)
POTASSIUM SERPL-SCNC: 4.1 MMOL/L (ref 3.6–5.5)
PROT SERPL-MCNC: 5.4 G/DL (ref 6–8.2)
RBC # BLD AUTO: 3.44 M/UL (ref 4.7–6.1)
SODIUM SERPL-SCNC: 130 MMOL/L (ref 135–145)
UFH PPP CHRO-ACNC: 0.37 IU/ML
WBC # BLD AUTO: 9.2 K/UL (ref 4.8–10.8)

## 2023-11-18 PROCEDURE — 36415 COLL VENOUS BLD VENIPUNCTURE: CPT

## 2023-11-18 PROCEDURE — 80053 COMPREHEN METABOLIC PANEL: CPT

## 2023-11-18 PROCEDURE — A9270 NON-COVERED ITEM OR SERVICE: HCPCS | Performed by: HOSPITALIST

## 2023-11-18 PROCEDURE — 83735 ASSAY OF MAGNESIUM: CPT

## 2023-11-18 PROCEDURE — A9270 NON-COVERED ITEM OR SERVICE: HCPCS

## 2023-11-18 PROCEDURE — 700102 HCHG RX REV CODE 250 W/ 637 OVERRIDE(OP): Performed by: HOSPITALIST

## 2023-11-18 PROCEDURE — A9270 NON-COVERED ITEM OR SERVICE: HCPCS | Performed by: EMERGENCY MEDICINE

## 2023-11-18 PROCEDURE — 84100 ASSAY OF PHOSPHORUS: CPT

## 2023-11-18 PROCEDURE — 700102 HCHG RX REV CODE 250 W/ 637 OVERRIDE(OP): Performed by: EMERGENCY MEDICINE

## 2023-11-18 PROCEDURE — 85520 HEPARIN ASSAY: CPT

## 2023-11-18 PROCEDURE — 770004 HCHG ROOM/CARE - ONCOLOGY PRIVATE *

## 2023-11-18 PROCEDURE — 700102 HCHG RX REV CODE 250 W/ 637 OVERRIDE(OP)

## 2023-11-18 PROCEDURE — 99233 SBSQ HOSP IP/OBS HIGH 50: CPT | Performed by: HOSPITALIST

## 2023-11-18 PROCEDURE — 85025 COMPLETE CBC W/AUTO DIFF WBC: CPT

## 2023-11-18 RX ADMIN — OMEPRAZOLE 20 MG: 20 CAPSULE, DELAYED RELEASE ORAL at 06:09

## 2023-11-18 RX ADMIN — APIXABAN 10 MG: 5 TABLET, FILM COATED ORAL at 13:42

## 2023-11-18 RX ADMIN — MIDODRINE HYDROCHLORIDE 5 MG: 5 TABLET ORAL at 12:28

## 2023-11-18 RX ADMIN — HYDROXYZINE HYDROCHLORIDE 25 MG: 25 TABLET, FILM COATED ORAL at 12:35

## 2023-11-18 RX ADMIN — OXYCODONE HYDROCHLORIDE 10 MG: 10 TABLET ORAL at 18:13

## 2023-11-18 RX ADMIN — BUPROPION HYDROCHLORIDE 150 MG: 150 TABLET, EXTENDED RELEASE ORAL at 06:09

## 2023-11-18 RX ADMIN — OXYCODONE HYDROCHLORIDE 10 MG: 10 TABLET, FILM COATED, EXTENDED RELEASE ORAL at 06:08

## 2023-11-18 RX ADMIN — MIDODRINE HYDROCHLORIDE 5 MG: 5 TABLET ORAL at 09:26

## 2023-11-18 RX ADMIN — DOCUSATE SODIUM 50 MG AND SENNOSIDES 8.6 MG 2 TABLET: 8.6; 5 TABLET, FILM COATED ORAL at 18:13

## 2023-11-18 RX ADMIN — OXYCODONE HYDROCHLORIDE 10 MG: 10 TABLET ORAL at 06:08

## 2023-11-18 RX ADMIN — OXYCODONE 5 MG: 5 TABLET ORAL at 12:36

## 2023-11-18 RX ADMIN — OXYCODONE HYDROCHLORIDE 10 MG: 10 TABLET, FILM COATED, EXTENDED RELEASE ORAL at 18:13

## 2023-11-18 ASSESSMENT — PAIN DESCRIPTION - PAIN TYPE
TYPE: ACUTE PAIN

## 2023-11-18 ASSESSMENT — ENCOUNTER SYMPTOMS: ROS GI COMMENTS: VERY POOR APPETITE

## 2023-11-18 NOTE — CARE PLAN
The patient is Stable - Low risk of patient condition declining or worsening    Shift Goals  Clinical Goals: Patient will mobilize. Patient will consume 25-50% of meals  Patient Goals: To shower  Family Goals: Hospice consult    Progress made toward(s) clinical / shift goals:    Problem: Knowledge Deficit - Standard  Goal: Patient and family/care givers will demonstrate understanding of plan of care, disease process/condition, diagnostic tests and medications  Outcome: Progressing  Note: Discussed POC with patient and family at bedside. All questions answered as appropriate.      Problem: Respiratory  Goal: Patient will achieve/maintain optimum respiratory ventilation and gas exchange  Outcome: Progressing  Flowsheets  Taken 11/17/2023 1650 by Shobha Demarco, C.N.A.  O2 Delivery Device: Silicone Nasal Cannula  Taken 11/17/2023 0900 by Thania Ellis R.N.  Deep Breathe and Cough: Performs Correctly  Note: Supplemental O2 in place. Patient sustaining 89% on room air with ambulation.      Problem: Skin Integrity  Goal: Skin integrity is maintained or improved  Outcome: Progressing  Note: Patient out of bed multiple times this shift. No new skin break down.      Problem: Pain - Standard  Goal: Alleviation of pain or a reduction in pain to the patient’s comfort goal  Outcome: Progressing  Note: Minimal request for PRN pain medication. Reinforced PRS.      Problem: Fall Risk  Goal: Patient will remain free from falls  Outcome: Progressing  Note: Patient requires SBA and FWW to mobilize. Calls appropriately to make needs known. No injury or falls this shift. Fall precautions in place.      Problem: Hemodynamics  Goal: Patient's hemodynamics, fluid balance and neurologic status will be stable or improve  Outcome: Progressing  Note: Xa remains therapeutic this shift. No S.S of bleeding. VSS.        Patient is not progressing towards the following goals: Poor PO intake.

## 2023-11-18 NOTE — THERAPY
Patient is scheduled this week on March 1, 2022 at 3:00 to establish with Laura Treviño     Previous PCP: [facility]: [facility]   Notes:     Thank You Physical Therapy   Daily Treatment     Patient Name: Adam Yan  Age:  68 y.o., Sex:  male  Medical Record #: [id number]  Today's Date: 11/17/2023     Precautions  Precautions: Fall Risk    Assessment  Pt seen for PT tx session with mobility detailed down below. Pt continues to be limited by fatigue with OOB mobility and needs 1L NC to maintain SpO2 >90%. Pt is motivated to continue progressing with therapy, however he wanted to conserve energy for his shower as he had already been OOB multiple times today. Pt provided with sheet and educated on UE/LE therex to complete on his own time. Recommend HHPT, will follow.     Plan    Treatment Plan Status: Continue Current Treatment Plan  Type of Treatment: Bed Mobility, Gait Training, Neuro Re-Education / Balance, Therapeutic Activities, Therapeutic Exercise  Treatment Frequency: 3 Times per Week  Treatment Duration: Until Therapy Goals Met    DC Equipment Recommendations: Front-Wheel Walker  Discharge Recommendations: Recommend home health for continued physical therapy services        Vitals   Pulse Oximetry 93 %   O2 (LPM) 1   O2 Delivery Device Silicone Nasal Cannula   Pain 0 - 10 Group   Therapist Pain Assessment   (no c/o pain)   Cognition    Cognition / Consciousness WDL   Sitting Lower Body Exercises   Comments pt provided with seated HEP for UE/LE exercises and educated on proper completion based on activity tolerance   Balance   Sitting Balance (Static) Fair +   Sitting Balance (Dynamic) Fair   Standing Balance (Static) Fair -   Standing Balance (Dynamic) Fair -   Weight Shift Sitting Good   Weight Shift Standing Fair   Skilled Intervention Verbal Cuing   Comments FWW   Bed Mobility    Supine to Sit Supervised   Sit to Supine Supervised   Scooting Supervised   Skilled Intervention Verbal Cuing   Gait Analysis   Gait Level Of Assist Contact Guard Assist   Assistive Device Front Wheel Walker   Distance (Feet) 15   # of Times Distance was Traveled 2   Deviation  Bradykinetic   Weight Bearing Status no restrictions   Skilled Intervention Verbal Cuing   Comments distance limited by fatigue   Functional Mobility   Sit to Stand Standby Assist   Bed, Chair, Wheelchair Transfer Standby Assist   Toilet Transfers Contact Guard Assist   Mobility FWW   Skilled Intervention Verbal Cuing   How much difficulty does the patient currently have...   Turning over in bed (including adjusting bedclothes, sheets and blankets)? 3   Sitting down on and standing up from a chair with arms (e.g., wheelchair, bedside commode, etc.) 2   Moving from lying on back to sitting on the side of the bed? 3   How much help from another person does the patient currently need...   Moving to and from a bed to a chair (including a wheelchair)? 3   Need to walk in a hospital room? 3   Climbing 3-5 steps with a railing? 2   6 clicks Mobility Score 16   Activity Tolerance   Sitting in Chair 10 min   Sitting Edge of Bed 5 min   Standing 4 min   Comments limited by fatigue   Short Term Goals    Short Term Goal # 1 Pt will perform bed mobility with supervision in 6 vistis with flat bed, no rail.   Goal Outcome # 1 Goal met   Short Term Goal # 2 Pt will transfer with supervision in 6 visits to improve functional indep.   Goal Outcome # 2 Progressing as expected   Short Term Goal # 3 Pt will ambulate x 125 feet using FWW vs no AD with supervision in 6 visits.   Goal Outcome # 3 Progressing slower than expected   Physical Therapy Treatment Plan   Physical Therapy Treatment Plan Continue Current Treatment Plan   Anticipated Discharge Equipment and Recommendations   DC Equipment Recommendations Front-Wheel Walker   Discharge Recommendations Recommend home health for continued physical therapy services   Interdisciplinary Plan of Care Collaboration   IDT Collaboration with  Family / Caregiver;Nursing   Patient Position at End of Therapy In Bed;Call Light within Reach;Tray Table within Reach;Phone within Reach;Family / Friend  in Room   Collaboration Comments RN updated   Session Information   Date / Session Number  11/17- 2 (2/3, 11/20)

## 2023-11-18 NOTE — PROGRESS NOTES
Patient is A&Ox4. No complaints of pain. Plan of care discussed with the patient, all concerns addressed. Call light is within reach and patient educated on fall precautions, verbalized and demonstrated understanding. Bed in lowest and locked position. Hourly rounding in place.

## 2023-11-18 NOTE — PROGRESS NOTES
[facility] Medicine Daily Progress Note    Date of Service  11/18/2023    Chief Complaint  Adam Yan is a 68 y.o. male admitted 11/11/2023 with shortness of breath.    Hospital Course  Mr. Yan is a 68 y.o. male who presented 11/11/2023 with past medical history of pancreatic adenocarcinoma stage IV followed by Dr. Hatch oncology, hypertension who presents to the hospital for generalized weakness, fatigue and jaundice for 1 week prior to admit.  He also complains of shortness of breath on exertion.  Patient had was diagnosed with pancreatic carcinoma on 10/31 after an ERCP.  During admit he a CTA chest showing bilateral pulmonary emboli, CBD dilation, pancreatic head mass, and a doppler lower extremity with DVT.  An Echo showed acute right heart strain. He required admit to the ICU for for shock (hypovolemic and septic/distributive) and was on pressors.  He was jaundiced and labs suggest mechanical obstruction. On 11/13 he had EKOS with improvement.  GI was consulted for consideration of biliary stent.  Oncology consulted and due to hyperbilirubinemia he is not a current chemo candidate.       Interval Problem Update  11/16:  He is off norepinephrine.  Transferred to oncology.  Arcenio reviewed his notes.  I reviewed imaging with he and his wife showing them his lungs with miliary tumors, and pancreatic head mass.  Discussion over ERCP with pros/cons discussed.  He and his wife were made aware that whatever decision is made that he should have hospice arranged.  He has been too weak to walk and may need SNF in [place] prior to return home.   11/17: Mr. Yan was seen and evaluated on the oncology floor. His bilirubin has crept up to 7.9 and AST, ALT, alk phos have all gone up as well.  I had a long talk with him about the possible ERCP with stenting versus no further intervention and simply going home on hospice.  He is very emotional and struggling with his new terminal diagnosis.  At this point  in time he remains on IV heparin drip in case he wants it and will make a decision in the morning.  I did discuss with  gastroenterology.  11/18: Mr. Yan was seen on the oncology floor.  His wife Marilyn is at bedside.  We had a very long candid discussion about his advanced cancer.  He is elected not to undergo biliary stenting and wants to go home on hospice.  Unfortunately he is quite weak requiring assistance with a walker to go to the bathroom.  Marilyn states they do have a son that can come over and help and will also employ the use of their son.  They may try to hire people private to come to the house and help out.  They discussed possibly going to a skilled nursing facility in his region though I am concerned that if he goes to the facility he may not ever get home and now is probably the best time prior to him getting weaker.  35 minutes were spent that of which over 50% time was spent in counseling at bedside about hospice and end-of-life.    I have discussed this patient's plan of care and discharge plan at IDT rounds today with Case Management, Nursing, Nursing leadership, and other members of the IDT team.    Consultants/Specialty  Critical care  Oncology  GI    Code Status  DNAR/DNI    Disposition  The patient is not medically cleared for discharge to home or a post-acute facility.  Anticipate discharge to: hospice    I have placed the appropriate orders for post-discharge needs.    Review of Systems  Review of Systems   Constitutional:         Generally weak   Gastrointestinal:         Very poor appetite   All other systems reviewed and are negative.       Physical Exam  Temp:  [36.5 °C (97.7 °F)-37 °C (98.6 °F)] 37 °C (98.6 °F)  Pulse:  [69-72] 69  Resp:  [17-18] 18  BP: ()/(65-79) 115/68  SpO2:  [93 %-94 %] 93 %    Physical Exam  Vitals and nursing note reviewed.   Constitutional:       General: He is not in acute distress.     Appearance: He is ill-appearing. He is not  toxic-appearing.   HENT:      Mouth/Throat:      Mouth: Mucous membranes are dry.   Eyes:      General: Scleral icterus present.   Cardiovascular:      Rate and Rhythm: Normal rate and regular rhythm.   Abdominal:      General: There is distension.      Tenderness: There is no abdominal tenderness.   Skin:     Coloration: Skin is jaundiced.   Neurological:      Mental Status: He is alert and oriented to person, place, and time.   Psychiatric:         Mood and Affect: Mood normal.         Behavior: Behavior normal.         Fluids    Intake/Output Summary (Last 24 hours) at 11/18/2023 0753  Last data filed at 11/18/2023 0319  Gross per 24 hour   Intake 240 ml   Output 500 ml   Net -260 ml         Laboratory  Recent Labs     11/16/23 0447 11/17/23  0503 11/18/23  0538   WBC 8.0 8.8 9.2   RBC 3.55* 3.58* 3.44*   HEMOGLOBIN 11.3* 11.7* 11.5*   HEMATOCRIT 34.0* 33.4* 33.1*   MCV 95.8 93.3 96.2   MCH 31.8 32.7 33.4*   MCHC 33.2 35.0 34.7   RDW 56.7* 54.2* 58.6*   PLATELETCT 130* 143* 154*   MPV 10.1 10.1 9.9       Recent Labs     11/16/23 0447 11/17/23  0503 11/18/23  0538   SODIUM 131* 129* 130*   POTASSIUM 4.1 3.9 4.1   CHLORIDE 102 99 98   CO2 19* 20 21   GLUCOSE 102* 110* 104*   BUN 35* 29* 26*   CREATININE 1.01 0.96 0.89   CALCIUM 7.7* 8.1* 8.1*                     Imaging  IR-PULMONARY ARTERY-R-L SELECTIVE RIGHT   Final Result      1.  Pulmonary hypertension as recorded above.   2.  BILATERAL selective pulmonary arteriograms most notable for large distal right main pulmonary artery clot filling defect concordant with CTA findings.   3.  Placement of BILATERAL EKOS catheters   4.  Initiation of bilateral thrombolytic infusion with EKOS acoustic pulse thrombolysis therapy (US-assisted thrombolysis) for duration 4 hours      US-EXTREMITY VENOUS LOWER BILAT   Final Result      CT-CTA CHEST PULMONARY ARTERY W/ RECONS   Final Result      1.  Extensive bilateral pulmonary emboli involving the main pulmonary arteries,  segmental, and subsegmental branches. RV strain noted.      2.  Diffuse metastatic disease throughout the lung fields.      3.  Confluent masslike infiltrates in the periphery of the lung fields bilaterally consistent with metastatic disease and/or pneumonitis.      4.  Mild to moderate bilateral pleural effusions.            EC-ECHOCARDIOGRAM COMPLETE W/ CONT   Final Result      CT-ABDOMEN-PELVIS WITH   Final Result         1. 3 cm ill-defined pancreatic mass, which could represent pancreatic adenocarcinoma or cholangiocarcinoma. There is encasement of the adjacent portal, splenic, and mesenteric veins. There is common bile duct dilation above the level of the lesion.   2. There are several peritoneal nodules, largest measuring 4.1 cm. There is also ascites. Therefore, consistent with peritoneal carcinomatosis.   3. Innumerable lower lung zone nodules, which could represent metastatic disease or a benign process.   4. Liver appears cirrhotic.   5. Density within the gallbladder, as above.   6. Simple small right renal cyst, which does not require follow-up.      DX-CHEST-PORTABLE (1 VIEW)   Final Result      Hypoinflation with prominent interstitial and numerous small ill-defined pulmonary nodules bilaterally may indicate granulomatous disease, pneumonitis or neoplastic process.           Assessment/Plan  * Acute pulmonary embolism with acute cor pulmonale (HCC)  Assessment & Plan  S/P EKOS 11/13  Heparin drip will be stopped and start Eliquis    Pancreatic adenocarcinoma (HCC)- (present on admission)  Assessment & Plan  Diagnosed on 10/31  Metastatic to the lungs  Pain control  The patient has not started chemotherapy  Palliative consult  Follows Dr. Hatch as oncology in [place]  Dr Frey's note reviewed and no current chemotherapy options given his hyperbilirubinemia which will not improve unless he were to have a stent which he has declined.  Home on hospice  Pain control    Thrombocytopenia  (HCC)  Assessment & Plan  Due to metastatic adenocarcinoma  11/11 Plt:130 <134<74 <126    Hyponatremia- (present on admission)  Assessment & Plan  Hypovolemia hyponatremia  IV fluids were given  11/16 Na:131    Right ventricular failure (HCC)  Assessment & Plan  Due to pulmonary embolism.  Echo reviewed.  S/P Ekos 11/13    Goals of care, counseling/discussion  Assessment & Plan  DNR/DNI  Hospice on discharge    Shock (HCC)- (present on admission)  Assessment & Plan  Norepinephrine stopped and off 11/16.  He has been on midodrine which will be stopped on 11/18/2023 and monitor blood pressure    Acute kidney injury (HCC)- (present on admission)  Assessment & Plan  Likely prerenal  IV fluid hydration   Creatinine normalized      Biliary obstruction- (present on admission)  Assessment & Plan  Secondary to an pancreatic mass  GI was consulted and offered a palliative biliary stent.  He has elected to not undergo this procedure and go home on hospice  Pain control  No evidence of fevers or signs of cholangitis    Primary hypertension- (present on admission)  Assessment & Plan  His blood pressure has been low    DVT (deep venous thrombosis) (HCC)- (present on admission)  Assessment & Plan  Heparin drip         VTE prophylaxis:    therapeutic anticoagulation with eliquis 5 mg BID      I have performed a physical exam and reviewed and updated ROS and Plan today (11/18/2023). In review of yesterday's note (11/17/2023), there are no changes except as documented above.

## 2023-11-18 NOTE — CARE PLAN
The patient is Watcher - Medium risk of patient condition declining or worsening    Shift Goals  Clinical Goals: pain and anxiety management  Patient Goals: rest overnight  Family Goals: patient comfort    Progress made toward(s) clinical / shift goals:    Problem: Knowledge Deficit - Standard  Goal: Patient and family/care givers will demonstrate understanding of plan of care, disease process/condition, diagnostic tests and medications  Outcome: Progressing     Problem: Respiratory  Goal: Patient will achieve/maintain optimum respiratory ventilation and gas exchange  Outcome: Progressing     Problem: Pain - Standard  Goal: Alleviation of pain or a reduction in pain to the patient’s comfort goal  Outcome: Progressing   Patient being monitored and medicated per the MAR.  Problem: Fall Risk  Goal: Patient will remain free from falls  Outcome: Progressing   Fall precautions in place, patient calls appropriately and makes needs known.     Patient is not progressing towards the following goals:

## 2023-11-18 NOTE — PROGRESS NOTES
Updated by primary that patient does not wish to proceed with ERCP with stent placement.     No further inventions from acute GI team.  GI to sign off.  Please reconsult for any further questions or concerns.

## 2023-11-19 PROBLEM — J96.01 ACUTE RESPIRATORY FAILURE WITH HYPOXIA (HCC): Status: ACTIVE | Noted: 2023-11-19

## 2023-11-19 PROCEDURE — A9270 NON-COVERED ITEM OR SERVICE: HCPCS | Performed by: HOSPITALIST

## 2023-11-19 PROCEDURE — 700102 HCHG RX REV CODE 250 W/ 637 OVERRIDE(OP): Performed by: HOSPITALIST

## 2023-11-19 PROCEDURE — 770004 HCHG ROOM/CARE - ONCOLOGY PRIVATE *

## 2023-11-19 PROCEDURE — A9270 NON-COVERED ITEM OR SERVICE: HCPCS

## 2023-11-19 PROCEDURE — A9270 NON-COVERED ITEM OR SERVICE: HCPCS | Performed by: EMERGENCY MEDICINE

## 2023-11-19 PROCEDURE — 700102 HCHG RX REV CODE 250 W/ 637 OVERRIDE(OP): Performed by: EMERGENCY MEDICINE

## 2023-11-19 PROCEDURE — 302118 SHAMPOO,NO RINSE: Performed by: HOSPITALIST

## 2023-11-19 PROCEDURE — 700102 HCHG RX REV CODE 250 W/ 637 OVERRIDE(OP)

## 2023-11-19 PROCEDURE — 99232 SBSQ HOSP IP/OBS MODERATE 35: CPT | Performed by: HOSPITALIST

## 2023-11-19 RX ORDER — OXYCODONE HYDROCHLORIDE 10 MG/1
10 TABLET ORAL ONCE
Status: COMPLETED | OUTPATIENT
Start: 2023-11-20 | End: 2023-11-19

## 2023-11-19 RX ADMIN — OXYCODONE HYDROCHLORIDE 10 MG: 10 TABLET, FILM COATED, EXTENDED RELEASE ORAL at 18:06

## 2023-11-19 RX ADMIN — OXYCODONE HYDROCHLORIDE 10 MG: 10 TABLET ORAL at 01:29

## 2023-11-19 RX ADMIN — OXYCODONE HYDROCHLORIDE 10 MG: 10 TABLET, FILM COATED, EXTENDED RELEASE ORAL at 06:11

## 2023-11-19 RX ADMIN — OXYCODONE 5 MG: 5 TABLET ORAL at 21:51

## 2023-11-19 RX ADMIN — OXYCODONE 5 MG: 5 TABLET ORAL at 15:03

## 2023-11-19 RX ADMIN — OXYCODONE 5 MG: 5 TABLET ORAL at 09:33

## 2023-11-19 RX ADMIN — APIXABAN 10 MG: 5 TABLET, FILM COATED ORAL at 18:06

## 2023-11-19 RX ADMIN — OXYCODONE 5 MG: 5 TABLET ORAL at 18:12

## 2023-11-19 RX ADMIN — HYDROXYZINE HYDROCHLORIDE 25 MG: 25 TABLET, FILM COATED ORAL at 16:19

## 2023-11-19 RX ADMIN — BUPROPION HYDROCHLORIDE 150 MG: 150 TABLET, EXTENDED RELEASE ORAL at 06:10

## 2023-11-19 RX ADMIN — OXYCODONE HYDROCHLORIDE 10 MG: 10 TABLET ORAL at 23:39

## 2023-11-19 RX ADMIN — HYDROXYZINE HYDROCHLORIDE 25 MG: 25 TABLET, FILM COATED ORAL at 21:52

## 2023-11-19 RX ADMIN — OMEPRAZOLE 20 MG: 20 CAPSULE, DELAYED RELEASE ORAL at 06:10

## 2023-11-19 RX ADMIN — APIXABAN 10 MG: 5 TABLET, FILM COATED ORAL at 06:10

## 2023-11-19 RX ADMIN — OXYCODONE HYDROCHLORIDE 10 MG: 10 TABLET ORAL at 06:15

## 2023-11-19 RX ADMIN — DOCUSATE SODIUM 50 MG AND SENNOSIDES 8.6 MG 2 TABLET: 8.6; 5 TABLET, FILM COATED ORAL at 06:11

## 2023-11-19 ASSESSMENT — ENCOUNTER SYMPTOMS: ROS GI COMMENTS: VERY POOR APPETITE

## 2023-11-19 ASSESSMENT — PAIN DESCRIPTION - PAIN TYPE
TYPE: ACUTE PAIN

## 2023-11-19 NOTE — CARE PLAN
Problem: Knowledge Deficit - Standard  Goal: Patient and family/care givers will demonstrate understanding of plan of care, disease process/condition, diagnostic tests and medications  Outcome: Progressing     Problem: Respiratory  Goal: Patient will achieve/maintain optimum respiratory ventilation and gas exchange  Outcome: Progressing     Problem: Pain - Standard  Goal: Alleviation of pain or a reduction in pain to the patient’s comfort goal  Outcome: Progressing   The patient is Stable - Low risk of patient condition declining or worsening    Shift Goals  Clinical Goals: pain and anxiety control  Patient Goals: sleep  Family Goals: comfort

## 2023-11-19 NOTE — ASSESSMENT & PLAN NOTE
Secondary to acute pulmonary embolism for which he is on Eliquis  Likely a component of atelectasis thus incentive spirometry 10x/hour and attempts to taper oxygen.

## 2023-11-19 NOTE — CARE PLAN
The patient is Watcher - Medium risk of patient condition declining or worsening    Shift Goals  Clinical Goals: pain and anxiety management  Patient Goals: rest overnight  Family Goals: patient comfort    Progress made toward(s) clinical / shift goals:    Problem: Knowledge Deficit - Standard  Goal: Patient and family/care givers will demonstrate understanding of plan of care, disease process/condition, diagnostic tests and medications  Outcome: Progressing     Problem: Pain - Standard  Goal: Alleviation of pain or a reduction in pain to the patient’s comfort goal  11/18/2023 1905 by Steph Lowe R.N.  Outcome: Progressing  11/18/2023 1905 by Steph Lowe R.N.  Outcome: Progressing     Problem: Skin Integrity  Goal: Skin integrity is maintained or improved  Outcome: Progressing     Problem: Mobility  Goal: Patient's capacity to carry out activities will improve  Outcome: Progressing       Patient is not progressing towards the following goals:

## 2023-11-20 PROBLEM — F41.9 ANXIETY: Status: ACTIVE | Noted: 2023-11-20

## 2023-11-20 PROCEDURE — A9270 NON-COVERED ITEM OR SERVICE: HCPCS | Performed by: HOSPITALIST

## 2023-11-20 PROCEDURE — 700102 HCHG RX REV CODE 250 W/ 637 OVERRIDE(OP): Performed by: HOSPITALIST

## 2023-11-20 PROCEDURE — A9270 NON-COVERED ITEM OR SERVICE: HCPCS | Performed by: EMERGENCY MEDICINE

## 2023-11-20 PROCEDURE — 99233 SBSQ HOSP IP/OBS HIGH 50: CPT | Performed by: HOSPITALIST

## 2023-11-20 PROCEDURE — 770004 HCHG ROOM/CARE - ONCOLOGY PRIVATE *

## 2023-11-20 PROCEDURE — 99231 SBSQ HOSP IP/OBS SF/LOW 25: CPT | Performed by: NURSE PRACTITIONER

## 2023-11-20 PROCEDURE — 700102 HCHG RX REV CODE 250 W/ 637 OVERRIDE(OP)

## 2023-11-20 PROCEDURE — A9270 NON-COVERED ITEM OR SERVICE: HCPCS

## 2023-11-20 PROCEDURE — 700102 HCHG RX REV CODE 250 W/ 637 OVERRIDE(OP): Performed by: EMERGENCY MEDICINE

## 2023-11-20 PROCEDURE — RXMED WILLOW AMBULATORY MEDICATION CHARGE: Performed by: HOSPITALIST

## 2023-11-20 RX ORDER — ALPRAZOLAM 0.25 MG/1
0.25 TABLET ORAL 4 TIMES DAILY PRN
Status: DISCONTINUED | OUTPATIENT
Start: 2023-11-20 | End: 2023-11-21 | Stop reason: HOSPADM

## 2023-11-20 RX ORDER — ONDANSETRON 4 MG/1
4 TABLET, ORALLY DISINTEGRATING ORAL EVERY 4 HOURS PRN
Qty: 20 TABLET | Refills: 2 | Status: SHIPPED | OUTPATIENT
Start: 2023-11-20

## 2023-11-20 RX ORDER — ALPRAZOLAM 0.25 MG/1
0.25 TABLET ORAL 4 TIMES DAILY PRN
Qty: 25 TABLET | Refills: 1 | Status: SHIPPED | OUTPATIENT
Start: 2023-11-20 | End: 2023-11-28

## 2023-11-20 RX ORDER — OXYCODONE HYDROCHLORIDE 10 MG/1
10 TABLET ORAL EVERY 4 HOURS PRN
Qty: 25 TABLET | Refills: 0 | Status: SHIPPED | OUTPATIENT
Start: 2023-11-20 | End: 2023-11-26

## 2023-11-20 RX ORDER — OXYCODONE HCL 20 MG/1
20 TABLET, FILM COATED, EXTENDED RELEASE ORAL EVERY 12 HOURS
Status: DISCONTINUED | OUTPATIENT
Start: 2023-11-20 | End: 2023-11-21 | Stop reason: HOSPADM

## 2023-11-20 RX ORDER — OXYCODONE HCL 20 MG/1
20 TABLET, FILM COATED, EXTENDED RELEASE ORAL EVERY 12 HOURS
Qty: 10 TABLET | Refills: 0 | Status: SHIPPED | OUTPATIENT
Start: 2023-11-20 | End: 2023-11-20 | Stop reason: SDUPTHER

## 2023-11-20 RX ORDER — OXYCODONE HCL 10 MG/1
10 TABLET, FILM COATED, EXTENDED RELEASE ORAL ONCE
Status: DISPENSED | OUTPATIENT
Start: 2023-11-20 | End: 2023-11-21

## 2023-11-20 RX ORDER — AMOXICILLIN 250 MG
2 CAPSULE ORAL 2 TIMES DAILY
Qty: 120 TABLET | Refills: 1 | Status: SHIPPED | OUTPATIENT
Start: 2023-11-20 | End: 2024-01-19

## 2023-11-20 RX ORDER — OXYCODONE HCL 20 MG/1
20 TABLET, FILM COATED, EXTENDED RELEASE ORAL EVERY 12 HOURS
Qty: 10 TABLET | Refills: 0 | Status: SHIPPED | OUTPATIENT
Start: 2023-11-20 | End: 2023-11-21 | Stop reason: SDUPTHER

## 2023-11-20 RX ADMIN — OXYCODONE HYDROCHLORIDE 10 MG: 10 TABLET ORAL at 09:27

## 2023-11-20 RX ADMIN — OXYCODONE HYDROCHLORIDE 10 MG: 10 TABLET ORAL at 13:13

## 2023-11-20 RX ADMIN — BUPROPION HYDROCHLORIDE 150 MG: 150 TABLET, EXTENDED RELEASE ORAL at 05:16

## 2023-11-20 RX ADMIN — OMEPRAZOLE 20 MG: 20 CAPSULE, DELAYED RELEASE ORAL at 05:16

## 2023-11-20 RX ADMIN — OXYCODONE HYDROCHLORIDE 10 MG: 10 TABLET ORAL at 18:31

## 2023-11-20 RX ADMIN — OXYCODONE HYDROCHLORIDE 20 MG: 20 TABLET, FILM COATED, EXTENDED RELEASE ORAL at 17:50

## 2023-11-20 RX ADMIN — OXYCODONE HYDROCHLORIDE 10 MG: 10 TABLET, FILM COATED, EXTENDED RELEASE ORAL at 05:14

## 2023-11-20 RX ADMIN — APIXABAN 10 MG: 5 TABLET, FILM COATED ORAL at 17:50

## 2023-11-20 RX ADMIN — APIXABAN 10 MG: 5 TABLET, FILM COATED ORAL at 05:15

## 2023-11-20 RX ADMIN — ALPRAZOLAM 0.25 MG: 0.25 TABLET ORAL at 09:35

## 2023-11-20 RX ADMIN — DOCUSATE SODIUM 50 MG AND SENNOSIDES 8.6 MG 2 TABLET: 8.6; 5 TABLET, FILM COATED ORAL at 17:50

## 2023-11-20 RX ADMIN — OXYCODONE HYDROCHLORIDE 10 MG: 10 TABLET ORAL at 05:14

## 2023-11-20 RX ADMIN — ALPRAZOLAM 0.25 MG: 0.25 TABLET ORAL at 20:13

## 2023-11-20 ASSESSMENT — ENCOUNTER SYMPTOMS
EYES NEGATIVE: 1
RESPIRATORY NEGATIVE: 1
CARDIOVASCULAR NEGATIVE: 1
NERVOUS/ANXIOUS: 1
ROS GI COMMENTS: VERY POOR APPETITE
NAUSEA: 0
MUSCULOSKELETAL NEGATIVE: 1
ABDOMINAL PAIN: 1
NEUROLOGICAL NEGATIVE: 1

## 2023-11-20 ASSESSMENT — PAIN DESCRIPTION - PAIN TYPE
TYPE: ACUTE PAIN
TYPE: ACUTE PAIN

## 2023-11-20 NOTE — CARE PLAN
The patient is Watcher - Medium risk of patient condition declining or worsening    Shift Goals  Clinical Goals: pain control, rest  Patient Goals: shower, pain control, walk  Family Goals: shower, pain control, walk    Progress made toward(s) clinical / shift goals:    Problem: Respiratory  Goal: Patient will achieve/maintain optimum respiratory ventilation and gas exchange  Outcome: Progressing    IS provided at bedside, pt demonstrates correct use and is able to pull 1000.  Encouraged pt to use 10x/hr while awake. Pt verbalized understanding.      Problem: Pain - Standard  Goal: Alleviation of pain or a reduction in pain to the patient’s comfort goal  Outcome: Progressing    Medicated with scheduled and prn medication with good results as pt declining further interventions upon reassessments.      Problem: Mobility  Goal: Patient's capacity to carry out activities will improve  Outcome: Progressing    Pt up with one assist and FWW to bathroom several times today.        Patient is not progressing towards the following goals:

## 2023-11-20 NOTE — CARE PLAN
The patient is Watcher - Medium risk of patient condition declining or worsening    Shift Goals  Clinical Goals: pain control, comfort  Patient Goals: comfort  Family Goals: pain/anxietycontrol    Progress made toward(s) clinical / shift goals:      A/Ox4, pt is able to understand plan of care. All questions answered at the moment. PRN pain medications given per MAR working effectively to promote comfort.  Pt states feeling better after the ativan. Fall precautions in place, bed in lowest position, call light and belongings in reach.   Pt calls appropriately.     Problem: Knowledge Deficit - Standard  Goal: Patient and family/care givers will demonstrate understanding of plan of care, disease process/condition, diagnostic tests and medications  Outcome: Progressing     Problem: Respiratory  Goal: Patient will achieve/maintain optimum respiratory ventilation and gas exchange  Outcome: Progressing     Problem: Physical Regulation  Goal: Diagnostic test results will improve  Outcome: Progressing     Problem: Skin Integrity  Goal: Skin integrity is maintained or improved  Outcome: Progressing     Problem: Mobility  Goal: Patient's capacity to carry out activities will improve  Outcome: Progressing       Patient is not progressing towards the following goals:

## 2023-11-20 NOTE — DISCHARGE PLANNING
DC Transport Scheduled    Received request at: 11/20/2023 at 1429    Transport Company Scheduled:  [employer]  Spoke with Chan at [facility] to schedule transport.    Scheduled Date: 11/21/2023  Scheduled Time: 1100    Destination: [place] at [address] [place]     Notified care team of scheduled transport via [employer].     If there are any changes needed to the DC transportation scheduled, please contact [facility] at ext. [phone] between the hours of 3924-0815 Mon-Fri. If outside those hours, contact the ED Case Manager at ext. [phone].

## 2023-11-20 NOTE — DIETARY
Nutrition Note:  Patient noted to have PO intakes <50% upon RD screening for adequacy of PO intake.  Goals of care and discharge planning in place.  Patient is to discharge home today or tomorrow with hospice.    Dietary staff available for ongoing meal snack and supplements preferences.    RD available if goals of care change and following per dept policy.

## 2023-11-20 NOTE — PROGRESS NOTES
Inpatient Palliative Care     Location: R 310     HPI:   Adam is seen lying in bed, he appears weak but is verbal.  He confirms he has decided to go home on hospice without further interventions.  Her and son are at bedside.     Summary:  Patient confirms hospice choice and discharge tomorrow for care to be assumed by [facility] upon arrival home.  Patient agreeable to continue current pain regimen as pain is well controlled at this time.  He reports minimal appetite but no nausea.  Remains on oxygen.  He is comfortable with current goals and is looking forward to going home with his family.     Active listening, reflection, reminiscing, validation & normalization, and empathic support utilized throughout this encounter.  All questions answered and contact information provided, encouraged to call with any questions or concerns.      Plan:     1) palliative care to sign off  2)   3)        Thank you for allowing me the opportunity to participate in the care of Adam.     I spent a total of 26 minutes reviewing medical records, direct face-to-face time with the patient and/or family, coordination of care, and documentation. This is separate from the time spent on advance care planning, which is documented above.     Elvia Schmitz, MSN, APRN, ACNPC-AG.  Palliative Care Nurse Practitioner  [phone]

## 2023-11-20 NOTE — DISCHARGE PLANNING
"Case Management Discharge Planning    Admission Date: 11/11/2023  GMLOS: 8.2  ALOS: 9    6-Clicks ADL Score: 20  6-Clicks Mobility Score: 16  PT and/or OT Eval ordered: Yes  Post-acute Referrals Ordered: Yes  Post-acute Choice Obtained: Yes  Has referral(s) been sent to post-acute provider:  Yes      Anticipated Discharge Dispo: Discharge Disposition: D/T to hospice home (50)  Discharge Address: [address] [place] [id number]    DME Needed: No    Action(s) Taken: LSW met with pt and pt's wife, Marilyn, at bedside. Pt confirmed that they would like to DC home with [facility]. Pt voiced that he would be more comfortable staying one more day in the hospital to regain some strength prior to DC, however may reconsider this afternoon pending coordination with [facility]. Choice form faxed to [facility]. LSW made phone call to \"Bear\" with [facility] who reported their team is already aware of pt and are making arrangements for his return home, hopefully today. Pt stated he will need medical transportation home due to weakness.    Addendum @0202  LSW made phone call to Wilder with [facility]. Left  with callback request.    Addendum @7227  LSW spoke with Wilder who confirmed DME will be delivered later today and asked that transportation be arranged for anytime tomorrow after 0900.    LSW met with pt and wife at bedside. Pt does not feel comfortable being transported via private vehicle and asked that [facility] be scheduled. Pt and family would like transportation at 1100 if possible. Transportation forms faxed to [employer]    Addendum @5902  Transportation confirmed for 1100 tomorrow.  left for Wilder with update.    Addendum @3581  LSW notified [facility] changed  time to 1200. Wilder notified.    Escalations Completed: None    Medically Clear: Yes    Next Steps: No CM needs identified at this time    Barriers to Discharge: DME and Transportation    Is the patient up for discharge tomorrow: Yes    Is transport " arranged for discharge disposition: Yes

## 2023-11-20 NOTE — PROGRESS NOTES
[facility] Medicine Daily Progress Note    Date of Service  11/20/2023    Chief Complaint  Adam Yan is a 68 y.o. male admitted 11/11/2023 with shortness of breath.    Hospital Course  Mr. Yan is a 68 y.o. male who presented 11/11/2023 with past medical history of pancreatic adenocarcinoma stage IV followed by Dr. Hatch oncology, hypertension who presents to the hospital for generalized weakness, fatigue and jaundice for 1 week prior to admit.  He also complains of shortness of breath on exertion.  Patient had was diagnosed with pancreatic carcinoma on 10/31 after an ERCP.  During admit he a CTA chest showing bilateral pulmonary emboli, CBD dilation, pancreatic head mass, and a doppler lower extremity with DVT.  An Echo showed acute right heart strain. He required admit to the ICU for for shock (hypovolemic and septic/distributive) and was on pressors.  He was jaundiced and labs suggest mechanical obstruction. On 11/13 he had EKOS with improvement.  GI was consulted for consideration of biliary stent.  Oncology consulted and due to hyperbilirubinemia he is not a current chemo candidate.       Interval Problem Update  11/16:  He is off norepinephrine.  Transferred to oncology.  Arcenio reviewed his notes.  I reviewed imaging with he and his wife showing them his lungs with miliary tumors, and pancreatic head mass.  Discussion over ERCP with pros/cons discussed.  He and his wife were made aware that whatever decision is made that he should have hospice arranged.  He has been too weak to walk and may need SNF in [place] prior to return home.   11/17: Mr. Yan was seen and evaluated on the oncology floor. His bilirubin has crept up to 7.9 and AST, ALT, alk phos have all gone up as well.  I had a long talk with him about the possible ERCP with stenting versus no further intervention and simply going home on hospice.  He is very emotional and struggling with his new terminal diagnosis.  At this point  in time he remains on IV heparin drip in case he wants it and will make a decision in the morning.  I did discuss with  gastroenterology.  11/18: Mr. Yan was seen on the oncology floor.  His wife Marilyn is at bedside.  We had a very long candid discussion about his advanced cancer.  He is elected not to undergo biliary stenting and wants to go home on hospice.  Unfortunately he is quite weak requiring assistance with a walker to go to the bathroom.  Marilyn states they do have a son that can come over and help and will also employ the use of their son.  They may try to hire people private to come to the house and help out.  They discussed possibly going to a skilled nursing facility in his region though I am concerned that if he goes to the facility he may not ever get home and now is probably the best time prior to him getting weaker.    11/19: Mr. Bailey was seen on the oncology floor. His blood pressure is maintaining off of midodrine. He is now on loading-dose Eliquis. His son is at bedside. He continues to require supplemental oxygen and incentive spirometry ordered 10x/hour.  11/20 Adam reportedly had significant amount of pain last night, overall his pain and anxiety is not adequately controlled, we offered additional pain medication adjustment including increasing his OxyContin and prescribing Xanax, this apparently later had good effect on him,  The patient is scheduled to return home with hospice, arrangements are currently underway, anticipated discharge date is 11/21  Updated family and patient at the bedside in detail on current status    I have discussed this patient's plan of care and discharge plan at IDT rounds today with Case Management, Nursing, Nursing leadership, and other members of the IDT team.    Consultants/Specialty  Critical care  Oncology  GI    Code Status  DNAR/DNI    Disposition    Anticipate discharge to: hospice    I have placed the appropriate orders for  post-discharge needs.    Review of Systems  Review of Systems   Constitutional:  Positive for malaise/fatigue.        Generally weak   HENT: Negative.     Eyes: Negative.    Respiratory: Negative.     Cardiovascular: Negative.    Gastrointestinal:  Positive for abdominal pain. Negative for nausea.        Very poor appetite   Genitourinary: Negative.    Musculoskeletal: Negative.    Skin: Negative.    Neurological: Negative.    Endo/Heme/Allergies: Negative.    Psychiatric/Behavioral:  The patient is nervous/anxious.    All other systems reviewed and are negative.       Physical Exam  Temp:  [36.3 °C (97.3 °F)-36.9 °C (98.4 °F)] 36.9 °C (98.4 °F)  Pulse:  [63-72] 63  Resp:  [16-18] 17  BP: (106-120)/(72-76) 106/73  SpO2:  [92 %-96 %] 96 %    Physical Exam  Vitals and nursing note reviewed.   Constitutional:       General: He is not in acute distress.     Appearance: He is well-developed. He is ill-appearing. He is not toxic-appearing.      Comments: Pt seen and examined.   HENT:      Head: Normocephalic and atraumatic.      Mouth/Throat:      Mouth: Mucous membranes are dry.   Eyes:      General: Scleral icterus present.      Pupils: Pupils are equal, round, and reactive to light.   Cardiovascular:      Rate and Rhythm: Normal rate and regular rhythm.      Heart sounds: Normal heart sounds.   Pulmonary:      Effort: Pulmonary effort is normal.      Breath sounds: Normal breath sounds.   Abdominal:      General: Bowel sounds are normal. There is distension.      Palpations: Abdomen is soft.      Tenderness: There is no abdominal tenderness.   Musculoskeletal:         General: Normal range of motion.      Cervical back: Normal range of motion and neck supple.   Skin:     General: Skin is warm and dry.      Coloration: Skin is jaundiced.   Neurological:      General: No focal deficit present.      Mental Status: He is alert and oriented to person, place, and time.   Psychiatric:         Behavior: Behavior normal.       Comments: Depressed appearing, nervous         Fluids    Intake/Output Summary (Last 24 hours) at 11/20/2023 0736  Last data filed at 11/19/2023 0804  Gross per 24 hour   Intake --   Output 50 ml   Net -50 ml         Laboratory  Recent Labs     11/18/23  0538   WBC 9.2   RBC 3.44*   HEMOGLOBIN 11.5*   HEMATOCRIT 33.1*   MCV 96.2   MCH 33.4*   MCHC 34.7   RDW 58.6*   PLATELETCT 154*   MPV 9.9       Recent Labs     11/18/23  0538   SODIUM 130*   POTASSIUM 4.1   CHLORIDE 98   CO2 21   GLUCOSE 104*   BUN 26*   CREATININE 0.89   CALCIUM 8.1*                     Imaging  IR-PULMONARY ARTERY-R-L SELECTIVE RIGHT   Final Result      1.  Pulmonary hypertension as recorded above.   2.  BILATERAL selective pulmonary arteriograms most notable for large distal right main pulmonary artery clot filling defect concordant with CTA findings.   3.  Placement of BILATERAL EKOS catheters   4.  Initiation of bilateral thrombolytic infusion with EKOS acoustic pulse thrombolysis therapy (US-assisted thrombolysis) for duration 4 hours      US-EXTREMITY VENOUS LOWER BILAT   Final Result      CT-CTA CHEST PULMONARY ARTERY W/ RECONS   Final Result      1.  Extensive bilateral pulmonary emboli involving the main pulmonary arteries, segmental, and subsegmental branches. RV strain noted.      2.  Diffuse metastatic disease throughout the lung fields.      3.  Confluent masslike infiltrates in the periphery of the lung fields bilaterally consistent with metastatic disease and/or pneumonitis.      4.  Mild to moderate bilateral pleural effusions.            EC-ECHOCARDIOGRAM COMPLETE W/ CONT   Final Result      CT-ABDOMEN-PELVIS WITH   Final Result         1. 3 cm ill-defined pancreatic mass, which could represent pancreatic adenocarcinoma or cholangiocarcinoma. There is encasement of the adjacent portal, splenic, and mesenteric veins. There is common bile duct dilation above the level of the lesion.   2. There are several peritoneal nodules,  largest measuring 4.1 cm. There is also ascites. Therefore, consistent with peritoneal carcinomatosis.   3. Innumerable lower lung zone nodules, which could represent metastatic disease or a benign process.   4. Liver appears cirrhotic.   5. Density within the gallbladder, as above.   6. Simple small right renal cyst, which does not require follow-up.      DX-CHEST-PORTABLE (1 VIEW)   Final Result      Hypoinflation with prominent interstitial and numerous small ill-defined pulmonary nodules bilaterally may indicate granulomatous disease, pneumonitis or neoplastic process.           Assessment/Plan  * Acute pulmonary embolism with acute cor pulmonale (HCC)- (present on admission)  Assessment & Plan  S/P EKOS 11/13  Heparin drip transitioned to oral Eliquis with loading dose    Anxiety  Assessment & Plan  Situational, Xanax provided    Acute respiratory failure with hypoxia (HCC)- (present on admission)  Assessment & Plan  Secondary to acute pulmonary embolism for which he is on Eliquis  Likely a component of atelectasis thus incentive spirometry 10x/hour and attempts to taper oxygen.    Thrombocytopenia (HCC)  Assessment & Plan  Due to metastatic adenocarcinoma  11/11 Plt:130 <134<74 <126    Hyponatremia- (present on admission)  Assessment & Plan  Hypovolemia hyponatremia  IV fluids were given  11/16 Na:131    Right ventricular failure (HCC)- (present on admission)  Assessment & Plan  Due to pulmonary embolism.  Echo reviewed.  S/P Ekos 11/13    Goals of care, counseling/discussion  Assessment & Plan  DNR/DNI  Hospice on discharge    Shock (HCC)- (present on admission)  Assessment & Plan  Norepinephrine stopped and off 11/16.  He has been on midodrine which will be stopped on 11/18/2023 and his blood pressure has maintained.    Acute kidney injury (HCC)- (present on admission)  Assessment & Plan  Likely prerenal  IV fluid hydration   Creatinine normalized      Pancreatic adenocarcinoma (HCC)- (present on  admission)  Assessment & Plan  Diagnosed on 10/31  Metastatic to the lungs  Pain control  The patient has not started chemotherapy  Palliative consult  Follows Dr. Hatch as oncology in [place]  Dr Frey's note reviewed and no current chemotherapy options given his hyperbilirubinemia which will not improve unless he were to have a stent which he has declined.  Home on hospice  Pain control with oxycontin 20 mg BID scheduled and he continues to require prn oxycodone and IV dilaudid   Referred to home hospice with further palliative care    Biliary obstruction- (present on admission)  Assessment & Plan  Secondary to an pancreatic mass  GI was consulted and offered a palliative biliary stent.  He has elected to not undergo this procedure and go home on hospice  Pain control  No evidence of fevers or signs of cholangitis    Primary hypertension- (present on admission)  Assessment & Plan  His blood pressure has been low    DVT (deep venous thrombosis) (HCC)- (present on admission)  Assessment & Plan  On anticoagulation form of apixaban    Plan  Further adjusting pain medication and anxiolytics to focus on the patient's comfort prior to returning home with hospice  Hospice arrangements underway, discharge date will be 11/21  We will provide medication for discharge  Symptomatic care otherwise  Ensure the patient has adequate bowel management on discharge to prevent constipation  Continued poor p.o. intake,  See orders  Patient is has a high medical complexity, complex decision making and is at high risk for complication, morbidity, and mortality.  My total time spent caring for the patient on the day of the encounter was 57 minutes.   This does not include time spent on separately billable procedures/tests.  Detailed discussion about current medication changes, plan    VTE prophylaxis:    therapeutic anticoagulation with eliquis 5 mg BID      I have performed a physical exam and reviewed and updated ROS and Plan today  (11/20/2023). In review of yesterday's note (11/19/2023), there are no changes except as documented above.    Please note that this dictation was created using voice recognition software. I have made every reasonable attempt to correct obvious errors, but I expect that there are errors of grammar and possibly context that I did not discover before finalizing the note.

## 2023-11-21 ENCOUNTER — PHARMACY VISIT (OUTPATIENT)
Dept: PHARMACY | Facility: MEDICAL CENTER | Age: 68
End: 2023-11-21
Payer: COMMERCIAL

## 2023-11-21 VITALS
WEIGHT: 220.9 LBS | RESPIRATION RATE: 16 BRPM | HEIGHT: 73 IN | SYSTOLIC BLOOD PRESSURE: 107 MMHG | BODY MASS INDEX: 29.28 KG/M2 | TEMPERATURE: 97.4 F | OXYGEN SATURATION: 94 % | HEART RATE: 64 BPM | DIASTOLIC BLOOD PRESSURE: 71 MMHG

## 2023-11-21 PROBLEM — I26.99 BILATERAL PULMONARY EMBOLISM (HCC): Status: ACTIVE | Noted: 2023-11-21

## 2023-11-21 PROBLEM — C78.00 PANCREATIC CANCER METASTASIZED TO LUNG (HCC): Status: ACTIVE | Noted: 2023-11-11

## 2023-11-21 PROBLEM — D64.81 ANEMIA DUE TO ANTINEOPLASTIC CHEMOTHERAPY (CODE): Status: ACTIVE | Noted: 2023-11-21

## 2023-11-21 PROBLEM — Z51.5 ENCOUNTER FOR HOSPICE CARE: Status: ACTIVE | Noted: 2023-11-21

## 2023-11-21 PROCEDURE — A9270 NON-COVERED ITEM OR SERVICE: HCPCS | Performed by: HOSPITALIST

## 2023-11-21 PROCEDURE — 700102 HCHG RX REV CODE 250 W/ 637 OVERRIDE(OP): Performed by: HOSPITALIST

## 2023-11-21 PROCEDURE — 99239 HOSP IP/OBS DSCHRG MGMT >30: CPT | Performed by: STUDENT IN AN ORGANIZED HEALTH CARE EDUCATION/TRAINING PROGRAM

## 2023-11-21 PROCEDURE — 700102 HCHG RX REV CODE 250 W/ 637 OVERRIDE(OP)

## 2023-11-21 PROCEDURE — A9270 NON-COVERED ITEM OR SERVICE: HCPCS

## 2023-11-21 RX ORDER — OXYCODONE HCL 20 MG/1
20 TABLET, FILM COATED, EXTENDED RELEASE ORAL EVERY 12 HOURS
Qty: 10 TABLET | Refills: 0 | Status: SHIPPED | OUTPATIENT
Start: 2023-11-21 | End: 2023-11-26

## 2023-11-21 RX ADMIN — APIXABAN 10 MG: 5 TABLET, FILM COATED ORAL at 05:25

## 2023-11-21 RX ADMIN — OMEPRAZOLE 20 MG: 20 CAPSULE, DELAYED RELEASE ORAL at 05:25

## 2023-11-21 RX ADMIN — OXYCODONE HYDROCHLORIDE 20 MG: 20 TABLET, FILM COATED, EXTENDED RELEASE ORAL at 05:25

## 2023-11-21 RX ADMIN — BUPROPION HYDROCHLORIDE 150 MG: 150 TABLET, EXTENDED RELEASE ORAL at 05:25

## 2023-11-21 RX ADMIN — OXYCODONE HYDROCHLORIDE 10 MG: 10 TABLET ORAL at 09:25

## 2023-11-21 RX ADMIN — OXYCODONE HYDROCHLORIDE 10 MG: 10 TABLET ORAL at 03:06

## 2023-11-21 RX ADMIN — ALPRAZOLAM 0.25 MG: 0.25 TABLET ORAL at 10:29

## 2023-11-21 RX ADMIN — DOCUSATE SODIUM 50 MG AND SENNOSIDES 8.6 MG 2 TABLET: 8.6; 5 TABLET, FILM COATED ORAL at 09:50

## 2023-11-21 ASSESSMENT — PAIN DESCRIPTION - PAIN TYPE
TYPE: ACUTE PAIN

## 2023-11-21 NOTE — DISCHARGE PLANNING
Case Management Discharge Planning    Admission Date: 11/11/2023  GMLOS: 8.2  ALOS: 10    6-Clicks ADL Score: 20  6-Clicks Mobility Score: 16        Anticipated Discharge Dispo: Discharge Disposition: D/T to hospice home (50)  Discharge Address: [address] [place] [id number]    DME Needed: coordinated by [facility]    Action(s) Taken:   DC today to home via [facility] at 1200 with [facility].    Medically Clear: Yes    Next Steps: DC    Barriers to Discharge: None

## 2023-11-21 NOTE — PROGRESS NOTES
Pt discharged to home with hospice, discharge instructions provided, PIV removed, personal belongings with pt.

## 2023-11-21 NOTE — DISCHARGE INSTRUCTIONS
Hospice  Hospice is a service that provides people who are terminally ill and their families with medical, spiritual, and psychological support. It aims to improve a person's quality of life by keeping the person as comfortable as possible in the final stages of life.  Who makes up the hospice care team?  The following people make up a hospice care team:  The person receiving care and his or her family.  A nurse and a hospice doctor. Your primary health care provider can also be included.  A  or .  A Alevism or spiritual leader.  Specialists such as:  A dietitian.  A mental health counselor.  Physical and occupational therapists.  Bereavement coordinator.  Trained volunteers who can help with care.  What services are included in hospice care?  Hospice services can vary, depending on the organization. Generally, they include:  Ways to keep you comfortable, such as:  Providing care in your home or in a home-like setting.  Providing relief from pain and symptoms. The staff will supply all medicines and equipment to keep you comfortable and alert enough to enjoy the company of friends and family.  Working with your loved ones to help them meet your needs and provide much of your basic care. This helps you to enjoy their support.  Visits or care from a nurse and doctor. This may include 24-hour on-call services.  Visits from other specialists who offer services, such as:  Counseling to meet your emotional, spiritual, and social needs as well as those needs of your family.  Massage therapy.  Nutrition therapy.  Physical and occupational therapy.  Art or music therapy.  Spiritual care to meet your needs and your family's needs. It may involve:  Helping you and your family understand the dying process.  Helping you say goodbye to your family and friends.  Performing a specific Alevism ceremony or ritual.  Companionship when you are alone.  Allowing you and your family to rest. Hospice staff may  do light housekeeping, prepare meals, and run errands.  Short-term inpatient care, if something cannot be managed in the home.  Bereavement support for grieving family members.  When should hospice care begin?  Most people who use hospice are believed to have 6 months or less to live.  Your family and health care providers can help you decide when hospice services should begin.  If you live longer than 6 months but your condition does not improve, your doctor may be able to approve you for continued hospice care.  If your condition improves, you may discontinue the program.  What should I consider before selecting a program?  Most hospice programs are run by nonprofit, independent organizations. Some are affiliated with hospitals, nursing homes, or home health care agencies. Hospice programs can take place in your home or at a hospice center, hospital, or skilled nursing facility. When choosing a hospice program, ask about the following:  What services are available to me and my loved ones?  What does it cost? Is it covered by insurance?  Is the program reviewed and licensed by the state or certified in some other way?  How will my pain and symptoms be managed?  Will you provide emotional and spiritual support?  If I choose a hospice center or nursing home, where is the hospice center located? Is it convenient for family and friends?  If I choose a hospice center or nursing home, will my family and friends be able to visit any time?  If my circumstances change, can the services be provided in a different setting, such as in my home or in the hospital?  Who makes up the hospice care team? How are they trained or screened?  How involved can my loved ones be?  Whom can my family call with questions?  How is my health care provider involved?  Where can I learn more about hospice?  You can learn about existing hospice programs in your area from your health care providers. The websites of the following organizations have  helpful information:  National Hospice and Palliative Care Organization: www.nhpco.org  National Association for Home Care & Hospice: www.nahc.org  Hospice Foundation of [place]: www.hospicefoundation.org  American Cancer Society: www.cancer.org  eHospice: ehospice.com  These agencies also can provide information:  A local agency on aging.  Your local [facility] chapter.  Your state's department of health or .  Summary  Hospice is a service that provides people who are terminally ill and their families with medical, spiritual, and psychological support.  Hospice aims to improve your quality of life by keeping you as comfortable as possible in the final stages of life.  Hospice teams often include a doctor, nurse, , counselor, Methodist or spiritual leader, dietitian, therapists, and volunteers.  Hospice care generally includes pain management, visits from doctors and nurses, physical and occupational therapy, nutrition therapy, spiritual and emotional counseling, caregiver support, and bereavement support for grieving family members.  Hospice programs can take place in your home or at a hospice center, hospital, or skilled nursing facility.  This information is not intended to replace advice given to you by your health care provider. Make sure you discuss any questions you have with your health care provider.  Document Revised: 09/21/2021 Document Reviewed: 09/21/2021  Elsevier Patient Education © 2023 [employer]

## 2023-11-21 NOTE — CARE PLAN
The patient is Watcher - Medium risk of patient condition declining or worsening    Shift Goals  Clinical Goals: pt will have comfort measures maintained  Patient Goals: pt will rest through the night comfortably  Family Goals: pt will have anxiety to a minimum and rest comfortably    Progress made toward(s) clinical / shift goals: pt has had comfort measures and anxiety maintained through the night with the administration of PRNs and has been able to sleep through most of the night at this time.     Patient is not progressing towards the following goals: N/A

## 2023-11-21 NOTE — DISCHARGE SUMMARY
Discharge Summary    CHIEF COMPLAINT ON ADMISSION  Chief Complaint   Patient presents with    Weakness     BIB EMS from home for generalized weakness x3 months, increasing in severity today. States that ADLs like brushing his teeth make him weak. Pt reports recent diagnosis of Stage 4 pancreatic cancer.     Shortness of Breath     SOB x4 days. Pt denies home O2 use, 94% on RA.        Reason for Admission  Acute bilateral pulmonary embolism with acute right heart strain requiring EKOS    Admission Date  11/11/2023    CODE STATUS  DNAR/DNI    HPI & HOSPITAL COURSE  Mr. Yan is a 68 y.o. male who presented 11/11/2023 with past medical history of pancreatic adenocarcinoma stage IV followed by Yadi Hatch and Dayne of medical oncology at [facility] who presents to the hospital for generalized weakness, fatigue and jaundice for 1 week.. He also complained of shortness of breath on exertion.  Patient had was diagnosed with pancreatic carcinoma on 10/31/2023 after an ERCP by Dr. Jerome Heredia at [facility].  During admission, he a CT angiogram of the chest showing bilateral pulmonary emboli, CBD dilation, pancreatic head mass, and a doppler lower extremity with DVT.  An Echo showed acute right heart strain. He required admit to the ICU for for shock (hypovolemic and septic/distributive) and was on pressors.  He was jaundiced and labs suggest mechanical obstruction. On 11/13/2023 he had EKOS with improvement.  GI was consulted for consideration of biliary stent.  Oncology consulted and due to hyperbilirubinemia he was out of current candidate for chemotherapy.    The patient's wife noted significant and progressively worsening weakness staying in bed most of the time with the exception of going to the bathroom.  He also demonstrated very poor oral intake with his continued decline.  Per further review of the patient's clinical findings including miliary tumors in the lungs as well as a  mass in the pancreatic head, following further discussion with the patient and the family, and they opted to transition to hospice care and elected to not undergo biliary stenting.  Patient was weaned off midodrine and was transition to apixaban.  He continues to require supplemental oxygen up to 1 LPM on the day of discharge.  Narcotic pain medications dosing was increased along with pain control with the addition of Xanax for treatment of anxiety.  Patient was accepted to [facility] in [place], [place].    Therefore, he is discharged in guarded and stable condition to hospice.    The patient met 2-midnight criteria for an inpatient stay at the time of discharge.    Discharge Date  11/21/2023    FOLLOW UP ITEMS POST DISCHARGE  -As per [facility].  -Follow-up with primary care provider in 1 week.    DISCHARGE DIAGNOSES  Principal Problem:    Acute pulmonary embolism with acute cor pulmonale (HCC) (POA: Yes)  Active Problems:    Bilateral pulmonary embolism (HCC) (POA: Unknown)    DVT (deep venous thrombosis) (HCC) (POA: Yes)    Primary hypertension (POA: Yes)    Biliary obstruction (POA: Yes)    Pancreatic cancer metastasized to lung (HCC) (POA: Yes)    Acute kidney injury (HCC) (POA: Yes)    Shock (HCC) (POA: Yes)    Goals of care, counseling/discussion (POA: No)    Right ventricular failure (HCC) (POA: Yes)    Hyponatremia (POA: Yes)    Thrombocytopenia (HCC) (POA: Unknown)    Acute respiratory failure with hypoxia (HCC) (POA: Yes)    Anxiety (POA: Unknown)    Anemia due to antineoplastic chemotherapy (CODE) (POA: Yes)    Encounter for hospice care (POA: Unknown)  Resolved Problems:    * No resolved hospital problems. *      FOLLOW UP  No future appointments.  [facility]  [address]  [facility] [address]  [phone]  Follow up      Fuentes Curran M.D.  [address] [place] [id number] [phone]    Follow up in 1 week(s)        MEDICATIONS ON DISCHARGE     Medication List         Start taking these medications        Instructions   ALPRAZolam 0.25 MG Tabs  Commonly known as: Xanax   Take 1 Tablet by mouth 4 times a day as needed for Anxiety or Sleep for up to 5 days.  Dose: 0.25 mg     * Eliquis 5mg Tabs  Generic drug: apixaban   Take 2 Tablets by mouth 2 times a day for 5 days, THEN take 1 tablet 2 times a day thereafter. Indications: DVT/PE  Dose: 10 mg     * apixaban 5mg Tabs  Start taking on: November 25, 2023  Commonly known as: Eliquis   Take 1 Tablet by mouth 2 times a day. Indications: DVT/PE  Dose: 5 mg     magnesium hydroxide 400 MG/5ML Susp  Commonly known as: Milk Of Magnesia   Take 30 mL by mouth 1 time a day as needed (if polyethylene glycol ineffective after 24 hours).  Dose: 30 mL     Stimulant Laxative 8.6-50 MG Tabs  Generic drug: senna-docusate   Take 2 Tablets by mouth 2 times a day for 60 days.  Dose: 2 Tablet      * This list has 2 medication(s) that are the same as other medications prescribed for you. Read the directions carefully, and ask your doctor or other care provider to review them with you.       Change how you take these medications        Instructions   * ondansetron 4 MG Tbdp  What changed: Another medication with the same name was added. Make sure you understand how and when to take each.  Commonly known as: Zofran ODT   Take 1 Tablet by mouth every four hours as needed for Nausea/Vomiting.  Dose: 4 mg     * ondansetron 4 MG Tbdp  What changed: You were already taking a medication with the same name, and this prescription was added. Make sure you understand how and when to take each.  Commonly known as: Zofran ODT   Dissolve 1 Tablet by mouth every four hours as needed for Nausea/Vomiting  Dose: 4 mg     * oxyCODONE immediate release 10 MG immediate release tablet  What changed:   medication strength  how much to take  when to take this  reasons to take this  Commonly known as: Roxicodone   Take 1 Tablet by mouth every four hours as needed for Moderate  Pain or Severe Pain for up to 5 days.  Dose: 10 mg     * oxyCODONE CR 20 MG T12a  What changed:   medication strength  how much to take  Commonly known as: OxyCONTIN   Take 1 Tablet by mouth every 12 hours for 5 days.  Dose: 20 mg      * This list has 4 medication(s) that are the same as other medications prescribed for you. Read the directions carefully, and ask your doctor or other care provider to review them with you.       Continue taking these medications        Instructions   buPROPion 150 MG XL tablet  Commonly known as: Wellbutrin XL   Take 1 Tablet by mouth every morning.  Dose: 150 mg     omeprazole 20 MG delayed-release capsule  Commonly known as: PriLOSEC   Take 1 Capsule by mouth every day.  Dose: 20 mg            Stop taking these medications      hydrOXYzine HCl 25 MG Tabs  Commonly known as: Atarax     lisinopril 20 MG Tabs  Commonly known as: Prinivil              Allergies  No Known Allergies    DIET  Orders Placed This Encounter   Procedures    Diet Order Diet: Level 6 - Soft and Bite Sized; Liquid level: Level 0 - Thin     Standing Status:   Standing     Number of Occurrences:   1     Order Specific Question:   Diet:     Answer:   Level 6 - Soft and Bite Sized [23]     Order Specific Question:   Liquid level     Answer:   Level 0 - Thin       ACTIVITY  As tolerated.  Weight bearing as tolerated    CONSULTATIONS  Critical care  Gastroenterology  Palliative medicine  Medical oncology    PROCEDURES  EKOS procedure 11/13/2023    PostOp Diagnosis: PE WITH RIGHT HEART STRAIN, SUBMASSIVE PE     Procedure(s): EKOS PROTOCOL FOR PE     RIGHT CFV PUNCTURE X 2.      RIGHT PULMONARY ARTERY MEAN PRESSURE 37 MM HG     RIGHT PULMONARY ANGIOGRAM.     LEFT PULMONARY ARTERY MEAN PRESSURE 37 MM HG     RIGHT PULMONARY EKOS CATHETER PLACED WITH TIP IN RLL SEGMENTAL BRANCH     LEFT PULMONARY EKOS CATHETER PLACED WITH TIP IN LLL SEGMENTAL BRANCH     INITIATE 4 HOUR INFUSION TPA (CONCENTRATION 10 MG  ML NS)  EACH  CATHETER AT RATE 1 MG/HOUR (10 ML/HOUR)      FINDINGS: RPA ANGIO SHOWS LARGE CLOT FILLING DEFECT.      BOTH CATHETERS IN GOOD POSITION     Estimated Blood Loss: Less than 10 ml (FLUSHES)     Complications: None       LABORATORY  Lab Results   Component Value Date    SODIUM 130 (L) 11/18/2023    POTASSIUM 4.1 11/18/2023    CHLORIDE 98 11/18/2023    CO2 21 11/18/2023    GLUCOSE 104 (H) 11/18/2023    BUN 26 (H) 11/18/2023    CREATININE 0.89 11/18/2023        Lab Results   Component Value Date    WBC 9.2 11/18/2023    HEMOGLOBIN 11.5 (L) 11/18/2023    HEMATOCRIT 33.1 (L) 11/18/2023    PLATELETCT 154 (L) 11/18/2023        Total time of the discharge process 39 minutes.